# Patient Record
Sex: FEMALE | Race: WHITE | NOT HISPANIC OR LATINO | URBAN - METROPOLITAN AREA
[De-identification: names, ages, dates, MRNs, and addresses within clinical notes are randomized per-mention and may not be internally consistent; named-entity substitution may affect disease eponyms.]

---

## 2017-09-30 VITALS
OXYGEN SATURATION: 98 % | WEIGHT: 158.07 LBS | TEMPERATURE: 98 F | SYSTOLIC BLOOD PRESSURE: 103 MMHG | RESPIRATION RATE: 16 BRPM | HEART RATE: 71 BPM | DIASTOLIC BLOOD PRESSURE: 68 MMHG

## 2017-09-30 PROCEDURE — 99291 CRITICAL CARE FIRST HOUR: CPT | Mod: 25

## 2017-09-30 PROCEDURE — 93010 ELECTROCARDIOGRAM REPORT: CPT

## 2017-09-30 PROCEDURE — 99282 EMERGENCY DEPT VISIT SF MDM: CPT

## 2017-09-30 PROCEDURE — 70450 CT HEAD/BRAIN W/O DYE: CPT | Mod: 26

## 2017-09-30 RX ORDER — SODIUM CHLORIDE 9 MG/ML
1000 INJECTION INTRAMUSCULAR; INTRAVENOUS; SUBCUTANEOUS
Qty: 0 | Refills: 0 | Status: DISCONTINUED | OUTPATIENT
Start: 2017-09-30 | End: 2017-10-01

## 2017-09-30 RX ADMIN — SODIUM CHLORIDE 125 MILLILITER(S): 9 INJECTION INTRAMUSCULAR; INTRAVENOUS; SUBCUTANEOUS at 23:41

## 2017-09-30 NOTE — CONSULT NOTE ADULT - SUBJECTIVE AND OBJECTIVE BOX
Pt is 71yo female, on Xarelto for A-fib, s/p mechanical fall today, + head trauma, no LOC, HCT: possible L temporal horn very small IVH, pt at this time denies sob, cp, acute numbness/weakness, acute visual changes, n/v, neck pain.    ICU Vital Signs Last 24 Hrs  T(C): 36.6 (30 Sep 2017 20:32), Max: 36.6 (30 Sep 2017 20:32)  T(F): 97.9 (30 Sep 2017 20:32), Max: 97.9 (30 Sep 2017 20:32)  HR: 71 (30 Sep 2017 20:32) (71 - 71)  BP: 103/68 (30 Sep 2017 20:32) (103/68 - 103/68)  BP(mean): --  ABP: --  ABP(mean): --  RR: 16 (30 Sep 2017 20:32) (16 - 16)  SpO2: 98% (30 Sep 2017 20:32) (98% - 98%)      < from: CT Head No Cont (09.30.17 @ 21:19) >  Linear hyperdense focus within the left temporal horn, possibly   representing intraventricular hemorrhage versus choroid calcifications.     < end of copied text >      Exam:   AA&Ox3, NAD, clear coherent speech,  CNs II-XII grossly intact,  motor 5/5 x 4 extr, no drift, no dysmetria,  sensation to LT grossly intact in all dermatomes,  Head: occipital small abrasion, no drainage,    Recommendations:  No Neurosurgical intervention is indicated at this time  Hold Xarelto x 7 days  Keppra 500q12 x 7 days   Neurology consult and HCT repeat in AM tomorrow or earlier if any acute Neurological changes are noted  Call with any questions or concerns [phone]  D/w Dr. Elizabeth

## 2017-09-30 NOTE — ED ADULT TRIAGE NOTE - CHIEF COMPLAINT QUOTE
missed 1 step falling down stairs, hitting back of head on the floor; with cut at the back of head, + pain to head; denies LoC, dizziness or nausea; currently on Xarelto

## 2017-10-01 ENCOUNTER — INPATIENT (INPATIENT)
Facility: HOSPITAL | Age: 72
LOS: 0 days | Discharge: HOME | DRG: 605 | End: 2017-10-01
Attending: PSYCHIATRY & NEUROLOGY | Admitting: PSYCHIATRY & NEUROLOGY
Payer: COMMERCIAL

## 2017-10-01 ENCOUNTER — TRANSCRIPTION ENCOUNTER (OUTPATIENT)
Age: 72
End: 2017-10-01

## 2017-10-01 VITALS — TEMPERATURE: 98 F

## 2017-10-01 DIAGNOSIS — I48.91 UNSPECIFIED ATRIAL FIBRILLATION: ICD-10-CM

## 2017-10-01 DIAGNOSIS — I10 ESSENTIAL (PRIMARY) HYPERTENSION: ICD-10-CM

## 2017-10-01 DIAGNOSIS — Z29.9 ENCOUNTER FOR PROPHYLACTIC MEASURES, UNSPECIFIED: ICD-10-CM

## 2017-10-01 DIAGNOSIS — Z90.710 ACQUIRED ABSENCE OF BOTH CERVIX AND UTERUS: Chronic | ICD-10-CM

## 2017-10-01 DIAGNOSIS — R63.8 OTHER SYMPTOMS AND SIGNS CONCERNING FOOD AND FLUID INTAKE: ICD-10-CM

## 2017-10-01 DIAGNOSIS — Z98.890 OTHER SPECIFIED POSTPROCEDURAL STATES: Chronic | ICD-10-CM

## 2017-10-01 DIAGNOSIS — K22.70 BARRETT'S ESOPHAGUS WITHOUT DYSPLASIA: ICD-10-CM

## 2017-10-01 DIAGNOSIS — E78.5 HYPERLIPIDEMIA, UNSPECIFIED: ICD-10-CM

## 2017-10-01 DIAGNOSIS — N17.9 ACUTE KIDNEY FAILURE, UNSPECIFIED: ICD-10-CM

## 2017-10-01 DIAGNOSIS — I62.9 NONTRAUMATIC INTRACRANIAL HEMORRHAGE, UNSPECIFIED: ICD-10-CM

## 2017-10-01 DIAGNOSIS — Z90.49 ACQUIRED ABSENCE OF OTHER SPECIFIED PARTS OF DIGESTIVE TRACT: Chronic | ICD-10-CM

## 2017-10-01 DIAGNOSIS — K75.81 NONALCOHOLIC STEATOHEPATITIS (NASH): ICD-10-CM

## 2017-10-01 LAB
ALBUMIN SERPL ELPH-MCNC: 4.2 G/DL — SIGNIFICANT CHANGE UP (ref 3.3–5)
ALP SERPL-CCNC: 63 U/L — SIGNIFICANT CHANGE UP (ref 40–120)
ALT FLD-CCNC: 32 U/L — SIGNIFICANT CHANGE UP (ref 10–45)
ANION GAP SERPL CALC-SCNC: 12 MMOL/L — SIGNIFICANT CHANGE UP (ref 5–17)
ANION GAP SERPL CALC-SCNC: 18 MMOL/L — HIGH (ref 5–17)
APTT BLD: 26 SEC — LOW (ref 27.5–37.4)
AST SERPL-CCNC: 51 U/L — HIGH (ref 10–40)
BASOPHILS NFR BLD AUTO: 0.5 % — SIGNIFICANT CHANGE UP (ref 0–2)
BILIRUB SERPL-MCNC: 0.4 MG/DL — SIGNIFICANT CHANGE UP (ref 0.2–1.2)
BUN SERPL-MCNC: 40 MG/DL — HIGH (ref 7–23)
BUN SERPL-MCNC: 44 MG/DL — HIGH (ref 7–23)
CALCIUM SERPL-MCNC: 8.6 MG/DL — SIGNIFICANT CHANGE UP (ref 8.4–10.5)
CALCIUM SERPL-MCNC: 9.5 MG/DL — SIGNIFICANT CHANGE UP (ref 8.4–10.5)
CHLORIDE SERPL-SCNC: 97 MMOL/L — SIGNIFICANT CHANGE UP (ref 96–108)
CHLORIDE SERPL-SCNC: 99 MMOL/L — SIGNIFICANT CHANGE UP (ref 96–108)
CO2 SERPL-SCNC: 19 MMOL/L — LOW (ref 22–31)
CO2 SERPL-SCNC: 24 MMOL/L — SIGNIFICANT CHANGE UP (ref 22–31)
CREAT SERPL-MCNC: 1.46 MG/DL — HIGH (ref 0.5–1.3)
CREAT SERPL-MCNC: 1.55 MG/DL — HIGH (ref 0.5–1.3)
EOSINOPHIL NFR BLD AUTO: 0.7 % — SIGNIFICANT CHANGE UP (ref 0–6)
GLUCOSE SERPL-MCNC: 155 MG/DL — HIGH (ref 70–99)
GLUCOSE SERPL-MCNC: 178 MG/DL — HIGH (ref 70–99)
HCT VFR BLD CALC: 42.9 % — SIGNIFICANT CHANGE UP (ref 34.5–45)
HGB BLD-MCNC: 14.8 G/DL — SIGNIFICANT CHANGE UP (ref 11.5–15.5)
INR BLD: 1.26 — HIGH (ref 0.88–1.16)
LYMPHOCYTES # BLD AUTO: 23.2 % — SIGNIFICANT CHANGE UP (ref 13–44)
MCHC RBC-ENTMCNC: 32 PG — SIGNIFICANT CHANGE UP (ref 27–34)
MCHC RBC-ENTMCNC: 34.5 G/DL — SIGNIFICANT CHANGE UP (ref 32–36)
MCV RBC AUTO: 92.7 FL — SIGNIFICANT CHANGE UP (ref 80–100)
MONOCYTES NFR BLD AUTO: 6.5 % — SIGNIFICANT CHANGE UP (ref 2–14)
NEUTROPHILS NFR BLD AUTO: 69.1 % — SIGNIFICANT CHANGE UP (ref 43–77)
PLATELET # BLD AUTO: 193 K/UL — SIGNIFICANT CHANGE UP (ref 150–400)
POTASSIUM SERPL-MCNC: 4 MMOL/L — SIGNIFICANT CHANGE UP (ref 3.5–5.3)
POTASSIUM SERPL-MCNC: 4.4 MMOL/L — SIGNIFICANT CHANGE UP (ref 3.5–5.3)
POTASSIUM SERPL-MCNC: SIGNIFICANT CHANGE UP MMOL/L (ref 3.5–5.3)
POTASSIUM SERPL-SCNC: 4 MMOL/L — SIGNIFICANT CHANGE UP (ref 3.5–5.3)
POTASSIUM SERPL-SCNC: 4.4 MMOL/L — SIGNIFICANT CHANGE UP (ref 3.5–5.3)
POTASSIUM SERPL-SCNC: SIGNIFICANT CHANGE UP MMOL/L (ref 3.5–5.3)
PROT SERPL-MCNC: 8.1 G/DL — SIGNIFICANT CHANGE UP (ref 6–8.3)
PROTHROM AB SERPL-ACNC: 14.1 SEC — HIGH (ref 9.8–12.7)
RBC # BLD: 4.63 M/UL — SIGNIFICANT CHANGE UP (ref 3.8–5.2)
RBC # FLD: 15.8 % — SIGNIFICANT CHANGE UP (ref 10.3–16.9)
SODIUM SERPL-SCNC: 134 MMOL/L — LOW (ref 135–145)
SODIUM SERPL-SCNC: 135 MMOL/L — SIGNIFICANT CHANGE UP (ref 135–145)
WBC # BLD: 14.8 K/UL — HIGH (ref 3.8–10.5)
WBC # FLD AUTO: 14.8 K/UL — HIGH (ref 3.8–10.5)

## 2017-10-01 PROCEDURE — 80053 COMPREHEN METABOLIC PANEL: CPT

## 2017-10-01 PROCEDURE — 85025 COMPLETE CBC W/AUTO DIFF WBC: CPT

## 2017-10-01 PROCEDURE — 80048 BASIC METABOLIC PNL TOTAL CA: CPT

## 2017-10-01 PROCEDURE — 85730 THROMBOPLASTIN TIME PARTIAL: CPT

## 2017-10-01 PROCEDURE — 99285 EMERGENCY DEPT VISIT HI MDM: CPT | Mod: 25

## 2017-10-01 PROCEDURE — 70450 CT HEAD/BRAIN W/O DYE: CPT | Mod: 26

## 2017-10-01 PROCEDURE — 70450 CT HEAD/BRAIN W/O DYE: CPT

## 2017-10-01 PROCEDURE — 99222 1ST HOSP IP/OBS MODERATE 55: CPT

## 2017-10-01 PROCEDURE — 36415 COLL VENOUS BLD VENIPUNCTURE: CPT

## 2017-10-01 PROCEDURE — 85610 PROTHROMBIN TIME: CPT

## 2017-10-01 PROCEDURE — 84132 ASSAY OF SERUM POTASSIUM: CPT

## 2017-10-01 PROCEDURE — 93005 ELECTROCARDIOGRAM TRACING: CPT

## 2017-10-01 RX ORDER — ACETAMINOPHEN 500 MG
650 TABLET ORAL EVERY 6 HOURS
Qty: 0 | Refills: 0 | Status: DISCONTINUED | OUTPATIENT
Start: 2017-10-01 | End: 2017-10-01

## 2017-10-01 RX ORDER — RIVAROXABAN 15 MG-20MG
15 KIT ORAL AT BEDTIME
Qty: 0 | Refills: 0 | Status: DISCONTINUED | OUTPATIENT
Start: 2017-10-01 | End: 2017-10-01

## 2017-10-01 RX ORDER — CHOLECALCIFEROL (VITAMIN D3) 125 MCG
2000 CAPSULE ORAL DAILY
Qty: 0 | Refills: 0 | Status: DISCONTINUED | OUTPATIENT
Start: 2017-10-01 | End: 2017-10-01

## 2017-10-01 RX ORDER — ASPIRIN/CALCIUM CARB/MAGNESIUM 324 MG
81 TABLET ORAL DAILY
Qty: 0 | Refills: 0 | Status: DISCONTINUED | OUTPATIENT
Start: 2017-10-01 | End: 2017-10-01

## 2017-10-01 RX ORDER — ATORVASTATIN CALCIUM 80 MG/1
40 TABLET, FILM COATED ORAL AT BEDTIME
Qty: 0 | Refills: 0 | Status: DISCONTINUED | OUTPATIENT
Start: 2017-10-01 | End: 2017-10-01

## 2017-10-01 RX ORDER — PREGABALIN 225 MG/1
1000 CAPSULE ORAL DAILY
Qty: 0 | Refills: 0 | Status: DISCONTINUED | OUTPATIENT
Start: 2017-10-01 | End: 2017-10-01

## 2017-10-01 RX ORDER — INFLUENZA VIRUS VACCINE 15; 15; 15; 15 UG/.5ML; UG/.5ML; UG/.5ML; UG/.5ML
0.5 SUSPENSION INTRAMUSCULAR ONCE
Qty: 0 | Refills: 0 | Status: COMPLETED | OUTPATIENT
Start: 2017-10-01 | End: 2017-10-01

## 2017-10-01 RX ORDER — ACETAMINOPHEN 500 MG
650 TABLET ORAL ONCE
Qty: 0 | Refills: 0 | Status: COMPLETED | OUTPATIENT
Start: 2017-10-01 | End: 2017-10-01

## 2017-10-01 RX ORDER — PANTOPRAZOLE SODIUM 20 MG/1
40 TABLET, DELAYED RELEASE ORAL
Qty: 0 | Refills: 0 | Status: DISCONTINUED | OUTPATIENT
Start: 2017-10-01 | End: 2017-10-01

## 2017-10-01 RX ADMIN — Medication 650 MILLIGRAM(S): at 01:50

## 2017-10-01 RX ADMIN — PANTOPRAZOLE SODIUM 40 MILLIGRAM(S): 20 TABLET, DELAYED RELEASE ORAL at 06:53

## 2017-10-01 RX ADMIN — Medication 650 MILLIGRAM(S): at 13:08

## 2017-10-01 RX ADMIN — Medication 650 MILLIGRAM(S): at 02:38

## 2017-10-01 NOTE — H&P ADULT - ATTENDING COMMENTS
possible hemorrhage vs. choroid calcification in left temporal horn after a fall while at a wedding. She had had a drink but states she was not drunk and just didn't see the stairs. She hit the back of her head and fell onto her lower back. Repeat CT head more consistent with choroid calcification. Neurological exam shows some sensory loss in the legs worse on the left, with some hyperreflexia in the left arm; she could have cervical spine disease +/- lumbar spine disease vs. polyneuropathy. Creatinine is elevated but could be chronic. All of these can be worked up further as an outpatient. Discharge home with follow up with PMD and with me for further workup and treatment possible hemorrhage vs. choroid calcification in left temporal horn after a fall while at a wedding. She had had a drink but states she was not drunk and just didn't see the stairs. She hit the back of her head and fell onto her lower back. Repeat CT head more consistent with choroid calcification. Neurological exam shows some sensory loss in the legs worse on the left, with some hyperreflexia in the left arm; she could have cervical spine disease +/- lumbar spine disease vs. polyneuropathy. Creatinine is elevated but could be chronic. All of these can be worked up further as an outpatient. Discharge home with follow up with PMD and with me for further workup and treatment. Can restart xarelto and no need for antiepileptic as there is no hemorrhage.

## 2017-10-01 NOTE — DISCHARGE NOTE ADULT - CARE PLAN
Principal Discharge DX:	Intracranial bleed  Goal:	r/o intracranial bleed  Instructions for follow-up, activity and diet:	You had a fall down a few steps, without loss of consciousness. You had a head CT which showed possible bleed, however repeat imaging was negative for any bleed.   Please continue your Xarelto as prescribed.    Return to ED or call your PMD if you experience any blurry vision, intractable headaches, dizziness, or weakness of your extremities.  Secondary Diagnosis:	Fall down steps  Instructions for follow-up, activity and diet:	Plan as above.  Secondary Diagnosis:	Peripheral neuropathy  Instructions for follow-up, activity and diet:	Please followup with Dr. Arreguin in 1-2 weeks.  Secondary Diagnosis:	Atrial fibrillation  Instructions for follow-up, activity and diet:	Continue home Xarelto.  Secondary Diagnosis:	TED (acute kidney injury)  Instructions for follow-up, activity and diet:	Please followup with your PMD in 1 week for repeat labs to check your kidney function.

## 2017-10-01 NOTE — PROGRESS NOTE ADULT - SUBJECTIVE AND OBJECTIVE BOX
NEUROSURGERY CONSULT NOTE:    Repeat CTH performed this am indicates no acute intracranial hemorrhage. In light of the CTH finding, Xaralto can be restarted and Keppra can be discontinued at this time.      PROCEDURE: CT head without intravenous contrast    CLINICAL INDICATION: Mechanical fall. Possible small intracranial   hemorrhage on prior CT scan.    COMPARISON: CT brain 9/30/2017.    FINDINGS:     The ventricles, cisternal spaces, and cortical sulci are normal in size   and configuration.    There is no acute intracranial hemorrhage. There is no mass effect,   midline shift or extra axial collection.     The gray white differentiation appears preserved without evidence of an   acute transcortical infarction.     The bony windows demonstrates no fractures. The visualized paranasal   sinuses and mastoid air cells are predominantly clear.    IMPRESSION:     No evidence for acute intracranial hemorrhage.            HPI:  71 yo F PMH of Afib (recently diagnosed and on Xarelto), HARRIS, neuropathy HLD, HTN presenting after tripping on stairs, hitting the posterior left of her occiput on the floor.  She was in her usual state of health prior to the event.  She denies any loss of consciousness, chest pain, palpitations, SOB, tunnel vision or light-headedness prior to or after the event.    In Ed: Vitals: T: 97.9, HR: 71, BP: 103/68, RR: 16, 98% on RA.  Labs significant for TED with creatinine of 1.46, BUN of 40, and mild elevation of AST to 51.  Head CT showed Linear hyperdense focus within the left temporal horn, possibly representing intraventricular hemorrhage versus choroid calcifications. Short-term follow-up head CT is recommended."  Patient evaluated by neurosurgery and deemed stable for medical management at this time. Patient received 1L NS and was admitted to  for further monitoring. (01 Oct 2017 02:23)    OVERNIGHT EVENTS:  Vital Signs Last 24 Hrs  T(C): 36.7 (01 Oct 2017 12:37), Max: 36.7 (01 Oct 2017 02:56)  T(F): 98 (01 Oct 2017 12:37), Max: 98.1 (01 Oct 2017 02:56)  HR: 75 (01 Oct 2017 12:37) (71 - 81)  BP: 131/58 (01 Oct 2017 12:37) (103/68 - 143/51)  BP(mean): 74 (01 Oct 2017 06:50) (74 - 90)  RR: 17 (01 Oct 2017 12:37) (16 - 18)  SpO2: 100% (01 Oct 2017 12:37) (97% - 100%)    I&O's Summary      PHYSICAL EXAM:  Neurological:  AAOx3, NAD, coherent speech, FC  CNII-XII grossly intact, PERRL, EOMI, face symmetric  MAEx4, strength 5/5 UE and LE b/l, no drift  SILT throughout b/l        TUBES/LINES:  [] Friedman  [] Lumbar Drain  [] Wound Drains  [] Others      DIET:  [] NPO  [] Mechanical  [] Tube feeds    LABS:                        14.8   14.8  )-----------( 193      ( 30 Sep 2017 23:31 )             42.9     10-01    135  |  99  |  44<H>  ----------------------------<  178<H>  4.4   |  24  |  1.55<H>    Ca    8.6      01 Oct 2017 07:28    TPro  8.1  /  Alb  4.2  /  TBili  0.4  /  DBili  x   /  AST  51<H>  /  ALT  32  /  AlkPhos  63  09-30    PT/INR - ( 30 Sep 2017 23:31 )   PT: 14.1 sec;   INR: 1.26          PTT - ( 30 Sep 2017 23:31 )  PTT:26.0 sec        CAPILLARY BLOOD GLUCOSE          Drug Levels: [] N/A    CSF Analysis: [] N/A      Allergies    No Known Allergies    Intolerances      MEDICATIONS:  Antibiotics:    Neuro:  acetaminophen   Tablet. 650 milliGRAM(s) Oral every 6 hours PRN    Anticoagulation:  rivaroxaban 15 milliGRAM(s) Oral at bedtime    OTHER:  atorvastatin 40 milliGRAM(s) Oral at bedtime  pantoprazole    Tablet 40 milliGRAM(s) Oral before breakfast    IVF:  cyanocobalamin 1000 MICROGram(s) Oral daily  cholecalciferol 2000 Unit(s) Oral daily    CULTURES:    RADIOLOGY & ADDITIONAL TESTS:      ASSESSMENT:  72y Female s/p    INTRACRANIAL BLEED  No h/o HF  Unknown h/o HF  No pertinent family history in first degree relatives  Handoff  MEWS Score  Uterine cancer  Kerr's esophagus  Neuropathy  HARRIS (nonalcoholic steatohepatitis)  HLD (hyperlipidemia)  HTN (hypertension)  Peripheral neuropathy  Atrial fibrillation  Intracranial bleed  Intracranial bleed  Need for prophylactic measure  Nutrition, metabolism, and development symptoms  Kerr's esophagus  HARRIS (nonalcoholic steatohepatitis)  HLD (hyperlipidemia)  HTN (hypertension)  Atrial fibrillation  TED (acute kidney injury)  Intracranial bleed  H/O shoulder surgery  H/O total hysterectomy  History of cholecystectomy  FALL  TED (acute kidney injury)  Atrial fibrillation  Peripheral neuropathy  Fall down steps      PLAN:  -repeat CTH this am- stable   -OK to resume Xarelto  -OK to discontinue Keppra   -No neurosurgical intervention at this time  -Currently signing off on this patient, re-consult neurosurgery with any change in mental status or acute neuro changes  -d/w Dr. Elizabeth

## 2017-10-01 NOTE — DISCHARGE NOTE ADULT - HOSPITAL COURSE
73 yo F PMH of Afib (recently diagnosed and on Xarelto), HARRIS, neuropathy HLD, HTN presenting after tripping on stairs, hitting the posterior left of her occiput on the floor.  She was in her usual state of health prior to the event.  She denies any loss of consciousness, chest pain, palpitations, SOB, tunnel vision or light-headedness prior to or after the event.  Head CT showed Linear hyperdense focus within the left temporal horn, possibly representing intraventricular hemorrhage versus choroid calcifications. Patient evaluated by neurosurgery on admission, no intervention indicated. Repeat Head CT w/out acute pathology.   Patient to followup with Dr. Arreguin for peripheral neuropathy. 73 yo F PMH of Afib (recently diagnosed and on Xarelto), HARRIS, neuropathy HLD, HTN presenting after tripping on stairs, hitting the posterior left of her occiput on the floor.  She was in her usual state of health prior to the event.  She denies any loss of consciousness, chest pain, palpitations, SOB, tunnel vision or light-headedness prior to or after the event.  Head CT showed Linear hyperdense focus within the left temporal horn, possibly representing intraventricular hemorrhage versus choroid calcifications. Patient evaluated by neurosurgery on admission, no intervention indicated. Repeat Head CT w/out acute pathology.   Patient with unsteady gait possibly secondary L foot neuropathy, will followup with Dr. Arreguin outpatient.

## 2017-10-01 NOTE — H&P ADULT - NSHPLABSRESULTS_GEN_ALL_CORE
.  LABS:                         14.8   14.8  )-----------( 193      ( 30 Sep 2017 23:31 )             42.9     10-01    x   |  x   |  x   ----------------------------<  x   4.0   |  x   |  x     Ca    9.5      30 Sep 2017 23:31    TPro  8.1  /  Alb  4.2  /  TBili  0.4  /  DBili  x   /  AST  51<H>  /  ALT  32  /  AlkPhos  63  09-30    PT/INR - ( 30 Sep 2017 23:31 )   PT: 14.1 sec;   INR: 1.26          PTT - ( 30 Sep 2017 23:31 )  PTT:26.0 sec              RADIOLOGY, EKG & ADDITIONAL TESTS: Reviewed. .  LABS:                         14.8   14.8  )-----------( 193      ( 30 Sep 2017 23:31 )             42.9     10-01    x   |  x   |  x   ----------------------------<  x   4.0   |  x   |  x     Ca    9.5      30 Sep 2017 23:31    TPro  8.1  /  Alb  4.2  /  TBili  0.4  /  DBili  x   /  AST  51<H>  /  ALT  32  /  AlkPhos  63  09-30    PT/INR - ( 30 Sep 2017 23:31 )   PT: 14.1 sec;   INR: 1.26          PTT - ( 30 Sep 2017 23:31 )  PTT:26.0 sec              RADIOLOGY, EKG & ADDITIONAL TESTS:    < from: CT Head No Cont (09.30.17 @ 21:19) >  Linear hyperdense focus within the left temporal horn, possibly   representing intraventricular hemorrhage versus choroid calcifications.   Short-term follow-up head CT is recommended.

## 2017-10-01 NOTE — ED PROVIDER NOTE - NEURO NEGATIVE STATEMENT, MLM
no loss of consciousness, no gait abnormality, + mild headache, no sensory deficits, and no weakness.

## 2017-10-01 NOTE — ED PROVIDER NOTE - CRITICAL CARE PROVIDED
additional history taking/interpretation of diagnostic studies/consultation with other physicians/consult w/ pt's family directly relating to pts condition/direct patient care (not related to procedure)/documentation

## 2017-10-01 NOTE — DISCHARGE NOTE ADULT - PATIENT PORTAL LINK FT
“You can access the [facility] Patient Portal, offered by [facility], by registering with the following website: [email or web address]”

## 2017-10-01 NOTE — H&P ADULT - PROBLEM SELECTOR PLAN 3
Patient with recent diagnosis of atrial fibrillation on Xarelto at home.  ChadsVasc score of 3.  - Holding Xarelto in the setting of possible active ICH bleed

## 2017-10-01 NOTE — DISCHARGE NOTE ADULT - MEDICATION SUMMARY - MEDICATIONS TO TAKE
I will START or STAY ON the medications listed below when I get home from the hospital:    Ecotrin Adult Low Strength 81 mg oral delayed release tablet  -- 1 tab(s) by mouth once a day  -- Indication: For Prophylaxsis     Xarelto 20 mg oral tablet  -- 1 tab(s) by mouth once a day (in the evening)  -- Indication: For Afib    atorvastatin 40 mg oral tablet  -- 1 tab(s) by mouth once a day  -- Indication: For HLD (hyperlipidemia)    zolpidem 10 mg oral tablet  -- 1 tab(s) by mouth once a day (at bedtime)  -- Indication: For INsomnia    Bystolic 20 mg oral tablet  -- 1 tab(s) by mouth once a day  -- Indication: For Hypertension    raNITIdine 150 mg oral capsule  -- 1 cap(s) by mouth 2 times a day  -- Indication: For GERD    Milk Thistle oral tablet  -- 1000 milligram(s) by mouth once a day  -- Indication: For Prophylaxsis    NexIUM 40 mg oral delayed release capsule  -- 1 cap(s) by mouth once a day  -- Indication: For GERD    Vitamin B-12 1000 mcg oral tablet  -- 1 tab(s) by mouth once a day  -- Indication: For Prophylaxsis     Vitamin D3 2000 intl units oral tablet  -- 1 tab(s) by mouth once a day  -- Indication: For Prophylaxsis    vitamin E oral capsule  -- 1 cap(s) by mouth once a day  -- Indication: For Prophylaxsis

## 2017-10-01 NOTE — H&P ADULT - NSHPREVIEWOFSYSTEMS_GEN_ALL_CORE
CONSTITUTIONAL: no fever and no chills. no weakness  CARDIOVASCULAR: no palpitations, no chest pain and no edema.  RESPIRATORY: no chest pain, no SOB  NEURO: no loss of consciousness, no gait abnormality, no headache, no sensory deficits. CONSTITUTIONAL: no fever and no chills. no weakness  CARDIOVASCULAR: no palpitations, no chest pain and no edema.  GASTROINTESTINAL: mild abdominal gas pain, no nausea, no vomiting, no diarrhea  RESPIRATORY: no chest pain, no SOB  NEURO: no loss of consciousness, no gait abnormality, no headache, no sensory deficits.

## 2017-10-01 NOTE — H&P ADULT - HISTORY OF PRESENT ILLNESS
73 yo F PMH of Afib (recently diagnosed and on Xarelto), HARRIS, neuropathy HLD, HTN presenting after tripping on stairs, hitting the posterior left of her occiput on the floor.  She was in her usual state of health prior to the event.  She denies any loss of consciousness, chest pain, palpitations, SOB, tunnel vision or light-headedness prior to or after the event.    In Ed: Vitals: T: 97.9, HR: 71, BP: 103/68, RR: 16, 98% on RA.  Labs significant for TED with creatinine of 1.46, BUN of 40, and mild elevation of AST to 51.  Head CT showed Linear hyperdense focus within the left temporal horn, possibly representing intraventricular hemorrhage versus choroid calcifications. Short-term follow-up head CT is recommended."  Patient evaluated by neurosurgery and deemed stable for medical management at this time. Patient received 1L NS and was admitted to 7L for further monitoring.

## 2017-10-01 NOTE — H&P ADULT - PROBLEM SELECTOR PLAN 2
Patient with creatinine of 1.46 with no known hx of CKD with BUN:Creatinine ratio >20:1 suggesting pre-renal etiology and patient with dry mucus membranes on exam.  S/p 1L NS in ED.    - F/u AM BMP  - Encourage PO intake

## 2017-10-01 NOTE — ED PROVIDER NOTE - MEDICAL DECISION MAKING DETAILS
Patient with small intracranial bleed , neurosurgeon was consulted and decline case. As per neurosurgery pat able to go to neurology. Case discussed with Dr. Arreguin agree to accept admission. Labs with mild elevated wbc, no fever , no shift and no symptoms. Admitted for rpt ct scan and observation. Patient appears well ,NAD and VSS. Laceration repaired. TD was declined.

## 2017-10-01 NOTE — H&P ADULT - ASSESSMENT
Assessment and Plan:     Neurology:      Cardiology    :    FEN:  Replete lytes PRN K>4, Mg>2    PPx:  SCDs    Code: FULL CODE    Dispo: Patient requires continued monitoring in [place] 73 yo F PMH of Afib (recently diagnosed and on Xarelto), HARRIS, neuropathy HLD, HTN presenting after mechanical fall admitted for possible small ICH with follow up repeat CT head pending.

## 2017-10-01 NOTE — DISCHARGE NOTE ADULT - PLAN OF CARE
Please followup with Dr. Arreguin in 1-2 weeks. Continue home Xarelto. Please followup with your PMD in 1 week for repeat labs to check your kidney function. r/o intracranial bleed You had a fall down a few steps, without loss of consciousness. You had a head CT which showed possible bleed, however repeat imaging was negative for any bleed.   Please continue your Xarelto as prescribed.    Return to ED or call your PMD if you experience any blurry vision, intractable headaches, dizziness, or weakness of your extremities. Plan as above.

## 2017-10-01 NOTE — H&P ADULT - NSHPPHYSICALEXAM_GEN_ALL_CORE
.  VITAL SIGNS:  T(C): 36.5 (10-01-17 @ 02:21), Max: 36.6 (09-30-17 @ 20:32)  T(F): 97.7 (10-01-17 @ 02:21), Max: 97.9 (09-30-17 @ 20:32)  HR: 77 (10-01-17 @ 02:21) (71 - 81)  BP: 108/69 (10-01-17 @ 02:21) (103/68 - 117/63)  BP(mean): --  RR: 16 (10-01-17 @ 02:21) (16 - 16)  SpO2: 97% (10-01-17 @ 02:21) (97% - 98%)  Wt(kg): --    PHYSICAL EXAM:    Constitutional: WDWN resting comfortably in bed; NAD  Head: NC/AT  Eyes: PERRL, EOMI, anicteric sclera  ENT: no nasal discharge; uvula midline, no oropharyngeal erythema or exudates; MMM  Neck: supple; no JVD or thyromegaly  Respiratory: CTA B/L; no W/R/R, no retractions  Cardiac: +S1/S2; RRR; no M/R/G; PMI non-displaced  Gastrointestinal: soft, NT/ND; no rebound or guarding; +BSx4  Genitourinary: normal external genitalia  Back: spine midline, no bony tenderness or step-offs; no CVAT B/L  Extremities: WWP, no clubbing or cyanosis; no peripheral edema  Musculoskeletal: NROM x4; no joint swelling, tenderness or erythema  Vascular: 2+ radial, femoral, DP/PT pulses B/L  Dermatologic: skin warm, dry and intact; no rashes, wounds, or scars  Lymphatic: no submandibular or cervical LAD  Neurologic: AAOx3; CNII-XII grossly intact; no focal deficits  Psychiatric: affect and characteristics of appearance, verbalizations, behaviors are appropriate .  VITAL SIGNS:  T(C): 36.5 (10-01-17 @ 02:21), Max: 36.6 (09-30-17 @ 20:32)  T(F): 97.7 (10-01-17 @ 02:21), Max: 97.9 (09-30-17 @ 20:32)  HR: 77 (10-01-17 @ 02:21) (71 - 81)  BP: 108/69 (10-01-17 @ 02:21) (103/68 - 117/63)  BP(mean): --  RR: 16 (10-01-17 @ 02:21) (16 - 16)  SpO2: 97% (10-01-17 @ 02:21) (97% - 98%)  Wt(kg): --    PHYSICAL EXAM:    Constitutional: WDWN resting comfortably in bed; NAD  Head: Patient with laceration, now closed with staples on left occiput.  Clean, non-bleeding.  Eyes: PERRL, EOMI, anicteric sclera  ENT: no nasal discharge; uvula midline, no oropharyngeal erythema or exudates; Dry mucus membranes  Respiratory: CTA B/L; no W/R/R, no retractions  Cardiac: +S1/S2; RRR; no M/R/G  Gastrointestinal: soft, NT/ND; no rebound or guarding; +BSx4  Back: spine midline, no bony tenderness or step-offs; no CVAT B/L  Extremities: WWP, no clubbing or cyanosis; no peripheral edema  Musculoskeletal: NROM x4; no joint swelling, tenderness or erythema  Vascular: 2+ radial, 2+DP pulses B/L  Dermatologic: skin warm, dry and intact; no rashes, multiple seborrheic keratoses.  Echymotic area on left lower extremity approximately 3x4 inches  Neurologic: AAOx3; CNII-XII grossly intact; no focal deficits  Psychiatric: affect and characteristics of appearance, verbalizations, behaviors are appropriate

## 2017-10-01 NOTE — DISCHARGE NOTE ADULT - CARE PROVIDER_API CALL
Corby Arreguin), Neurology  [address], [place] [facility]  Phone: [phone]  Fax: [phone]

## 2017-10-01 NOTE — H&P ADULT - NSHPSOCIALHISTORY_GEN_ALL_CORE
Smoke:  Etoh:  Drugs: Smoke: Patient has a 29 pack year smoking history and quit in 2005 when she was diagnosed with uterine cancer  Etoh:  Drugs: Patient has a 29 pack year smoking history and quit in 2005 when she was diagnosed with uterine cancer. Patient has a 29 pack year smoking history and quit in 2005 when she was diagnosed with uterine cancer.  She endorses having "a few" mixed vodka drinks per night.  She reports using marijuana on occasion, but denies other drug use.

## 2017-10-01 NOTE — ED PROVIDER NOTE - CHPI ED SYMPTOMS NEG
no confusion/no loss of consciousness/no nausea/no numbness/no vomiting/no dizziness/no fever/no change in level of consciousness/no weakness/no blurred vision

## 2017-10-01 NOTE — H&P ADULT - PMH
Atrial fibrillation    Peripheral neuropathy Atrial fibrillation    Kerr's esophagus    HLD (hyperlipidemia)    HTN (hypertension)    HARRIS (nonalcoholic steatohepatitis)    Neuropathy    Peripheral neuropathy    Uterine cancer

## 2017-10-01 NOTE — ED PROVIDER NOTE - OBJECTIVE STATEMENT
71 y/o f with h/o A-Fib on Xarelto , HTN presents to ED c/o head injury  a hour ago s/p mechanical fall. Patient was at a wedding today has a few drinks , missed a step and fell. She fell backward hitting her head. She has a laceration to back of her head. Denies loc, neck pain  , n, v, sob, chest pain, spinal tenderness. Ambulating well.

## 2017-10-01 NOTE — H&P ADULT - PROBLEM SELECTOR PLAN 1
Patient presenting after mechanical fall resulting in head trauma without LOC CT head showing possible ICH vs/ calcification in left temporal horn with normal neurological exam.  - F/u neurosurgery rec's; recommending levetiracetam outpatient.  - Hold Xeralto for 7 days  - Holding aspirin  - No pharmacological DVT prophylaxis  - F/u head CT in AM    #Leukocytosis; likely reactive in the setting of recent trauma and no clinical signs of infection. Patient presenting after mechanical fall resulting in head trauma without LOC CT head showing possible ICH vs/ calcification in left temporal horn with normal neurological exam.  - F/u neurosurgery rec's; recommending levetiracetam outpatient.  - Hold Xeralto for 7 days  - Holding aspirin  - No pharmacological DVT prophylaxis  - q4h neuro checks  - F/u head CT in AM    #Leukocytosis; likely reactive in the setting of recent trauma and no clinical signs of infection.

## 2017-10-01 NOTE — ED PROVIDER NOTE - ATTENDING CONTRIBUTION TO CARE
pt seen and examined by me, agree with above.  71 yo female slip and fall on stairs, fell back and hit back of head.  on exam small lac to post head 2 cm, awake and alert, neck nt, normal rom arms and legs, heart and lungs normal.  pt takes xarelta for afib.  ct head showed possible small intracranial bleed left intravent area.  pt will be admitted for neuro checks and to repeat ct head tomorrow.  ranjana neurology

## 2017-10-02 PROBLEM — G62.9 POLYNEUROPATHY, UNSPECIFIED: Chronic | Status: ACTIVE | Noted: 2017-09-30

## 2017-10-02 PROBLEM — I48.91 UNSPECIFIED ATRIAL FIBRILLATION: Chronic | Status: ACTIVE | Noted: 2017-09-30

## 2017-10-05 ENCOUNTER — APPOINTMENT (OUTPATIENT)
Dept: NEUROLOGY | Facility: CLINIC | Age: 72
End: 2017-10-05
Payer: MEDICARE

## 2017-10-05 VITALS
HEART RATE: 92 BPM | BODY MASS INDEX: 36.07 KG/M2 | DIASTOLIC BLOOD PRESSURE: 78 MMHG | HEIGHT: 62 IN | WEIGHT: 196 LBS | SYSTOLIC BLOOD PRESSURE: 120 MMHG | OXYGEN SATURATION: 99 % | TEMPERATURE: 99.3 F

## 2017-10-05 DIAGNOSIS — I48.91 UNSPECIFIED ATRIAL FIBRILLATION: ICD-10-CM

## 2017-10-05 DIAGNOSIS — F12.10 CANNABIS ABUSE, UNCOMPLICATED: ICD-10-CM
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
## 2017-10-05 DIAGNOSIS — Z87.19 PERSONAL HISTORY OF OTHER DISEASES OF THE DIGESTIVE SYSTEM: ICD-10-CM

## 2017-10-05 DIAGNOSIS — G93.89 OTHER SPECIFIED DISORDERS OF BRAIN: ICD-10-CM

## 2017-10-05 DIAGNOSIS — S01.81XA LACERATION WITHOUT FOREIGN BODY OF OTHER PART OF HEAD, INITIAL ENCOUNTER: ICD-10-CM

## 2017-10-05 DIAGNOSIS — E78.5 HYPERLIPIDEMIA, UNSPECIFIED: ICD-10-CM

## 2017-10-05 DIAGNOSIS — K22.70 BARRETT'S ESOPHAGUS WITHOUT DYSPLASIA: ICD-10-CM

## 2017-10-05 DIAGNOSIS — G62.9 POLYNEUROPATHY, UNSPECIFIED: ICD-10-CM

## 2017-10-05 DIAGNOSIS — Z87.891 PERSONAL HISTORY OF NICOTINE DEPENDENCE: ICD-10-CM

## 2017-10-05 DIAGNOSIS — K21.9 GASTRO-ESOPHAGEAL REFLUX DISEASE WITHOUT ESOPHAGITIS: ICD-10-CM

## 2017-10-05 DIAGNOSIS — Z79.01 LONG TERM (CURRENT) USE OF ANTICOAGULANTS: ICD-10-CM

## 2017-10-05 DIAGNOSIS — I10 ESSENTIAL (PRIMARY) HYPERTENSION: ICD-10-CM

## 2017-10-05 DIAGNOSIS — R26.81 UNSTEADINESS ON FEET: ICD-10-CM

## 2017-10-05 DIAGNOSIS — K75.81 NONALCOHOLIC STEATOHEPATITIS (NASH): ICD-10-CM

## 2017-10-05 DIAGNOSIS — Z85.42 PERSONAL HISTORY OF MALIGNANT NEOPLASM OF OTHER PARTS OF UTERUS: ICD-10-CM

## 2017-10-05 DIAGNOSIS — Z86.69 PERSONAL HISTORY OF OTHER DISEASES OF THE NERVOUS SYSTEM AND SENSE ORGANS: ICD-10-CM

## 2017-10-05 DIAGNOSIS — Z86.39 PERSONAL HISTORY OF OTHER ENDOCRINE, NUTRITIONAL AND METABOLIC DISEASE: ICD-10-CM

## 2017-10-05 DIAGNOSIS — Z78.9 OTHER SPECIFIED HEALTH STATUS: ICD-10-CM

## 2017-10-05 DIAGNOSIS — Z90.710 ACQUIRED ABSENCE OF BOTH CERVIX AND UTERUS: ICD-10-CM

## 2017-10-05 DIAGNOSIS — Z86.79 PERSONAL HISTORY OF OTHER DISEASES OF THE CIRCULATORY SYSTEM: ICD-10-CM
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
## 2017-10-05 PROCEDURE — 99215 OFFICE O/P EST HI 40 MIN: CPT | Mod: 25

## 2017-10-05 PROCEDURE — 95885 MUSC TST DONE W/NERV TST LIM: CPT | Mod: 59

## 2017-10-05 PROCEDURE — 95911 NRV CNDJ TEST 9-10 STUDIES: CPT

## 2017-10-10 PROBLEM — Z90.710 HISTORY OF HYSTERECTOMY: Status: RESOLVED | Noted: 2017-10-05 | Resolved: 2017-10-10

## 2017-10-10 PROBLEM — Z86.79 HISTORY OF HYPERTENSION: Status: RESOLVED | Noted: 2017-10-05 | Resolved: 2017-10-10

## 2017-10-10 PROBLEM — Z87.19 HISTORY OF HEMORRHOIDS: Status: RESOLVED | Noted: 2017-10-05 | Resolved: 2017-10-10

## 2017-10-10 PROBLEM — Z86.39 HISTORY OF HIGH CHOLESTEROL: Status: RESOLVED | Noted: 2017-10-05 | Resolved: 2017-10-10

## 2017-10-10 PROBLEM — Z78.9 DOES NOT USE ILLICIT DRUGS: Status: ACTIVE | Noted: 2017-10-05

## 2017-10-10 PROBLEM — Z86.69 HISTORY OF CATARACT: Status: RESOLVED | Noted: 2017-10-05 | Resolved: 2017-10-10

## 2017-10-10 PROBLEM — Z87.19 HISTORY OF BARRETT'S ESOPHAGUS: Status: RESOLVED | Noted: 2017-10-05 | Resolved: 2017-10-10

## 2017-10-23 ENCOUNTER — OUTPATIENT (OUTPATIENT)
Dept: OUTPATIENT SERVICES | Facility: HOSPITAL | Age: 72
LOS: 1 days | End: 2017-10-23
Payer: MEDICARE

## 2017-10-23 DIAGNOSIS — Z98.890 OTHER SPECIFIED POSTPROCEDURAL STATES: Chronic | ICD-10-CM

## 2017-10-23 DIAGNOSIS — Z90.49 ACQUIRED ABSENCE OF OTHER SPECIFIED PARTS OF DIGESTIVE TRACT: Chronic | ICD-10-CM

## 2017-10-23 DIAGNOSIS — Z90.710 ACQUIRED ABSENCE OF BOTH CERVIX AND UTERUS: Chronic | ICD-10-CM

## 2017-10-23 PROCEDURE — 72141 MRI NECK SPINE W/O DYE: CPT | Mod: 26

## 2017-10-23 PROCEDURE — 72141 MRI NECK SPINE W/O DYE: CPT

## 2017-10-23 PROCEDURE — A9585: CPT

## 2017-10-30 ENCOUNTER — APPOINTMENT (OUTPATIENT)
Dept: NEUROLOGY | Facility: CLINIC | Age: 72
End: 2017-10-30
Payer: MEDICARE

## 2017-10-30 VITALS
OXYGEN SATURATION: 99 % | HEART RATE: 79 BPM | BODY MASS INDEX: 35.41 KG/M2 | SYSTOLIC BLOOD PRESSURE: 157 MMHG | DIASTOLIC BLOOD PRESSURE: 81 MMHG | HEIGHT: 61.5 IN | WEIGHT: 190 LBS | TEMPERATURE: 98 F

## 2017-10-30 VITALS — SYSTOLIC BLOOD PRESSURE: 157 MMHG | DIASTOLIC BLOOD PRESSURE: 91 MMHG

## 2017-10-30 DIAGNOSIS — G54.9 NERVE ROOT AND PLEXUS DISORDER, UNSPECIFIED: ICD-10-CM

## 2017-10-30 DIAGNOSIS — R26.9 UNSPECIFIED ABNORMALITIES OF GAIT AND MOBILITY: ICD-10-CM

## 2017-10-30 PROCEDURE — 99214 OFFICE O/P EST MOD 30 MIN: CPT

## 2017-10-31 LAB
24R-OH-CALCIDIOL SERPL-MCNC: 24.9 PG/ML
25(OH)D3 SERPL-MCNC: 32.3 NG/ML
ALBUMIN MFR SERPL ELPH: 58.9 %
ALBUMIN SERPL-MCNC: 4.2 G/DL
ALBUMIN/GLOB SERPL: 1.4 RATIO
ALPHA1 GLOB MFR SERPL ELPH: 4.5 %
ALPHA1 GLOB SERPL ELPH-MCNC: 0.3 G/DL
ALPHA2 GLOB MFR SERPL ELPH: 9.2 %
ALPHA2 GLOB SERPL ELPH-MCNC: 0.7 G/DL
B-GLOBULIN MFR SERPL ELPH: 11.2 %
B-GLOBULIN SERPL ELPH-MCNC: 0.8 G/DL
CK SERPL-CCNC: 86 U/L
ERYTHROCYTE [SEDIMENTATION RATE] IN BLOOD BY WESTERGREN METHOD: 9 MM/HR
ESTIMATED AVERAGE GLUCOSE: 105 MG/DL
FOLATE SERPL-MCNC: 3.8 NG/ML
GAMMA GLOB FLD ELPH-MCNC: 1.2 G/DL
GAMMA GLOB MFR SERPL ELPH: 16.2 %
HBA1C MFR BLD HPLC: 5.3 %
INTERPRETATION SERPL IEP-IMP: NORMAL
M PROTEIN SPEC IFE-MCNC: NORMAL
PROT SERPL-MCNC: 7.2 G/DL
PROT SERPL-MCNC: 7.2 G/DL
TSH SERPL-ACNC: 1.21 UIU/ML
VIT B12 SERPL-MCNC: 1842 PG/ML

## 2017-11-01 LAB — COPPER SERPL-MCNC: 112 UG/DL

## 2017-11-02 LAB — ZINC SERPL-MCNC: 97 UG/DL

## 2017-11-03 ENCOUNTER — MOBILE ON CALL (OUTPATIENT)
Age: 72
End: 2017-11-03

## 2017-11-05 LAB
A-TOCOPHEROL VIT E SERPL-MCNC: 28.5 MG/L
BETA+GAMMA TOCOPHEROL SERPL-MCNC: <1 MG/L
METHYLMALONATE SERPL-SCNC: 199 NMOL/L
VIT B6 SERPL-MCNC: 11.9 UG/L

## 2017-11-08 LAB — VIT B1 SERPL-MCNC: 125.3 NMOL/L

## 2018-07-23 PROBLEM — Z78.9 ALCOHOL USE: Status: ACTIVE | Noted: 2017-10-05

## 2019-05-21 PROBLEM — K22.70 BARRETT'S ESOPHAGUS WITHOUT DYSPLASIA: Chronic | Status: ACTIVE | Noted: 2017-10-01

## 2019-05-21 PROBLEM — C55 MALIGNANT NEOPLASM OF UTERUS, PART UNSPECIFIED: Chronic | Status: ACTIVE | Noted: 2017-10-01

## 2019-05-21 PROBLEM — G62.9 POLYNEUROPATHY, UNSPECIFIED: Chronic | Status: ACTIVE | Noted: 2017-10-01

## 2019-05-21 PROBLEM — K75.81 NONALCOHOLIC STEATOHEPATITIS (NASH): Chronic | Status: ACTIVE | Noted: 2017-10-01

## 2019-05-21 PROBLEM — E78.5 HYPERLIPIDEMIA, UNSPECIFIED: Chronic | Status: ACTIVE | Noted: 2017-10-01

## 2019-05-21 PROBLEM — I10 ESSENTIAL (PRIMARY) HYPERTENSION: Chronic | Status: ACTIVE | Noted: 2017-10-01

## 2019-06-10 ENCOUNTER — APPOINTMENT (OUTPATIENT)
Dept: VASCULAR SURGERY | Facility: CLINIC | Age: 74
End: 2019-06-10
Payer: MEDICARE

## 2019-06-10 PROCEDURE — 99203 OFFICE O/P NEW LOW 30 MIN: CPT

## 2019-06-10 PROCEDURE — 93970 EXTREMITY STUDY: CPT

## 2019-06-13 NOTE — HISTORY OF PRESENT ILLNESS
[FreeTextEntry1] : 73 y/o F former smoker with HTN, HLD, DM and h/o cancer presents today for initial evaluation of neuropathy and bilateral leg swelling, referred by Dr. Hylton. She reports that she's had a long history of leg swelling and recently her thighs have been aching when she walks. She also reports SOB when walking and that she has to stop about 3 times in one block to rest. \par Patient presents with her niece.

## 2019-06-13 NOTE — ADDENDUM
[FreeTextEntry1] : This note was written by Nunu Skelton on 06/10/2019  acting as scribe for Dr. Serrano.

## 2019-06-13 NOTE — ASSESSMENT
[FreeTextEntry1] : 75 y/o F former smoker with HTN, HLD, DM, neuropathy and h/o cancer presents with BLE edema. BLE US demonstrates no evidence of DVT or SVT bilaterally. I suspect the BLE edema she has is due to her weight and her diet. Her neuropathy is from her DM and her SOB is from her sedentary lifestyle. I advised her to become more active and to lose weight. She should try to work with a  twice a week and to walk daily. I advised her to moisturize her BLE twice a day to avoid infection. No vascular intervention needed at this time. She will follow up here as needed.

## 2019-06-13 NOTE — END OF VISIT
[FreeTextEntry3] : All medical record entries made by the Scribe were at my, Dr. Zepeda's, discretion and personally dictated by me on 06/10/2019 . I have reviewed the chart and agree that the record accurately reflects my personal performance of the history, physical exam, assessment and plan. I have also personally directed, reviewed and agreed to the chart.

## 2019-06-13 NOTE — PHYSICAL EXAM
[Ankle Swelling Bilaterally] : bilaterally  [Ankle Swelling (On Exam)] : present [Calm] : calm [Ankle Swelling On The Right] : mild [Alert] : alert [2+] : left 2+ [JVD] : no jugular venous distention  [de-identified] : Overweight appearing. NAD [de-identified] : NCAT [de-identified] : dry skin on b/l shins

## 2019-11-18 ENCOUNTER — APPOINTMENT (OUTPATIENT)
Dept: VASCULAR SURGERY | Facility: CLINIC | Age: 74
End: 2019-11-18
Payer: MEDICARE

## 2019-11-18 VITALS
WEIGHT: 195 LBS | OXYGEN SATURATION: 86 % | HEART RATE: 62 BPM | BODY MASS INDEX: 36.82 KG/M2 | HEIGHT: 61 IN | DIASTOLIC BLOOD PRESSURE: 61 MMHG | TEMPERATURE: 97.6 F | SYSTOLIC BLOOD PRESSURE: 110 MMHG

## 2019-11-18 PROCEDURE — 99213 OFFICE O/P EST LOW 20 MIN: CPT

## 2019-11-19 NOTE — ASSESSMENT
[FreeTextEntry1] : 73 yo F with peripheral neuropathy and bilateral LEs edema returns for a f/u.\par Patient with healed L LE ulcerations and moderate edema.\par She was recommended to wear compression stockings to prevent skin breakdown.\par Patient was provided with prescription to order stockings with a zipper.\par No vascular issues at this time.\par F/u as necessary.

## 2019-11-19 NOTE — HISTORY OF PRESENT ILLNESS
[FreeTextEntry1] : 73 y/o F who was seen in the office in June of this year due to peripheral neuropathy and bilateral leg swelling, returns for a f/u. She reports that her swelling progressively became worse and she developed ulcerations that finally healed. She wants to know if anything else she can do besides wearing compression stockings since she is unable to put them on. She lives alone and doesn't have any assistance.\par

## 2019-11-19 NOTE — PHYSICAL EXAM
[2+] : left 2+ [Ankle Swelling (On Exam)] : present [Ankle Swelling Bilaterally] : bilaterally  [Ankle Swelling On The Right] : mild [Alert] : alert [Calm] : calm [JVD] : no jugular venous distention  [de-identified] : Overweight appearing. NAD [de-identified] : NCAT [de-identified] : dry skin on b/l shins, LLE + scabs from previous ulcerations [de-identified] : grossly intact

## 2020-01-29 ENCOUNTER — EMERGENCY (EMERGENCY)
Facility: HOSPITAL | Age: 75
LOS: 1 days | Discharge: HOME | End: 2020-01-29
Attending: EMERGENCY MEDICINE | Admitting: EMERGENCY MEDICINE
Payer: MEDICARE

## 2020-01-29 VITALS
SYSTOLIC BLOOD PRESSURE: 136 MMHG | WEIGHT: 199.96 LBS | OXYGEN SATURATION: 97 % | TEMPERATURE: 98 F | HEIGHT: 61 IN | HEART RATE: 77 BPM | DIASTOLIC BLOOD PRESSURE: 81 MMHG | RESPIRATION RATE: 18 BRPM

## 2020-01-29 DIAGNOSIS — Z90.49 ACQUIRED ABSENCE OF OTHER SPECIFIED PARTS OF DIGESTIVE TRACT: Chronic | ICD-10-CM

## 2020-01-29 DIAGNOSIS — Z90.710 ACQUIRED ABSENCE OF BOTH CERVIX AND UTERUS: Chronic | ICD-10-CM

## 2020-01-29 DIAGNOSIS — Z98.890 OTHER SPECIFIED POSTPROCEDURAL STATES: Chronic | ICD-10-CM

## 2020-01-29 LAB
ALBUMIN SERPL ELPH-MCNC: 3.9 G/DL — SIGNIFICANT CHANGE UP (ref 3.3–5)
ALP SERPL-CCNC: 76 U/L — SIGNIFICANT CHANGE UP (ref 40–120)
ALT FLD-CCNC: 14 U/L — SIGNIFICANT CHANGE UP (ref 10–45)
ANION GAP SERPL CALC-SCNC: 14 MMOL/L — SIGNIFICANT CHANGE UP (ref 5–17)
APTT BLD: 30.2 SEC — SIGNIFICANT CHANGE UP (ref 27.5–36.3)
AST SERPL-CCNC: 27 U/L — SIGNIFICANT CHANGE UP (ref 10–40)
BASOPHILS # BLD AUTO: 0.04 K/UL — SIGNIFICANT CHANGE UP (ref 0–0.2)
BASOPHILS NFR BLD AUTO: 0.5 % — SIGNIFICANT CHANGE UP (ref 0–2)
BILIRUB SERPL-MCNC: 0.4 MG/DL — SIGNIFICANT CHANGE UP (ref 0.2–1.2)
BUN SERPL-MCNC: 21 MG/DL — SIGNIFICANT CHANGE UP (ref 7–23)
CALCIUM SERPL-MCNC: 9.7 MG/DL — SIGNIFICANT CHANGE UP (ref 8.4–10.5)
CHLORIDE SERPL-SCNC: 100 MMOL/L — SIGNIFICANT CHANGE UP (ref 96–108)
CO2 SERPL-SCNC: 23 MMOL/L — SIGNIFICANT CHANGE UP (ref 22–31)
CREAT SERPL-MCNC: 1.29 MG/DL — SIGNIFICANT CHANGE UP (ref 0.5–1.3)
EOSINOPHIL # BLD AUTO: 0.11 K/UL — SIGNIFICANT CHANGE UP (ref 0–0.5)
EOSINOPHIL NFR BLD AUTO: 1.5 % — SIGNIFICANT CHANGE UP (ref 0–6)
GLUCOSE SERPL-MCNC: 162 MG/DL — HIGH (ref 70–99)
HCT VFR BLD CALC: 38.7 % — SIGNIFICANT CHANGE UP (ref 34.5–45)
HGB BLD-MCNC: 12 G/DL — SIGNIFICANT CHANGE UP (ref 11.5–15.5)
IMM GRANULOCYTES NFR BLD AUTO: 0.8 % — SIGNIFICANT CHANGE UP (ref 0–1.5)
INR BLD: 1.14 — SIGNIFICANT CHANGE UP (ref 0.88–1.16)
LYMPHOCYTES # BLD AUTO: 1.17 K/UL — SIGNIFICANT CHANGE UP (ref 1–3.3)
LYMPHOCYTES # BLD AUTO: 15.7 % — SIGNIFICANT CHANGE UP (ref 13–44)
MCHC RBC-ENTMCNC: 28.7 PG — SIGNIFICANT CHANGE UP (ref 27–34)
MCHC RBC-ENTMCNC: 31 GM/DL — LOW (ref 32–36)
MCV RBC AUTO: 92.6 FL — SIGNIFICANT CHANGE UP (ref 80–100)
MONOCYTES # BLD AUTO: 0.65 K/UL — SIGNIFICANT CHANGE UP (ref 0–0.9)
MONOCYTES NFR BLD AUTO: 8.7 % — SIGNIFICANT CHANGE UP (ref 2–14)
NEUTROPHILS # BLD AUTO: 5.44 K/UL — SIGNIFICANT CHANGE UP (ref 1.8–7.4)
NEUTROPHILS NFR BLD AUTO: 72.8 % — SIGNIFICANT CHANGE UP (ref 43–77)
NRBC # BLD: 0 /100 WBCS — SIGNIFICANT CHANGE UP (ref 0–0)
PLATELET # BLD AUTO: 195 K/UL — SIGNIFICANT CHANGE UP (ref 150–400)
POTASSIUM SERPL-MCNC: 4.6 MMOL/L — SIGNIFICANT CHANGE UP (ref 3.5–5.3)
POTASSIUM SERPL-SCNC: 4.6 MMOL/L — SIGNIFICANT CHANGE UP (ref 3.5–5.3)
PROT SERPL-MCNC: 7 G/DL — SIGNIFICANT CHANGE UP (ref 6–8.3)
PROTHROM AB SERPL-ACNC: 13 SEC — HIGH (ref 10–12.9)
RBC # BLD: 4.18 M/UL — SIGNIFICANT CHANGE UP (ref 3.8–5.2)
RBC # FLD: 18.7 % — HIGH (ref 10.3–14.5)
SODIUM SERPL-SCNC: 137 MMOL/L — SIGNIFICANT CHANGE UP (ref 135–145)
WBC # BLD: 7.47 K/UL — SIGNIFICANT CHANGE UP (ref 3.8–10.5)
WBC # FLD AUTO: 7.47 K/UL — SIGNIFICANT CHANGE UP (ref 3.8–10.5)

## 2020-01-29 PROCEDURE — 99284 EMERGENCY DEPT VISIT MOD MDM: CPT | Mod: 25

## 2020-01-29 PROCEDURE — 85025 COMPLETE CBC W/AUTO DIFF WBC: CPT

## 2020-01-29 PROCEDURE — 99284 EMERGENCY DEPT VISIT MOD MDM: CPT

## 2020-01-29 PROCEDURE — 36415 COLL VENOUS BLD VENIPUNCTURE: CPT

## 2020-01-29 PROCEDURE — 80053 COMPREHEN METABOLIC PANEL: CPT

## 2020-01-29 PROCEDURE — 85610 PROTHROMBIN TIME: CPT

## 2020-01-29 PROCEDURE — 85730 THROMBOPLASTIN TIME PARTIAL: CPT

## 2020-01-29 PROCEDURE — 96366 THER/PROPH/DIAG IV INF ADDON: CPT

## 2020-01-29 PROCEDURE — 96365 THER/PROPH/DIAG IV INF INIT: CPT

## 2020-01-29 RX ORDER — VANCOMYCIN HCL 1 G
1500 VIAL (EA) INTRAVENOUS ONCE
Refills: 0 | Status: COMPLETED | OUTPATIENT
Start: 2020-01-29 | End: 2020-01-29

## 2020-01-29 RX ADMIN — Medication 300 MILLIGRAM(S): at 22:29

## 2020-01-29 NOTE — ED PROVIDER NOTE - PATIENT PORTAL LINK FT
You can access the FollowMyHealth Patient Portal offered by [facility] by registering at the following website: [email or web address]. By joining [facility]’s FollowMyHealth portal, you will also be able to view your health information using other applications (apps) compatible with our system.

## 2020-01-29 NOTE — ED ADULT NURSE NOTE - OBJECTIVE STATEMENT
Patient aox3 and ambulatory with steady gait upon arrival. Patient c/o weeping wounds and blisters to bilateral lower legs/ankles x 2 days. Patient states this has happened to her in the past, approximately 1 year ago. Patient went to urgent care earlier and they told her to come to ED for IV abx. Patient c/o tingling and burning in bilateral feet due to peripheral neuropathy, chronic in nature. Patient has weeping wound to L lateral leg. Patient denies any CP, dizziness, N/V/D, fall/LOC/injury. Patient states she sometimes gets SOB upon exertion; this has been going on for multiple months. PMH: htn, dm, neuropathy  Patient has clear and equal bilateral lung sounds. Patient has positive pedal pulses. Patient has equal strength in bilateral arms and legs.

## 2020-01-29 NOTE — ED ADULT NURSE NOTE - PMH
Atrial fibrillation    Kerr's esophagus    HLD (hyperlipidemia)    HTN (hypertension)    HARRIS (nonalcoholic steatohepatitis)    Neuropathy    Peripheral neuropathy    Uterine cancer

## 2020-01-29 NOTE — ED PROVIDER NOTE - SKIN, MLM
Skin normal color for race, warm, dry and intact. Circular blister on L lateral aspect of lower leg with serous fluid drainage.

## 2020-01-29 NOTE — ED PROVIDER NOTE - PMH
Atrial fibrillation    Kerr's esophagus    HLD (hyperlipidemia)    HTN (hypertension)    HARRIS (nonalcoholic steatohepatitis)    Neuropathy    Peripheral neuropathy    Uterine cancer Atrial fibrillation    Kerr's esophagus    DM (diabetes mellitus)    HLD (hyperlipidemia)    HTN (hypertension)    HARRIS (nonalcoholic steatohepatitis)    Neuropathy    Peripheral neuropathy    Uterine cancer

## 2020-01-29 NOTE — ED PROVIDER NOTE - MUSCULOSKELETAL, MLM
Spine appears normal, range of motion is not limited, no muscle or joint tenderness, 2+ peripheral edema b/l lower legs. Pulses intact.

## 2020-01-29 NOTE — ED ADULT NURSE NOTE - CHPI ED NUR SYMPTOMS NEG
no fever/no bleeding at site/redness/edema/weeping/no purulent drainage/no rectal pain/no chills/no blood in mucus

## 2020-01-29 NOTE — ED PROVIDER NOTE - NSFOLLOWUPINSTRUCTIONS_ED_ALL_ED_FT
Keep wound clean and dry. Do not remove blistered skin. Take antiobiotics as prescribed. Call Dr. Zaidi in the morning for follow up appointment with wound care. Return to ED for increased pain, fever, worsening condition.     Cellulitis    Cellulitis is a skin infection caused by bacteria. This condition occurs most often in the arms and lower legs but can occur anywhere over the body. Symptoms include redness, swelling, warm skin, tenderness, and chills/fever. If you were prescribed an antibiotic medicine, take it as told by your health care provider. Do not stop taking the antibiotic even if you start to feel better.    SEEK IMMEDIATE MEDICAL CARE IF YOU HAVE ANY OF THE FOLLOWING SYMPTOMS: worsening fever, red streaks coming from affected area, vomiting or diarrhea, or dizziness/lightheadedness.

## 2020-01-29 NOTE — ED ADULT NURSE NOTE - CHIEF COMPLAINT QUOTE
left lower leg swelling with abscess went to [facility] and sent here for further evaluation

## 2020-01-29 NOTE — ED PROVIDER NOTE - NS ED MD DISPO DISCHARGE
Home You can access the [facility], offered by [facility], by registering with the following website: [email or web address]

## 2020-01-29 NOTE — ED PROVIDER NOTE - OBJECTIVE STATEMENT
76 y/o F with history of atrial fibrillation, HLD, HTN, uterine CA, neuropathy, HARRIS, DM and chronic venous changes of her legs, presents with complaints of redness to both legs that has been worsening the past 3 days. Pt has a blister to L lower leg that is now draining. Pt states she has been having symptoms for a few days but told family today prompting ED visit. Pt reports a year ago, she had open wounds that were taken care of by a visiting wound care nurse. Pt has no fever and no other complaints; she is otherwise is well appearing.

## 2020-01-29 NOTE — ED ADULT TRIAGE NOTE - CHIEF COMPLAINT QUOTE
left lower leg swelling with abscess went to [facility] and sent here for further evaluation

## 2020-01-29 NOTE — ED PROVIDER NOTE - CLINICAL SUMMARY MEDICAL DECISION MAKING FREE TEXT BOX
76 y/o F with cellulitis to b/l lower extremities. Case discussed with patient's PCP Dr. Zaidi, who agrees with plan for labs, IV abx, and DC. Dr. Zaidi reports pt has chronic leg wounds will f/u with patient in morning regarding wound care. Will give IV vanco, labs wnl, pt well appearing in ED. Rx for doxycyline written and outpatient instructions given.

## 2020-01-30 VITALS
OXYGEN SATURATION: 98 % | RESPIRATION RATE: 18 BRPM | DIASTOLIC BLOOD PRESSURE: 86 MMHG | TEMPERATURE: 98 F | SYSTOLIC BLOOD PRESSURE: 150 MMHG | HEART RATE: 70 BPM

## 2020-01-30 RX ADMIN — Medication 1500 MILLIGRAM(S): at 00:42

## 2020-02-05 DIAGNOSIS — L03.116 CELLULITIS OF LEFT LOWER LIMB: ICD-10-CM

## 2020-02-05 DIAGNOSIS — L03.115 CELLULITIS OF RIGHT LOWER LIMB: ICD-10-CM

## 2020-02-05 DIAGNOSIS — M79.89 OTHER SPECIFIED SOFT TISSUE DISORDERS: ICD-10-CM

## 2020-02-05 DIAGNOSIS — E78.5 HYPERLIPIDEMIA, UNSPECIFIED: ICD-10-CM

## 2020-02-05 DIAGNOSIS — Z79.899 OTHER LONG TERM (CURRENT) DRUG THERAPY: ICD-10-CM

## 2020-03-17 NOTE — ED PROVIDER NOTE - CADM POA URETHRAL CATHETER
Mom and dad at bedside states that pt had preop labs drawn yesterday under anesthesia for dental work. Family was called today and was told that pts potassium was high and to come to the ER. Pt has Autism. Family states pt will fight and become violent and is requesting that we sedate or restraint pt to draw lab and obtain vitals.      Pippa Gaviria RN  03/17/20 [id number]     No

## 2020-12-07 NOTE — ED ADULT NURSE NOTE - DOES PATIENT HAVE ADVANCE DIRECTIVE
No Keystone Flap Text: The defect edges were debeveled with a #15 scalpel blade.  Given the location of the defect, shape of the defect a keystone flap was deemed most appropriate.  Using a sterile surgical marker, an appropriate keystone flap was drawn incorporating the defect, outlining the appropriate donor tissue and placing the expected incisions within the relaxed skin tension lines where possible. The area thus outlined was incised deep to adipose tissue with a #15 scalpel blade.  The skin margins were undermined to an appropriate distance in all directions around the primary defect and laterally outward around the flap utilizing iris scissors.

## 2022-08-04 PROBLEM — E11.9 TYPE 2 DIABETES MELLITUS WITHOUT COMPLICATIONS: Chronic | Status: ACTIVE | Noted: 2020-01-30

## 2022-08-22 ENCOUNTER — APPOINTMENT (OUTPATIENT)
Dept: PULMONOLOGY | Facility: CLINIC | Age: 77
End: 2022-08-22

## 2022-08-22 VITALS
SYSTOLIC BLOOD PRESSURE: 126 MMHG | HEIGHT: 60 IN | OXYGEN SATURATION: 95 % | DIASTOLIC BLOOD PRESSURE: 70 MMHG | HEART RATE: 81 BPM | BODY MASS INDEX: 34.36 KG/M2 | WEIGHT: 175 LBS

## 2022-08-22 DIAGNOSIS — Z00.00 ENCOUNTER FOR GENERAL ADULT MEDICAL EXAMINATION W/OUT ABNORMAL FINDINGS: ICD-10-CM

## 2022-08-22 PROCEDURE — 99204 OFFICE O/P NEW MOD 45 MIN: CPT | Mod: 25

## 2022-08-22 PROCEDURE — 94060 EVALUATION OF WHEEZING: CPT

## 2022-08-22 PROCEDURE — 71046 X-RAY EXAM CHEST 2 VIEWS: CPT

## 2022-08-23 NOTE — PROCEDURE
[FreeTextEntry1] : great difficulty with doing the study\par \par but it looks like s restricted not obstructed loop

## 2022-08-23 NOTE — HISTORY OF PRESENT ILLNESS
[TextBox_4] : diabetes , hypertension\par \par smoker ten sig a day, stopped,\par \par six month ago began being short of breath\par \par six months to two years.\par \par could not do spiroemtry\par \par did ont think it was the heart.\par \par heart mumur-  she can't remember the name,  doesn't recall who the cardiologist is.mt si\par \par alexsandra olmos is the cardiologist who saw her\par \par she is kyphotic,  difficulty walking due to leg swelling vascular stass\par \par overweight

## 2022-08-23 NOTE — DISCUSSION/SUMMARY
[FreeTextEntry1] : this is likely all deconditioning\par \par but will get full pfts since her ability to do the testing was poor.

## 2022-08-23 NOTE — REVIEW OF SYSTEMS
[Cough] : no cough [Sputum] : no sputum [Dyspnea] : dyspnea [Negative] : Psychiatric [TextBox_94] : foot neuropahy?

## 2022-08-23 NOTE — PHYSICAL EXAM
[Normal Oropharynx] : normal oropharynx [Normal Appearance] : normal appearance [No Resp Distress] : no resp distress [Clear to Auscultation Bilaterally] : clear to auscultation bilaterally [Kyphosis] : kyphosis [TextBox_2] : oeverweight and deconditioned [TextBox_54] : systolic m [TextBox_99] : walks with some difficulty for unexplaind reason, uses walker [TextBox_105] : vesnou stasis

## 2022-08-26 ENCOUNTER — APPOINTMENT (OUTPATIENT)
Dept: PULMONOLOGY | Facility: CLINIC | Age: 77
End: 2022-08-26

## 2022-08-26 DIAGNOSIS — J44.9 CHRONIC OBSTRUCTIVE PULMONARY DISEASE, UNSPECIFIED: ICD-10-CM

## 2022-08-26 PROCEDURE — 94729 DIFFUSING CAPACITY: CPT

## 2022-08-26 PROCEDURE — 94727 GAS DIL/WSHOT DETER LNG VOL: CPT

## 2022-08-26 PROCEDURE — 94060 EVALUATION OF WHEEZING: CPT

## 2022-08-29 PROBLEM — J44.9 COPD, SEVERE: Status: ACTIVE | Noted: 2022-08-29

## 2022-09-19 NOTE — ED PROVIDER NOTE - CROS ED ROS STATEMENT
LVM for pt to return call.    ----- Message from DENIZ Sloan sent at 9/16/2022  1:41 PM CDT -----  Please call patient:  1. Cholesterol level is elevated. Given patient's ASCVD risk (see below), would recommend starting a statin medication to lower CV risk and improve cholesterol levels. If patient is agreeable please let me know and I will send medication to the pharmacy. If patient does not wish to start medication at this time, strongly encourage healthy dietary changes (limiting fats, red meats, pork, sugary/sweet foods, deep fried foods, simple carbs, etc) and implementing regular exercise. Repeat panel in 3mos with either option.   2. All other annual labs stable/normal.   3. No immunity to Hepatitis B. Would recommend starting vaccination series if he wishes.     The 10-year ASCVD risk score (Radha CAREY, et al., 2019) is: 6.4%    Values used to calculate the score:      Age: 53 years      Sex: Male      Is Non- : Yes      Diabetic: No      Tobacco smoker: No      Systolic Blood Pressure: 116 mmHg      Is BP treated: No      HDL Cholesterol: 46 mg/dL      Total Cholesterol: 291 mg/dL    
Patient returned call.  Please call him at [phone].  
Pt aware of results.he is agreeable for starting a statin. Med sent and pt aware to redo lab in 3 months  
all other ROS negative except as per HPI

## 2023-04-24 ENCOUNTER — APPOINTMENT (OUTPATIENT)
Dept: PULMONOLOGY | Facility: CLINIC | Age: 78
End: 2023-04-24
Payer: MEDICARE

## 2023-04-24 VITALS
SYSTOLIC BLOOD PRESSURE: 121 MMHG | TEMPERATURE: 97.3 F | HEART RATE: 74 BPM | WEIGHT: 175 LBS | OXYGEN SATURATION: 92 % | BODY MASS INDEX: 34.36 KG/M2 | DIASTOLIC BLOOD PRESSURE: 72 MMHG | HEIGHT: 60 IN

## 2023-04-24 PROCEDURE — 71046 X-RAY EXAM CHEST 2 VIEWS: CPT

## 2023-04-24 PROCEDURE — 94010 BREATHING CAPACITY TEST: CPT

## 2023-04-24 PROCEDURE — 99214 OFFICE O/P EST MOD 30 MIN: CPT | Mod: 25

## 2023-04-26 NOTE — DISCUSSION/SUMMARY
[FreeTextEntry1] : she did have some obstruction with a bronchodilator resopnse  and will again try anoro.\par \par however her major issue is deconditioning and neuropathy.\par \par weill communicate with cardiologist

## 2023-04-26 NOTE — HISTORY OF PRESENT ILLNESS
[TextBox_4] : saw cardiologist \par \par she is confused and can't tell me what her cardiologist.\par \par short of breath on activity\par \par has neuropathy\par \par she has a terrible time telling me what is going on, here with her friend\par \par i think the cardioloigst wants to know if she has lung disease\par \par she actually has minimal lung disease  but is kyphotic,  with neuropathy, and horrible deconditioned.\par \par has modest obstruction to flow only and saturation is ok.\par \par i had given her anoroa samples that she never told me whether or not they helped\par \par \par \par

## 2023-04-26 NOTE — REVIEW OF SYSTEMS
[Dyspnea] : dyspnea [Negative] : Psychiatric [Cough] : no cough [Sputum] : no sputum [TextBox_94] : foot neuropahy?

## 2023-04-26 NOTE — REASON FOR VISIT
[Follow-Up] : a follow-up visit [TextBox_44] : patient with dyspnea but quite unclear on her reason for being here

## 2023-06-12 NOTE — PATIENT PROFILE ADULT. - NSTOBACCONEVERSMOKERY/N_GEN_A
Quality 431: Preventive Care And Screening: Unhealthy Alcohol Use - Screening: Patient not identified as an unhealthy alcohol user when screened for unhealthy alcohol use using a systematic screening method Detail Level: Detailed Quality 226: Preventive Care And Screening: Tobacco Use: Screening And Cessation Intervention: Patient screened for tobacco use and is an ex/non-smoker Quality 111:Pneumonia Vaccination Status For Older Adults: Patient received any pneumococcal conjugate or polysaccharide vaccine on or after their 60th birthday and before the end of the measurement period Quality 130: Documentation Of Current Medications In The Medical Record: Current Medications Documented Yes, Non-Core measure site...

## 2023-10-10 ENCOUNTER — RX RENEWAL (OUTPATIENT)
Age: 78
End: 2023-10-10

## 2023-11-21 ENCOUNTER — APPOINTMENT (OUTPATIENT)
Dept: PULMONOLOGY | Facility: CLINIC | Age: 78
End: 2023-11-21
Payer: MEDICARE

## 2023-11-21 ENCOUNTER — INPATIENT (INPATIENT)
Facility: HOSPITAL | Age: 78
LOS: 8 days | Discharge: HOME HEALTH | DRG: 286 | End: 2023-11-30
Attending: INTERNAL MEDICINE | Admitting: INTERNAL MEDICINE
Payer: MEDICARE

## 2023-11-21 ENCOUNTER — APPOINTMENT (OUTPATIENT)
Dept: PULMONOLOGY | Facility: CLINIC | Age: 78
End: 2023-11-21

## 2023-11-21 VITALS
WEIGHT: 175 LBS | SYSTOLIC BLOOD PRESSURE: 140 MMHG | DIASTOLIC BLOOD PRESSURE: 60 MMHG | HEART RATE: 96 BPM | HEIGHT: 60 IN | BODY MASS INDEX: 34.36 KG/M2 | OXYGEN SATURATION: 96 %

## 2023-11-21 VITALS
TEMPERATURE: 97 F | HEIGHT: 60 IN | DIASTOLIC BLOOD PRESSURE: 54 MMHG | SYSTOLIC BLOOD PRESSURE: 148 MMHG | OXYGEN SATURATION: 100 % | HEART RATE: 66 BPM | WEIGHT: 169.98 LBS | RESPIRATION RATE: 22 BRPM

## 2023-11-21 DIAGNOSIS — Z90.710 ACQUIRED ABSENCE OF BOTH CERVIX AND UTERUS: Chronic | ICD-10-CM

## 2023-11-21 DIAGNOSIS — Z90.49 ACQUIRED ABSENCE OF OTHER SPECIFIED PARTS OF DIGESTIVE TRACT: Chronic | ICD-10-CM

## 2023-11-21 DIAGNOSIS — Z98.890 OTHER SPECIFIED POSTPROCEDURAL STATES: Chronic | ICD-10-CM

## 2023-11-21 LAB
ALBUMIN SERPL ELPH-MCNC: 4.2 G/DL — SIGNIFICANT CHANGE UP (ref 3.3–5)
ALBUMIN SERPL ELPH-MCNC: 4.2 G/DL — SIGNIFICANT CHANGE UP (ref 3.3–5)
ALP SERPL-CCNC: 89 U/L — SIGNIFICANT CHANGE UP (ref 40–120)
ALP SERPL-CCNC: 89 U/L — SIGNIFICANT CHANGE UP (ref 40–120)
ALT FLD-CCNC: 29 U/L — SIGNIFICANT CHANGE UP (ref 10–45)
ALT FLD-CCNC: 29 U/L — SIGNIFICANT CHANGE UP (ref 10–45)
ANION GAP SERPL CALC-SCNC: 17 MMOL/L — SIGNIFICANT CHANGE UP (ref 5–17)
ANION GAP SERPL CALC-SCNC: 17 MMOL/L — SIGNIFICANT CHANGE UP (ref 5–17)
ANISOCYTOSIS BLD QL: SLIGHT — SIGNIFICANT CHANGE UP
ANISOCYTOSIS BLD QL: SLIGHT — SIGNIFICANT CHANGE UP
APTT BLD: 37.1 SEC — HIGH (ref 24.5–35.6)
APTT BLD: 37.1 SEC — HIGH (ref 24.5–35.6)
AST SERPL-CCNC: 36 U/L — SIGNIFICANT CHANGE UP (ref 10–40)
AST SERPL-CCNC: 36 U/L — SIGNIFICANT CHANGE UP (ref 10–40)
BASE EXCESS BLDV CALC-SCNC: -6.8 MMOL/L — LOW (ref -2–3)
BASE EXCESS BLDV CALC-SCNC: -6.8 MMOL/L — LOW (ref -2–3)
BASOPHILS # BLD AUTO: 0.14 K/UL — SIGNIFICANT CHANGE UP (ref 0–0.2)
BASOPHILS # BLD AUTO: 0.14 K/UL — SIGNIFICANT CHANGE UP (ref 0–0.2)
BASOPHILS NFR BLD AUTO: 1.8 % — SIGNIFICANT CHANGE UP (ref 0–2)
BASOPHILS NFR BLD AUTO: 1.8 % — SIGNIFICANT CHANGE UP (ref 0–2)
BILIRUB SERPL-MCNC: 3 MG/DL — HIGH (ref 0.2–1.2)
BILIRUB SERPL-MCNC: 3 MG/DL — HIGH (ref 0.2–1.2)
BUN SERPL-MCNC: 20 MG/DL — SIGNIFICANT CHANGE UP (ref 7–23)
BUN SERPL-MCNC: 20 MG/DL — SIGNIFICANT CHANGE UP (ref 7–23)
BURR CELLS BLD QL SMEAR: PRESENT — SIGNIFICANT CHANGE UP
BURR CELLS BLD QL SMEAR: PRESENT — SIGNIFICANT CHANGE UP
CA-I SERPL-SCNC: 1.18 MMOL/L — SIGNIFICANT CHANGE UP (ref 1.15–1.33)
CA-I SERPL-SCNC: 1.18 MMOL/L — SIGNIFICANT CHANGE UP (ref 1.15–1.33)
CALCIUM SERPL-MCNC: 9.6 MG/DL — SIGNIFICANT CHANGE UP (ref 8.4–10.5)
CALCIUM SERPL-MCNC: 9.6 MG/DL — SIGNIFICANT CHANGE UP (ref 8.4–10.5)
CHLORIDE SERPL-SCNC: 96 MMOL/L — SIGNIFICANT CHANGE UP (ref 96–108)
CHLORIDE SERPL-SCNC: 96 MMOL/L — SIGNIFICANT CHANGE UP (ref 96–108)
CO2 BLDV-SCNC: 21 MMOL/L — LOW (ref 22–26)
CO2 BLDV-SCNC: 21 MMOL/L — LOW (ref 22–26)
CO2 SERPL-SCNC: 20 MMOL/L — LOW (ref 22–31)
CO2 SERPL-SCNC: 20 MMOL/L — LOW (ref 22–31)
CREAT SERPL-MCNC: 1.05 MG/DL — SIGNIFICANT CHANGE UP (ref 0.5–1.3)
CREAT SERPL-MCNC: 1.05 MG/DL — SIGNIFICANT CHANGE UP (ref 0.5–1.3)
EGFR: 54 ML/MIN/1.73M2 — LOW
EGFR: 54 ML/MIN/1.73M2 — LOW
EOSINOPHIL # BLD AUTO: 0 K/UL — SIGNIFICANT CHANGE UP (ref 0–0.5)
EOSINOPHIL # BLD AUTO: 0 K/UL — SIGNIFICANT CHANGE UP (ref 0–0.5)
EOSINOPHIL NFR BLD AUTO: 0 % — SIGNIFICANT CHANGE UP (ref 0–6)
EOSINOPHIL NFR BLD AUTO: 0 % — SIGNIFICANT CHANGE UP (ref 0–6)
GAS PNL BLDV: 131 MMOL/L — LOW (ref 136–145)
GAS PNL BLDV: 131 MMOL/L — LOW (ref 136–145)
GAS PNL BLDV: SIGNIFICANT CHANGE UP
GAS PNL BLDV: SIGNIFICANT CHANGE UP
GLUCOSE SERPL-MCNC: 78 MG/DL — SIGNIFICANT CHANGE UP (ref 70–99)
GLUCOSE SERPL-MCNC: 78 MG/DL — SIGNIFICANT CHANGE UP (ref 70–99)
HCO3 BLDV-SCNC: 20 MMOL/L — LOW (ref 22–29)
HCO3 BLDV-SCNC: 20 MMOL/L — LOW (ref 22–29)
HCT VFR BLD CALC: 39.9 % — SIGNIFICANT CHANGE UP (ref 34.5–45)
HCT VFR BLD CALC: 39.9 % — SIGNIFICANT CHANGE UP (ref 34.5–45)
HGB BLD-MCNC: 12.9 G/DL — SIGNIFICANT CHANGE UP (ref 11.5–15.5)
HGB BLD-MCNC: 12.9 G/DL — SIGNIFICANT CHANGE UP (ref 11.5–15.5)
INR BLD: 2.97 — HIGH (ref 0.85–1.18)
INR BLD: 2.97 — HIGH (ref 0.85–1.18)
LYMPHOCYTES # BLD AUTO: 0.29 K/UL — LOW (ref 1–3.3)
LYMPHOCYTES # BLD AUTO: 0.29 K/UL — LOW (ref 1–3.3)
LYMPHOCYTES # BLD AUTO: 3.6 % — LOW (ref 13–44)
LYMPHOCYTES # BLD AUTO: 3.6 % — LOW (ref 13–44)
MACROCYTES BLD QL: SLIGHT — SIGNIFICANT CHANGE UP
MACROCYTES BLD QL: SLIGHT — SIGNIFICANT CHANGE UP
MANUAL SMEAR VERIFICATION: SIGNIFICANT CHANGE UP
MANUAL SMEAR VERIFICATION: SIGNIFICANT CHANGE UP
MCHC RBC-ENTMCNC: 30.6 PG — SIGNIFICANT CHANGE UP (ref 27–34)
MCHC RBC-ENTMCNC: 30.6 PG — SIGNIFICANT CHANGE UP (ref 27–34)
MCHC RBC-ENTMCNC: 32.3 GM/DL — SIGNIFICANT CHANGE UP (ref 32–36)
MCHC RBC-ENTMCNC: 32.3 GM/DL — SIGNIFICANT CHANGE UP (ref 32–36)
MCV RBC AUTO: 94.5 FL — SIGNIFICANT CHANGE UP (ref 80–100)
MCV RBC AUTO: 94.5 FL — SIGNIFICANT CHANGE UP (ref 80–100)
MICROCYTES BLD QL: SLIGHT — SIGNIFICANT CHANGE UP
MICROCYTES BLD QL: SLIGHT — SIGNIFICANT CHANGE UP
MONOCYTES # BLD AUTO: 0 K/UL — SIGNIFICANT CHANGE UP (ref 0–0.9)
MONOCYTES # BLD AUTO: 0 K/UL — SIGNIFICANT CHANGE UP (ref 0–0.9)
MONOCYTES NFR BLD AUTO: 0 % — LOW (ref 2–14)
MONOCYTES NFR BLD AUTO: 0 % — LOW (ref 2–14)
NEUTROPHILS # BLD AUTO: 7.51 K/UL — HIGH (ref 1.8–7.4)
NEUTROPHILS # BLD AUTO: 7.51 K/UL — HIGH (ref 1.8–7.4)
NEUTROPHILS NFR BLD AUTO: 93.7 % — HIGH (ref 43–77)
NEUTROPHILS NFR BLD AUTO: 93.7 % — HIGH (ref 43–77)
NEUTS BAND # BLD: 0.9 % — SIGNIFICANT CHANGE UP (ref 0–8)
NEUTS BAND # BLD: 0.9 % — SIGNIFICANT CHANGE UP (ref 0–8)
OVALOCYTES BLD QL SMEAR: SLIGHT — SIGNIFICANT CHANGE UP
OVALOCYTES BLD QL SMEAR: SLIGHT — SIGNIFICANT CHANGE UP
PCO2 BLDV: 42 MMHG — SIGNIFICANT CHANGE UP (ref 39–42)
PCO2 BLDV: 42 MMHG — SIGNIFICANT CHANGE UP (ref 39–42)
PH BLDV: 7.28 — LOW (ref 7.32–7.43)
PH BLDV: 7.28 — LOW (ref 7.32–7.43)
PLAT MORPH BLD: ABNORMAL
PLAT MORPH BLD: ABNORMAL
PLATELET # BLD AUTO: 175 K/UL — SIGNIFICANT CHANGE UP (ref 150–400)
PLATELET # BLD AUTO: 175 K/UL — SIGNIFICANT CHANGE UP (ref 150–400)
PO2 BLDV: 43 MMHG — SIGNIFICANT CHANGE UP (ref 25–45)
PO2 BLDV: 43 MMHG — SIGNIFICANT CHANGE UP (ref 25–45)
POIKILOCYTOSIS BLD QL AUTO: SIGNIFICANT CHANGE UP
POIKILOCYTOSIS BLD QL AUTO: SIGNIFICANT CHANGE UP
POLYCHROMASIA BLD QL SMEAR: SLIGHT — SIGNIFICANT CHANGE UP
POLYCHROMASIA BLD QL SMEAR: SLIGHT — SIGNIFICANT CHANGE UP
POTASSIUM BLDV-SCNC: 5 MMOL/L — SIGNIFICANT CHANGE UP (ref 3.5–5.1)
POTASSIUM BLDV-SCNC: 5 MMOL/L — SIGNIFICANT CHANGE UP (ref 3.5–5.1)
POTASSIUM SERPL-MCNC: 4.7 MMOL/L — SIGNIFICANT CHANGE UP (ref 3.5–5.3)
POTASSIUM SERPL-MCNC: 4.7 MMOL/L — SIGNIFICANT CHANGE UP (ref 3.5–5.3)
POTASSIUM SERPL-SCNC: 4.7 MMOL/L — SIGNIFICANT CHANGE UP (ref 3.5–5.3)
POTASSIUM SERPL-SCNC: 4.7 MMOL/L — SIGNIFICANT CHANGE UP (ref 3.5–5.3)
PROT SERPL-MCNC: 7.1 G/DL — SIGNIFICANT CHANGE UP (ref 6–8.3)
PROT SERPL-MCNC: 7.1 G/DL — SIGNIFICANT CHANGE UP (ref 6–8.3)
PROTHROM AB SERPL-ACNC: 32.8 SEC — HIGH (ref 9.5–13)
PROTHROM AB SERPL-ACNC: 32.8 SEC — HIGH (ref 9.5–13)
RAPID RVP RESULT: DETECTED
RAPID RVP RESULT: DETECTED
RBC # BLD: 4.22 M/UL — SIGNIFICANT CHANGE UP (ref 3.8–5.2)
RBC # BLD: 4.22 M/UL — SIGNIFICANT CHANGE UP (ref 3.8–5.2)
RBC # FLD: 20.3 % — HIGH (ref 10.3–14.5)
RBC # FLD: 20.3 % — HIGH (ref 10.3–14.5)
RBC BLD AUTO: ABNORMAL
RBC BLD AUTO: ABNORMAL
RV+EV RNA SPEC QL NAA+PROBE: DETECTED
RV+EV RNA SPEC QL NAA+PROBE: DETECTED
SAO2 % BLDV: 58.9 % — LOW (ref 67–88)
SAO2 % BLDV: 58.9 % — LOW (ref 67–88)
SARS-COV-2 RNA SPEC QL NAA+PROBE: SIGNIFICANT CHANGE UP
SARS-COV-2 RNA SPEC QL NAA+PROBE: SIGNIFICANT CHANGE UP
SODIUM SERPL-SCNC: 133 MMOL/L — LOW (ref 135–145)
SODIUM SERPL-SCNC: 133 MMOL/L — LOW (ref 135–145)
TROPONIN T, HIGH SENSITIVITY RESULT: 27 NG/L — SIGNIFICANT CHANGE UP (ref 0–51)
TROPONIN T, HIGH SENSITIVITY RESULT: 27 NG/L — SIGNIFICANT CHANGE UP (ref 0–51)
TROPONIN T, HIGH SENSITIVITY RESULT: 29 NG/L — SIGNIFICANT CHANGE UP (ref 0–51)
TROPONIN T, HIGH SENSITIVITY RESULT: 29 NG/L — SIGNIFICANT CHANGE UP (ref 0–51)
WBC # BLD: 7.94 K/UL — SIGNIFICANT CHANGE UP (ref 3.8–10.5)
WBC # BLD: 7.94 K/UL — SIGNIFICANT CHANGE UP (ref 3.8–10.5)
WBC # FLD AUTO: 7.94 K/UL — SIGNIFICANT CHANGE UP (ref 3.8–10.5)
WBC # FLD AUTO: 7.94 K/UL — SIGNIFICANT CHANGE UP (ref 3.8–10.5)

## 2023-11-21 PROCEDURE — 71045 X-RAY EXAM CHEST 1 VIEW: CPT | Mod: 26

## 2023-11-21 PROCEDURE — 99285 EMERGENCY DEPT VISIT HI MDM: CPT | Mod: FS

## 2023-11-21 PROCEDURE — 99215 OFFICE O/P EST HI 40 MIN: CPT

## 2023-11-21 RX ORDER — FUROSEMIDE 40 MG
20 TABLET ORAL ONCE
Refills: 0 | Status: COMPLETED | OUTPATIENT
Start: 2023-11-21 | End: 2023-11-21

## 2023-11-21 RX ORDER — IPRATROPIUM/ALBUTEROL SULFATE 18-103MCG
3 AEROSOL WITH ADAPTER (GRAM) INHALATION ONCE
Refills: 0 | Status: COMPLETED | OUTPATIENT
Start: 2023-11-21 | End: 2023-11-21

## 2023-11-21 RX ADMIN — Medication 3 MILLILITER(S): at 21:09

## 2023-11-21 RX ADMIN — Medication 20 MILLIGRAM(S): at 22:06

## 2023-11-21 NOTE — ED ADULT NURSE NOTE - OBJECTIVE STATEMENT
Patient presents to the ED from doctor's office complaining of shortness of breath and dyspnea on exertion for the past two days. Patient denies any chest pain. Denies any cough or fever. Patient with a history of a-fib. Patient noted to have bilateral lower leg swelling. Patient placed on 2L nc by EMS.

## 2023-11-21 NOTE — ED PROVIDER NOTE - CLINICAL SUMMARY MEDICAL DECISION MAKING FREE TEXT BOX
77 yo f  poor historian with pmh  of atrial fibrillation on xarelto, HLD, HTN, uterine CA, neuropathy, DM and chronic venous changes of her leg c/o sob x 2 days. Pt reports chronic sob when walking outside but now even going a few steps in her apartment she feels sob. Worse when lying flat. Pt states she sleeps on the couch x 6 months but is unsure if it is because she is sob or just because she has trouble sleeping. Denies fever, chills, cough, uri sx, LE swelling, cp, abd pain. O2 sat 93% on RA. no tachycardic. +tacypneic with speaking. Lungs without wheezing, rales or rhonchi. Labs, cxr r/o acs r/o chf r/o pna

## 2023-11-21 NOTE — ED PROVIDER NOTE - OBJECTIVE STATEMENT
77 yo f  poor historian with pmh  of atrial fibrillation on xarelto, HLD, HTN, uterine CA, neuropathy, DM and chronic venous changes of her leg c/o sob x 2 days. Pt reports chronic sob when walking outside but now even going a few steps in her apartment she feels sob. Worse when lying flat. Pt states she sleeps on the couch x 6 months but is unsure if it is because she is sob or just because she has trouble sleeping. Denies fever, chills, cough, uri sx, LE swelling, cp, abd pain. As per Dr. schulz note from april pt has mild restrictive disease due to kyphosis and was given ellipta inhaler samples. Pt states Dr. pryor sent her in today.

## 2023-11-21 NOTE — ED PROVIDER NOTE - PHYSICAL EXAMINATION
CONSTITUTIONAL: sob with speaking   HEAD: Normocephalic; atraumatic.   EYES: PERRL; EOM intact; conjunctiva and sclera clear  ENT: normal nose; no rhinorrhea; normal pharynx with no erythema or lesions.   NECK: Supple; non-tender; no LAD  CARDIOVASCULAR: Normal S1, S2; No audible murmurs. Regular rate and rhythm.   RESPIRATORY: tachypneic, lungs cta b/l, no wheezing or rhonchi    GI: Soft; non-distended; non-tender; no palpable organomegaly.   MSK: FROM at all extremities, normal tone   EXT: chronic venous stasis changes- +hyperpigmentation, +scaling of skin with mild edema   SKIN: Normal for age and race; warm; dry; good turgor; no apparent lesions or rash.   NEURO: A & O x 3; face symmetric; grossly unremarkable.   PSYCHOLOGICAL: The patient’s mood and manner are appropriate.

## 2023-11-21 NOTE — ED PROVIDER NOTE - NS ED ATTENDING STATEMENT MOD
This was a shared visit with the [place]. I reviewed and verified the documentation and independently performed the documented:

## 2023-11-21 NOTE — ED ADULT NURSE NOTE - NSFALLHARMRISKINTERV_ED_ALL_ED

## 2023-11-21 NOTE — ED PROVIDER NOTE - PROGRESS NOTE DETAILS
bnp elevated, pt given 20 IV lasix. RVP +entero/rhinovirus, pt with no URI symptoms, will admit to cardio for  r/o chf

## 2023-11-22 DIAGNOSIS — I10 ESSENTIAL (PRIMARY) HYPERTENSION: ICD-10-CM

## 2023-11-22 DIAGNOSIS — E11.9 TYPE 2 DIABETES MELLITUS WITHOUT COMPLICATIONS: ICD-10-CM

## 2023-11-22 DIAGNOSIS — R06.02 SHORTNESS OF BREATH: ICD-10-CM

## 2023-11-22 DIAGNOSIS — E78.5 HYPERLIPIDEMIA, UNSPECIFIED: ICD-10-CM

## 2023-11-22 DIAGNOSIS — I48.91 UNSPECIFIED ATRIAL FIBRILLATION: ICD-10-CM

## 2023-11-22 LAB
A1C WITH ESTIMATED AVERAGE GLUCOSE RESULT: 5.7 % — HIGH (ref 4–5.6)
A1C WITH ESTIMATED AVERAGE GLUCOSE RESULT: 5.7 % — HIGH (ref 4–5.6)
ALBUMIN SERPL ELPH-MCNC: 3.7 G/DL — SIGNIFICANT CHANGE UP (ref 3.3–5)
ALBUMIN SERPL ELPH-MCNC: 3.7 G/DL — SIGNIFICANT CHANGE UP (ref 3.3–5)
ALBUMIN SERPL ELPH-MCNC: 3.9 G/DL — SIGNIFICANT CHANGE UP (ref 3.3–5)
ALBUMIN SERPL ELPH-MCNC: 3.9 G/DL — SIGNIFICANT CHANGE UP (ref 3.3–5)
ALP SERPL-CCNC: 82 U/L — SIGNIFICANT CHANGE UP (ref 40–120)
ALT FLD-CCNC: 22 U/L — SIGNIFICANT CHANGE UP (ref 10–45)
ALT FLD-CCNC: 22 U/L — SIGNIFICANT CHANGE UP (ref 10–45)
ALT FLD-CCNC: 23 U/L — SIGNIFICANT CHANGE UP (ref 10–45)
ALT FLD-CCNC: 23 U/L — SIGNIFICANT CHANGE UP (ref 10–45)
ANION GAP SERPL CALC-SCNC: 18 MMOL/L — HIGH (ref 5–17)
ANION GAP SERPL CALC-SCNC: 18 MMOL/L — HIGH (ref 5–17)
AST SERPL-CCNC: 22 U/L — SIGNIFICANT CHANGE UP (ref 10–40)
AST SERPL-CCNC: 22 U/L — SIGNIFICANT CHANGE UP (ref 10–40)
AST SERPL-CCNC: 25 U/L — SIGNIFICANT CHANGE UP (ref 10–40)
AST SERPL-CCNC: 25 U/L — SIGNIFICANT CHANGE UP (ref 10–40)
BILIRUB DIRECT SERPL-MCNC: 0.8 MG/DL — HIGH (ref 0–0.3)
BILIRUB DIRECT SERPL-MCNC: 0.8 MG/DL — HIGH (ref 0–0.3)
BILIRUB DIRECT SERPL-MCNC: 1.1 MG/DL — HIGH (ref 0–0.3)
BILIRUB DIRECT SERPL-MCNC: 1.1 MG/DL — HIGH (ref 0–0.3)
BILIRUB INDIRECT FLD-MCNC: 1.2 MG/DL — HIGH (ref 0.2–1)
BILIRUB INDIRECT FLD-MCNC: 1.2 MG/DL — HIGH (ref 0.2–1)
BILIRUB INDIRECT FLD-MCNC: 1.6 MG/DL — HIGH (ref 0.2–1)
BILIRUB INDIRECT FLD-MCNC: 1.6 MG/DL — HIGH (ref 0.2–1)
BILIRUB SERPL-MCNC: 2 MG/DL — HIGH (ref 0.2–1.2)
BILIRUB SERPL-MCNC: 2 MG/DL — HIGH (ref 0.2–1.2)
BILIRUB SERPL-MCNC: 2.7 MG/DL — HIGH (ref 0.2–1.2)
BILIRUB SERPL-MCNC: 2.7 MG/DL — HIGH (ref 0.2–1.2)
BUN SERPL-MCNC: 20 MG/DL — SIGNIFICANT CHANGE UP (ref 7–23)
BUN SERPL-MCNC: 20 MG/DL — SIGNIFICANT CHANGE UP (ref 7–23)
CALCIUM SERPL-MCNC: 9.7 MG/DL — SIGNIFICANT CHANGE UP (ref 8.4–10.5)
CALCIUM SERPL-MCNC: 9.7 MG/DL — SIGNIFICANT CHANGE UP (ref 8.4–10.5)
CHLORIDE SERPL-SCNC: 98 MMOL/L — SIGNIFICANT CHANGE UP (ref 96–108)
CHLORIDE SERPL-SCNC: 98 MMOL/L — SIGNIFICANT CHANGE UP (ref 96–108)
CHOLEST SERPL-MCNC: 130 MG/DL — SIGNIFICANT CHANGE UP
CHOLEST SERPL-MCNC: 130 MG/DL — SIGNIFICANT CHANGE UP
CO2 SERPL-SCNC: 21 MMOL/L — LOW (ref 22–31)
CO2 SERPL-SCNC: 21 MMOL/L — LOW (ref 22–31)
CREAT SERPL-MCNC: 1.12 MG/DL — SIGNIFICANT CHANGE UP (ref 0.5–1.3)
CREAT SERPL-MCNC: 1.12 MG/DL — SIGNIFICANT CHANGE UP (ref 0.5–1.3)
D DIMER BLD IA.RAPID-MCNC: <150 NG/ML DDU — SIGNIFICANT CHANGE UP
D DIMER BLD IA.RAPID-MCNC: <150 NG/ML DDU — SIGNIFICANT CHANGE UP
EGFR: 50 ML/MIN/1.73M2 — LOW
EGFR: 50 ML/MIN/1.73M2 — LOW
ESTIMATED AVERAGE GLUCOSE: 117 MG/DL — HIGH (ref 68–114)
ESTIMATED AVERAGE GLUCOSE: 117 MG/DL — HIGH (ref 68–114)
GLUCOSE BLDC GLUCOMTR-MCNC: 102 MG/DL — HIGH (ref 70–99)
GLUCOSE BLDC GLUCOMTR-MCNC: 102 MG/DL — HIGH (ref 70–99)
GLUCOSE BLDC GLUCOMTR-MCNC: 139 MG/DL — HIGH (ref 70–99)
GLUCOSE BLDC GLUCOMTR-MCNC: 139 MG/DL — HIGH (ref 70–99)
GLUCOSE BLDC GLUCOMTR-MCNC: 148 MG/DL — HIGH (ref 70–99)
GLUCOSE BLDC GLUCOMTR-MCNC: 148 MG/DL — HIGH (ref 70–99)
GLUCOSE BLDC GLUCOMTR-MCNC: 79 MG/DL — SIGNIFICANT CHANGE UP (ref 70–99)
GLUCOSE BLDC GLUCOMTR-MCNC: 79 MG/DL — SIGNIFICANT CHANGE UP (ref 70–99)
GLUCOSE SERPL-MCNC: 97 MG/DL — SIGNIFICANT CHANGE UP (ref 70–99)
GLUCOSE SERPL-MCNC: 97 MG/DL — SIGNIFICANT CHANGE UP (ref 70–99)
HCT VFR BLD CALC: 40.5 % — SIGNIFICANT CHANGE UP (ref 34.5–45)
HCT VFR BLD CALC: 40.5 % — SIGNIFICANT CHANGE UP (ref 34.5–45)
HDLC SERPL-MCNC: 54 MG/DL — SIGNIFICANT CHANGE UP
HDLC SERPL-MCNC: 54 MG/DL — SIGNIFICANT CHANGE UP
HGB BLD-MCNC: 12.7 G/DL — SIGNIFICANT CHANGE UP (ref 11.5–15.5)
HGB BLD-MCNC: 12.7 G/DL — SIGNIFICANT CHANGE UP (ref 11.5–15.5)
LIPID PNL WITH DIRECT LDL SERPL: 58 MG/DL — SIGNIFICANT CHANGE UP
LIPID PNL WITH DIRECT LDL SERPL: 58 MG/DL — SIGNIFICANT CHANGE UP
MAGNESIUM SERPL-MCNC: 1.8 MG/DL — SIGNIFICANT CHANGE UP (ref 1.6–2.6)
MAGNESIUM SERPL-MCNC: 1.8 MG/DL — SIGNIFICANT CHANGE UP (ref 1.6–2.6)
MCHC RBC-ENTMCNC: 30.5 PG — SIGNIFICANT CHANGE UP (ref 27–34)
MCHC RBC-ENTMCNC: 30.5 PG — SIGNIFICANT CHANGE UP (ref 27–34)
MCHC RBC-ENTMCNC: 31.4 GM/DL — LOW (ref 32–36)
MCHC RBC-ENTMCNC: 31.4 GM/DL — LOW (ref 32–36)
MCV RBC AUTO: 97.1 FL — SIGNIFICANT CHANGE UP (ref 80–100)
MCV RBC AUTO: 97.1 FL — SIGNIFICANT CHANGE UP (ref 80–100)
NON HDL CHOLESTEROL: 76 MG/DL — SIGNIFICANT CHANGE UP
NON HDL CHOLESTEROL: 76 MG/DL — SIGNIFICANT CHANGE UP
NRBC # BLD: 0 /100 WBCS — SIGNIFICANT CHANGE UP (ref 0–0)
NRBC # BLD: 0 /100 WBCS — SIGNIFICANT CHANGE UP (ref 0–0)
PLATELET # BLD AUTO: 169 K/UL — SIGNIFICANT CHANGE UP (ref 150–400)
PLATELET # BLD AUTO: 169 K/UL — SIGNIFICANT CHANGE UP (ref 150–400)
POTASSIUM SERPL-MCNC: 4.1 MMOL/L — SIGNIFICANT CHANGE UP (ref 3.5–5.3)
POTASSIUM SERPL-MCNC: 4.1 MMOL/L — SIGNIFICANT CHANGE UP (ref 3.5–5.3)
POTASSIUM SERPL-SCNC: 4.1 MMOL/L — SIGNIFICANT CHANGE UP (ref 3.5–5.3)
POTASSIUM SERPL-SCNC: 4.1 MMOL/L — SIGNIFICANT CHANGE UP (ref 3.5–5.3)
PROT SERPL-MCNC: 6.6 G/DL — SIGNIFICANT CHANGE UP (ref 6–8.3)
PROT SERPL-MCNC: 6.6 G/DL — SIGNIFICANT CHANGE UP (ref 6–8.3)
PROT SERPL-MCNC: 6.7 G/DL — SIGNIFICANT CHANGE UP (ref 6–8.3)
PROT SERPL-MCNC: 6.7 G/DL — SIGNIFICANT CHANGE UP (ref 6–8.3)
RBC # BLD: 4.17 M/UL — SIGNIFICANT CHANGE UP (ref 3.8–5.2)
RBC # BLD: 4.17 M/UL — SIGNIFICANT CHANGE UP (ref 3.8–5.2)
RBC # FLD: 20.6 % — HIGH (ref 10.3–14.5)
RBC # FLD: 20.6 % — HIGH (ref 10.3–14.5)
SODIUM SERPL-SCNC: 137 MMOL/L — SIGNIFICANT CHANGE UP (ref 135–145)
SODIUM SERPL-SCNC: 137 MMOL/L — SIGNIFICANT CHANGE UP (ref 135–145)
T4 AB SER-ACNC: 6.98 UG/DL — SIGNIFICANT CHANGE UP (ref 4.5–11.7)
T4 AB SER-ACNC: 6.98 UG/DL — SIGNIFICANT CHANGE UP (ref 4.5–11.7)
TRIGL SERPL-MCNC: 91 MG/DL — SIGNIFICANT CHANGE UP
TRIGL SERPL-MCNC: 91 MG/DL — SIGNIFICANT CHANGE UP
TSH SERPL-MCNC: 0.93 UIU/ML — SIGNIFICANT CHANGE UP (ref 0.27–4.2)
TSH SERPL-MCNC: 0.93 UIU/ML — SIGNIFICANT CHANGE UP (ref 0.27–4.2)
WBC # BLD: 7.28 K/UL — SIGNIFICANT CHANGE UP (ref 3.8–10.5)
WBC # BLD: 7.28 K/UL — SIGNIFICANT CHANGE UP (ref 3.8–10.5)
WBC # FLD AUTO: 7.28 K/UL — SIGNIFICANT CHANGE UP (ref 3.8–10.5)
WBC # FLD AUTO: 7.28 K/UL — SIGNIFICANT CHANGE UP (ref 3.8–10.5)

## 2023-11-22 PROCEDURE — 99232 SBSQ HOSP IP/OBS MODERATE 35: CPT | Mod: GC

## 2023-11-22 PROCEDURE — 76604 US EXAM CHEST: CPT | Mod: 26,GC

## 2023-11-22 PROCEDURE — 93306 TTE W/DOPPLER COMPLETE: CPT | Mod: 26

## 2023-11-22 PROCEDURE — 99223 1ST HOSP IP/OBS HIGH 75: CPT

## 2023-11-22 RX ORDER — DEXTROSE 50 % IN WATER 50 %
12.5 SYRINGE (ML) INTRAVENOUS ONCE
Refills: 0 | Status: DISCONTINUED | OUTPATIENT
Start: 2023-11-22 | End: 2023-11-23

## 2023-11-22 RX ORDER — PANTOPRAZOLE SODIUM 20 MG/1
40 TABLET, DELAYED RELEASE ORAL
Refills: 0 | Status: DISCONTINUED | OUTPATIENT
Start: 2023-11-22 | End: 2023-11-30

## 2023-11-22 RX ORDER — SODIUM CHLORIDE 9 MG/ML
1000 INJECTION, SOLUTION INTRAVENOUS
Refills: 0 | Status: DISCONTINUED | OUTPATIENT
Start: 2023-11-22 | End: 2023-11-23

## 2023-11-22 RX ORDER — SODIUM CHLORIDE 0.65 %
1 AEROSOL, SPRAY (ML) NASAL
Refills: 0 | Status: DISCONTINUED | OUTPATIENT
Start: 2023-11-22 | End: 2023-11-30

## 2023-11-22 RX ORDER — ASPIRIN/CALCIUM CARB/MAGNESIUM 324 MG
1 TABLET ORAL
Qty: 0 | Refills: 0 | DISCHARGE

## 2023-11-22 RX ORDER — NYSTATIN CREAM 100000 [USP'U]/G
1 CREAM TOPICAL
Refills: 0 | Status: DISCONTINUED | OUTPATIENT
Start: 2023-11-22 | End: 2023-11-30

## 2023-11-22 RX ORDER — ATORVASTATIN CALCIUM 80 MG/1
40 TABLET, FILM COATED ORAL AT BEDTIME
Refills: 0 | Status: DISCONTINUED | OUTPATIENT
Start: 2023-11-22 | End: 2023-11-30

## 2023-11-22 RX ORDER — DEXTROSE 50 % IN WATER 50 %
15 SYRINGE (ML) INTRAVENOUS ONCE
Refills: 0 | Status: DISCONTINUED | OUTPATIENT
Start: 2023-11-22 | End: 2023-11-23

## 2023-11-22 RX ORDER — METOPROLOL TARTRATE 50 MG
25 TABLET ORAL
Refills: 0 | Status: DISCONTINUED | OUTPATIENT
Start: 2023-11-22 | End: 2023-11-23

## 2023-11-22 RX ORDER — NEBIVOLOL HYDROCHLORIDE 5 MG/1
1 TABLET ORAL
Qty: 0 | Refills: 0 | DISCHARGE

## 2023-11-22 RX ORDER — DEXTROSE 50 % IN WATER 50 %
25 SYRINGE (ML) INTRAVENOUS ONCE
Refills: 0 | Status: DISCONTINUED | OUTPATIENT
Start: 2023-11-22 | End: 2023-11-23

## 2023-11-22 RX ORDER — ZOLPIDEM TARTRATE 10 MG/1
5 TABLET ORAL AT BEDTIME
Refills: 0 | Status: DISCONTINUED | OUTPATIENT
Start: 2023-11-22 | End: 2023-11-22

## 2023-11-22 RX ORDER — IPRATROPIUM/ALBUTEROL SULFATE 18-103MCG
3 AEROSOL WITH ADAPTER (GRAM) INHALATION EVERY 6 HOURS
Refills: 0 | Status: DISCONTINUED | OUTPATIENT
Start: 2023-11-22 | End: 2023-11-30

## 2023-11-22 RX ORDER — RANITIDINE HYDROCHLORIDE 150 MG/1
1 TABLET, FILM COATED ORAL
Qty: 0 | Refills: 0 | DISCHARGE

## 2023-11-22 RX ORDER — RIVAROXABAN 15 MG-20MG
20 KIT ORAL
Refills: 0 | Status: DISCONTINUED | OUTPATIENT
Start: 2023-11-22 | End: 2023-11-25

## 2023-11-22 RX ORDER — ZALEPLON 10 MG
5 CAPSULE ORAL AT BEDTIME
Refills: 0 | Status: DISCONTINUED | OUTPATIENT
Start: 2023-11-22 | End: 2023-11-29

## 2023-11-22 RX ORDER — VITAMIN E 100 UNIT
1 CAPSULE ORAL
Qty: 0 | Refills: 0 | DISCHARGE

## 2023-11-22 RX ORDER — ATORVASTATIN CALCIUM 80 MG/1
1 TABLET, FILM COATED ORAL
Qty: 0 | Refills: 0 | DISCHARGE

## 2023-11-22 RX ORDER — ZOLPIDEM TARTRATE 10 MG/1
1 TABLET ORAL
Qty: 0 | Refills: 0 | DISCHARGE

## 2023-11-22 RX ORDER — MAGNESIUM SULFATE 500 MG/ML
2 VIAL (ML) INJECTION ONCE
Refills: 0 | Status: COMPLETED | OUTPATIENT
Start: 2023-11-22 | End: 2023-11-22

## 2023-11-22 RX ORDER — GLUCAGON INJECTION, SOLUTION 0.5 MG/.1ML
1 INJECTION, SOLUTION SUBCUTANEOUS ONCE
Refills: 0 | Status: DISCONTINUED | OUTPATIENT
Start: 2023-11-22 | End: 2023-11-23

## 2023-11-22 RX ORDER — INSULIN LISPRO 100/ML
VIAL (ML) SUBCUTANEOUS
Refills: 0 | Status: DISCONTINUED | OUTPATIENT
Start: 2023-11-22 | End: 2023-11-23

## 2023-11-22 RX ORDER — PREGABALIN 225 MG/1
1000 CAPSULE ORAL DAILY
Refills: 0 | Status: DISCONTINUED | OUTPATIENT
Start: 2023-11-22 | End: 2023-11-30

## 2023-11-22 RX ORDER — ESOMEPRAZOLE MAGNESIUM 40 MG/1
1 CAPSULE, DELAYED RELEASE ORAL
Qty: 0 | Refills: 0 | DISCHARGE

## 2023-11-22 RX ORDER — FUROSEMIDE 40 MG
20 TABLET ORAL EVERY 12 HOURS
Refills: 0 | Status: DISCONTINUED | OUTPATIENT
Start: 2023-11-22 | End: 2023-11-22

## 2023-11-22 RX ORDER — CHOLECALCIFEROL (VITAMIN D3) 125 MCG
2000 CAPSULE ORAL DAILY
Refills: 0 | Status: DISCONTINUED | OUTPATIENT
Start: 2023-11-22 | End: 2023-11-30

## 2023-11-22 RX ORDER — MILK THISTLE 180 MG
1000 CAPSULE ORAL
Qty: 0 | Refills: 0 | DISCHARGE

## 2023-11-22 RX ADMIN — Medication 1 MILLIGRAM(S): at 20:50

## 2023-11-22 RX ADMIN — Medication 25 MILLIGRAM(S): at 19:32

## 2023-11-22 RX ADMIN — Medication 3 MILLILITER(S): at 19:15

## 2023-11-22 RX ADMIN — Medication 1 SPRAY(S): at 06:17

## 2023-11-22 RX ADMIN — Medication 2000 UNIT(S): at 11:45

## 2023-11-22 RX ADMIN — RIVAROXABAN 20 MILLIGRAM(S): KIT at 19:32

## 2023-11-22 RX ADMIN — PANTOPRAZOLE SODIUM 40 MILLIGRAM(S): 20 TABLET, DELAYED RELEASE ORAL at 05:41

## 2023-11-22 RX ADMIN — NYSTATIN CREAM 1 APPLICATION(S): 100000 CREAM TOPICAL at 19:17

## 2023-11-22 RX ADMIN — PREGABALIN 1000 MICROGRAM(S): 225 CAPSULE ORAL at 11:46

## 2023-11-22 RX ADMIN — Medication 3 MILLILITER(S): at 22:09

## 2023-11-22 RX ADMIN — Medication 1 MILLIGRAM(S): at 22:23

## 2023-11-22 RX ADMIN — Medication 25 GRAM(S): at 11:05

## 2023-11-22 RX ADMIN — ATORVASTATIN CALCIUM 40 MILLIGRAM(S): 80 TABLET, FILM COATED ORAL at 22:11

## 2023-11-22 RX ADMIN — Medication 20 MILLIGRAM(S): at 07:43

## 2023-11-22 NOTE — H&P ADULT - HISTORY OF PRESENT ILLNESS
Pt is 77yo F, poor historian, w/ PMHx of HTN, HLD, AFib (on Xarelto), chronic venous stasis, DM T2, neuropathy, Hx of uterine CA who presented to [facility] ED endorsing 2 days of worsening Pt is 77yo F, poor historian, w/ PMHx of HTN, HLD, AFib (on Xarelto), chronic venous stasis, DM T2, neuropathy, Hx of uterine CA who presented to [facility] ED endorsing 2 days of worsening SOB with exertion, now to the point that she can only walk a few steps. She reports a chronic SOB issue, but the severity has acutely worsened. Pt also reporting she has been having to sleep on the couch propped up for about 6 months (+ orthopnea). Pt was seen by pulmonologist Dr. 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 Pt denies palpitations, chest pain, dizziness, syncope, abdominal pain, N/V, fever/chills, cough. Patient found to have RVP (+) for entero/rhinovirus.     VITALS: HR 66, /54, SpO2 93% on RA -> 98% on 2L NC, T 97F.   LABS: WBC 7.94, Hgb/Hct 12.9/39.9, Plt Cnt 175, Na 133, K 4.7, BUN/Cr 20/1.05, HS Trop T 29 -> 27, BNP 7521.   RVP (+) for Enter/Rhinovirus.     Treatment in ED: Duoneb x1, Lasix 20mg IV x1.     Patient admitted to cardiology for further work up.

## 2023-11-22 NOTE — H&P ADULT - PROBLEM SELECTOR PLAN 5
- F/U A1c.   - Hold home Jardiance and CONT: mod ISS while inpatient.       DVT ppx: Xarelto  Dispo: pending workup

## 2023-11-22 NOTE — CHART NOTE - NSCHARTNOTEFT_GEN_A_CORE
PA called to bedside due to change in respiratory status. Patient with labored breathing while sleeping on arrival. Patient woken up and respiration improved and patient at baseline mental status and states she "feels fine" and doesn't feel short of breath. SpO2 76-85% on 6L via NC with good waveform. Lungs clears to auscultation. CXR ordered.   Pt w/ hx of restrictive lung disease 2/2 thoracic spine kyphosis.   Switched to Non-Rebreather Mask at 8LPM with oxygen 98%. Respiratory called and BiPAP at bedside for use if indicated. Will continue NRB at 8LPM and monitor oxygen closely.   CXR pending

## 2023-11-22 NOTE — PATIENT PROFILE ADULT - FALL HARM RISK - FACTORS
Frequent toileting needed/Impaired gait/Impaired vision/IV and/or equipment tethered to patient/Poor balance/Syncope/Weakness

## 2023-11-22 NOTE — PATIENT PROFILE ADULT - LEGAL HELP
Pt C/O 'chest tightness' x 3 days worsening this evening while lying in bed with sensation of heart palpitations.   Denies N/V/D, dizziness, HA.  VSS, pt alert x 4 and in no distress.     Triage Assessment     Row Name 05/05/23 0101       Triage Assessment (Adult)    Airway WDL WDL       Respiratory WDL    Respiratory WDL WDL       Skin Circulation/Temperature WDL    Skin Circulation/Temperature WDL WDL       Cardiac WDL    Cardiac WDL WDL       Peripheral/Neurovascular WDL    Peripheral Neurovascular WDL WDL    Capillary Refill, General less than/equal to 3 secs       Cognitive/Neuro/Behavioral WDL    Cognitive/Neuro/Behavioral WDL WDL       Claudia Coma Scale    Best Eye Response 4-->(E4) spontaneous    Best Motor Response 6-->(M6) obeys commands    Best Verbal Response 5-->(V5) oriented    Tiller Coma Scale Score 15              
no

## 2023-11-22 NOTE — CONSULT NOTE ADULT - ASSESSMENT
79yo F w/ PMHx of HTN, HLD, AFib (on Xarelto), chronic venous stasis, DM T2, neuropathy, Hx of uterine CA who presented to [facility] ED endorsing 2 days of worsening SOB with exertion Pulmonary consulted given history of restrictive lung disease.    Data review:  Outpatient PFTs-moderate restrictive defect, with mildly reduced DLCO  Outpatient pulm: Dr. Silvestre  Outpatient inhaler: anoro  Home oxygen: none      Here with progressive GREGORY, exam with JVD, cardiac murmur and LE edema, elevated proBNP with CXR with mild congestion. Incidentally COVID positive.  - Agree with diuresis per primary team  - can start on duonebs q6hr  - OOBTC and incentive spirometery  - Check echo 77yo F w/ PMHx of HTN, HLD, AFib (on Xarelto), chronic venous stasis, DM T2, neuropathy, Hx of uterine CA who presented to [facility] ED endorsing 2 days of worsening SOB with exertion Pulmonary consulted given history of restrictive lung disease.    Data review:  Outpatient PFTs-moderate restrictive defect, with mildly reduced DLCO  Outpatient pulm: Dr. Silvestre  Outpatient inhaler: anoro  Home oxygen: none      Here with progressive GREGORY, exam with JVD, cardiac murmur and LE edema, elevated proBNP with CXR with mild congestion. Incidentally entero/rhino virus positive.  - Agree with diuresis per primary team  - can start on duonebs q6hr  - OOBTC and incentive spirometery  - Check echo

## 2023-11-22 NOTE — CONSULT NOTE ADULT - ATTENDING COMMENTS
Dyspnea most likely related to cardiac/valvular disease.  Supportive care for viral URI with nebs.  Patient will benefit from cardiology input.

## 2023-11-22 NOTE — H&P ADULT - ASSESSMENT
Pt is 79yo F, poor historian, w/ PMHx of HTN, HLD, AFib (on Xarelto), chronic venous stasis, DM T2, neuropathy, Hx of uterine CA who presented to [facility] ED endorsing 2 days of worsening SOB with exertion, now to the point that she can only walk a few steps. She reports a chronic SOB issue, but the severity has acutely worsened. Pt also reporting she has been having to sleep on the couch propped up for about 6 months (+ orthopnea). Pt was seen by pulmonologist Dr. 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 Pt denies palpitations, chest pain, dizziness, syncope, abdominal pain, N/V, fever/chills, cough. Patient found to have RVP (+) for entero/rhinovirus.     VITALS: HR 66, /54, SpO2 93% on RA -> 98% on 2L NC, T 97F.   LABS: WBC 7.94, Hgb/Hct 12.9/39.9, Plt Cnt 175, Na 133, K 4.7, BUN/Cr 20/1.05, HS Trop T 29 -> 27, BNP 7521.   RVP (+) for Enter/Rhinovirus.     Treatment in ED: Duoneb x1, Lasix 20mg IV x1.     Patient admitted to cardiology for further work up.    77yo F, poor historian, w/ PMHx of HTN, HLD, AFib (on Xarelto), chronic venous stasis, DM T2, neuropathy, Hx of uterine CA who presented to [facility] ED endorsing 2 days of worsening SOB with minimal exertion. Also endorsing some orthopnea recently. BNP 7500 and patient given Lasix IV x1 in ED. RVP (+) for entero/rhinovirus. Admit for CHF work up and gentle IV diuresis. F/U TTE and NPO in case need for further diagnostic work up. Low suspicion for PE at this time (no tachycardic, no tachypnea at rest), but would f/u AM D-Dimer.

## 2023-11-22 NOTE — H&P ADULT - NSHPLABSRESULTS_GEN_ALL_CORE
12.9   7.94  )-----------( 175      ( 21 Nov 2023 18:34 )             39.9     133<L>  |  96  |  20  ----------------------------<  78  4.7   |  20<L>  |  1.05    Ca    9.6      21 Nov 2023 18:34    TPro  7.1  /  Alb  4.2  /  TBili  3.0<H>  /  DBili  x   /  AST  36  /  ALT  29  /  AlkPhos  89  11-21    PT/INR - ( 21 Nov 2023 18:34 )   PT: 32.8 sec;   INR: 2.97        PTT - ( 21 Nov 2023 18:34 )  PTT:37.1 sec    Urinalysis Basic - ( 21 Nov 2023 18:34 )    Color: x / Appearance: x / SG: x / pH: x  Gluc: 78 mg/dL / Ketone: x  / Bili: x / Urobili: x   Blood: x / Protein: x / Nitrite: x   Leuk Esterase: x / RBC: x / WBC x   Sq Epi: x / Non Sq Epi: x / Bacteria: x    EKG: NSR, TWI II/aVF/V4-V6.

## 2023-11-22 NOTE — CONSULT NOTE ADULT - SUBJECTIVE AND OBJECTIVE BOX
PULMONARY SERVICE INITIAL CONSULT NOTE    HPI:  Pt is 79yo F, poor historian, w/ PMHx of HTN, HLD, AFib (on Xarelto), chronic venous stasis, DM T2, neuropathy, Hx of uterine CA who presented to [facility] ED endorsing 2 days of worsening SOB with exertion, now to the point that she can only walk a few steps. She reports a chronic SOB issue, but the severity has acutely worsened. Pt also reporting she has been having to sleep on the couch propped up for about 6 months (+ orthopnea). Pt was seen by pulmonologist Dr. 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 Pt denies palpitations, chest pain, dizziness, syncope, abdominal pain, N/V, fever/chills, cough. Patient found to have RVP (+) for entero/rhinovirus.     VITALS: HR 66, /54, SpO2 93% on RA -> 98% on 2L NC, T 97F.   LABS: WBC 7.94, Hgb/Hct 12.9/39.9, Plt Cnt 175, Na 133, K 4.7, BUN/Cr 20/1.05, HS Trop T 29 -> 27, BNP 7521.   RVP (+) for Enter/Rhinovirus.     Treatment in ED: Duoneb x1, Lasix 20mg IV x1.     Patient admitted to cardiology for further work up.  (22 Nov 2023 00:12)      REVIEW OF SYSTEMS:  Constitutional: No fever, weight loss or fatigue  Eyes: No eye pain, visual disturbances, or discharge  ENMT:  No difficulty hearing, tinnitus, vertigo; No sinus or throat pain  Neck: No pain, stiffness or neck swelling  Respiratory: see HPI  Cardiovascular: No chest pain, palpitations, dizziness or leg swelling  Gastrointestinal: No abdominal or epigastric pain. No nausea, vomiting or hematemesis; No diarrhea or constipation. No melena or hematochezia.  Genitourinary: No dysuria, frequency, hematuria or incontinence  Neurological: No headaches, memory loss, loss of strength, numbness or tremors  Skin: No itching, burning, rashes or lesions   Lymph Nodes: No enlarged glands  Endocrine: No heat or cold intolerance; No hair loss  Musculoskeletal: No joint pain or swelling; No muscle, back or extremity pain  Psychiatric: No depression, anxiety, mood swings or difficulty sleeping  Heme/Lymph: No easy bruising or bleeding gums  Allergy and Immunologic: No hives or eczema    PAST MEDICAL & SURGICAL HISTORY:  Atrial fibrillation      Peripheral neuropathy      HTN (hypertension)      HLD (hyperlipidemia)      HARRIS (nonalcoholic steatohepatitis)      Neuropathy      Kerr's esophagus      Uterine cancer      DM (diabetes mellitus)      History of cholecystectomy      H/O total hysterectomy      H/O shoulder surgery          FAMILY HISTORY:  No pertinent family history in first degree relatives        SOCIAL HISTORY:  Smoking Status: [ ] Current, [ ] Former, [x ] Never  Pack Years:    MEDICATIONS:  Pulmonary:    Antimicrobials:    Anticoagulants:  rivaroxaban 20 milliGRAM(s) Oral with dinner    Onc:    GI/:  pantoprazole    Tablet 40 milliGRAM(s) Oral before breakfast    Endocrine:  atorvastatin 40 milliGRAM(s) Oral at bedtime  dextrose 50% Injectable 12.5 Gram(s) IV Push once  dextrose 50% Injectable 25 Gram(s) IV Push once  dextrose 50% Injectable 25 Gram(s) IV Push once  dextrose Oral Gel 15 Gram(s) Oral once PRN  glucagon  Injectable 1 milliGRAM(s) IntraMuscular once  insulin lispro (ADMELOG) corrective regimen sliding scale   SubCutaneous Before meals and at bedtime    Cardiac:  furosemide   Injectable 20 milliGRAM(s) IV Push every 12 hours    Other Medications:  cholecalciferol 2000 Unit(s) Oral daily  cyanocobalamin 1000 MICROGram(s) Oral daily  dextrose 5%. 1000 milliLiter(s) IV Continuous <Continuous>  dextrose 5%. 1000 milliLiter(s) IV Continuous <Continuous>  LORazepam   Injectable 1 milliGRAM(s) IV Push every 2 hours PRN  sodium chloride 0.65% Nasal 1 Spray(s) Both Nostrils every 3 hours PRN  zaleplon 5 milliGRAM(s) Oral at bedtime PRN      Allergies    No Known Allergies    Intolerances        Vital Signs Last 24 Hrs  T(C): 36.3 (22 Nov 2023 13:09), Max: 37.1 (22 Nov 2023 00:09)  T(F): 97.4 (22 Nov 2023 13:09), Max: 98.8 (22 Nov 2023 05:26)  HR: 65 (22 Nov 2023 13:09) (62 - 72)  BP: 124/64 (22 Nov 2023 13:09) (116/71 - 153/78)  BP(mean): 86 (22 Nov 2023 10:22) (86 - 86)  RR: 17 (22 Nov 2023 13:09) (17 - 22)  SpO2: 96% (22 Nov 2023 13:09) (93% - 100%)    Parameters below as of 22 Nov 2023 13:09  Patient On (Oxygen Delivery Method): nasal cannula  O2 Flow (L/min): 2      11-21 @ 07:01  -  11-22 @ 07:00  --------------------------------------------------------  IN: 100 mL / OUT: 1000 mL / NET: -900 mL    11-22 @ 07:01  -  11-22 @ 14:37  --------------------------------------------------------  IN: 0 mL / OUT: 1200 mL / NET: -1200 mL          PHYSICAL EXAM:  Constitutional: NAD  Head: NC/AT  EENT: MMM  Neck: JVD  Respiratory: faint crackles  Cardiovascular: +S1/S2, +Murmur  Gastrointestinal: NTND  Extremities: WWP; +Edema  Vascular: 2+ radial pulses B/L  Neurological: awake and alert; ROGER    LABS:      CBC Full  -  ( 22 Nov 2023 05:30 )  WBC Count : 7.28 K/uL  RBC Count : 4.17 M/uL  Hemoglobin : 12.7 g/dL  Hematocrit : 40.5 %  Platelet Count - Automated : 169 K/uL  Mean Cell Volume : 97.1 fl  Mean Cell Hemoglobin : 30.5 pg  Mean Cell Hemoglobin Concentration : 31.4 gm/dL  Auto Neutrophil # : x  Auto Lymphocyte # : x  Auto Monocyte # : x  Auto Eosinophil # : x  Auto Basophil # : x  Auto Neutrophil % : x  Auto Lymphocyte % : x  Auto Monocyte % : x  Auto Eosinophil % : x  Auto Basophil % : x    11-22    137  |  98  |  20  ----------------------------<  97  4.1   |  21<L>  |  1.12    Ca    9.7      22 Nov 2023 05:30  Mg     1.8     11-22    TPro  6.7  /  Alb  3.9  /  TBili  2.7<H>  /  DBili  1.1<H>  /  AST  22  /  ALT  22  /  AlkPhos  82  11-22    PT/INR - ( 21 Nov 2023 18:34 )   PT: 32.8 sec;   INR: 2.97          PTT - ( 21 Nov 2023 18:34 )  PTT:37.1 sec      Urinalysis Basic - ( 22 Nov 2023 05:30 )    Color: x / Appearance: x / SG: x / pH: x  Gluc: 97 mg/dL / Ketone: x  / Bili: x / Urobili: x   Blood: x / Protein: x / Nitrite: x   Leuk Esterase: x / RBC: x / WBC x   Sq Epi: x / Non Sq Epi: x / Bacteria: x                RADIOLOGY & ADDITIONAL STUDIES:

## 2023-11-22 NOTE — H&P ADULT - NSICDXPASTMEDICALHX_GEN_ALL_CORE_FT
PAST MEDICAL HISTORY:  Atrial fibrillation     Kerr's esophagus     DM (diabetes mellitus)     HLD (hyperlipidemia)     HTN (hypertension)     HARRIS (nonalcoholic steatohepatitis)     Neuropathy     Peripheral neuropathy     Uterine cancer

## 2023-11-22 NOTE — H&P ADULT - NSICDXPASTSURGICALHX_GEN_ALL_CORE_FT
PAST SURGICAL HISTORY:  H/O shoulder surgery     H/O total hysterectomy     History of cholecystectomy

## 2023-11-22 NOTE — PATIENT PROFILE ADULT - HOME ACCESSIBILITY CONCERNS
Please have her hold warfarin 4 days before procedure  Have pt restart day after procedure  Do stat inr day of the procedure  Do inr 4 days after procedure   none

## 2023-11-22 NOTE — H&P ADULT - NSHPADDITIONALINFOADULT_GEN_ALL_CORE
Attending Attestation:  I was physically present for the key portions of the evaluation and management (E/M) service provided.  I agree with the above history, physical, and plan which I have reviewed with the following edits/addendum:    Reviewed TTE images and noted #LV intracavitary gradient due to #hyperdynamic systolic function with #moderate AS. Discussed case with structural heart/IC Dr Woods. Will optimize hemodynamics with reducing contractility with beta blockade. Stop IV diuresis. Will reassess valvular and LV function on Fri. Leg venous stasis skin changes and ulcers appear non-infected. Pt lying flat and clinically euvolemic.    Michael Barbour MD  Cardiology Attending Attestation:  I was physically present for the key portions of the evaluation and management (E/M) service provided.  I agree with the above history, physical, and plan which I have reviewed with the following edits/addendum:    Reviewed TTE images and noted #LV intracavitary gradient due to #hyperdynamic systolic function with #moderate AS. Discussed case with structural heart/IC Dr Woods. Will optimize hemodynamics by reducing contractility with beta blockade. Stop IV diuresis. Will reassess valvular and LV function on Fri. Leg venous stasis skin changes and ulcers appear non-infected. Pt lying flat and clinically euvolemic.    Michael Barbour MD  Cardiology

## 2023-11-22 NOTE — H&P ADULT - PATIENT'S GENDER IDENTITY
Royce Nick            MRN: [id number]            FIN: [id number]              Age:   27 years     Sex:  FEMALE     :  83   Associated Diagnoses:   None   Author:   CHUCKIE PARKINSON     Basic Information   History source: Patient. History of Present Illness   S: Pt doing well. Pain controlled. Tolerating general diet. + flatus, no BM. Lochia minimal. Voiding freely. Denies F/C, CP, SOB, N/V, dysuria, calf tenderness. Ambulating without difficulty. Pressure dressing removed, several steristrips applied to right side of incision . B+/RI. Physical Examination              Vital Signs   Vital Sign   19 05:54 CDT Temperature - VS 36.0 deg_C  Normal    Temperature Source - VS Temporal    Heart/Pulse Rate 74  Normal    Pulse Source Monitor    Respiration Rate 16 breaths/min  Normal    NIBP Systolic 96  Normal    NIBP Diastolic 59  LOW    NIBP Source Right Arm    NIBP MAP 71  Normal   .   General:  Alert, no acute distress. Skin:  Warm, dry. Cardiovascular:  Normal peripheral perfusion. Respiratory:  Respirations are non-labored. Gastrointestinal:  Soft, Non distended, Fundus: firm, below umbilicus, Inc: C/D/I with small area of skin separation of skin on right side of incsion (staples in place), Appropriately tender to palpation. Musculoskeletal:  No tenderness. Psychiatric:  Appropriate mood & affect. Medical Decision Making   Rationale:    A/P: POD #2  s/p primary C/S for breech presentation, doing well . Patient would like to be discharged to home today.  - GI: Tolerating gen diet   - : Voiding freely, voiding trial passed after chacon was reinserted on POD#1  - Pain: controlled   - Heme: antepartum Hgb 13.0 > pp Hgb 9.9   - ID: VSS, afebrile   - DVT ppx: encourage ambulation   - Continue routine postpartum/post op care  JEREMIAS Finn. Home today. F-U 6 wks. Will try Fe OTC. Female

## 2023-11-22 NOTE — PATIENT PROFILE ADULT - FALL HARM RISK - HARM RISK INTERVENTIONS
Assistance with ambulation/Assistance OOB with selected safe patient handling equipment/Communicate Risk of Fall with Harm to all staff/Discuss with provider need for PT consult/Monitor for mental status changes/Monitor gait and stability/Provide patient with walking aids - walker, cane, crutches/Reinforce activity limits and safety measures with patient and family/Review medications for side effects contributing to fall risk/Sit up slowly, dangle for a short time, stand at bedside before walking/Tailored Fall Risk Interventions/Toileting schedule using arm’s reach rule for commode and bathroom/Visual Cue: Yellow wristband and red socks/Bed in lowest position, wheels locked, appropriate side rails in place/Call bell, personal items and telephone in reach/Instruct patient to call for assistance before getting out of bed or chair/Non-slip footwear when patient is out of bed/[place] to call system/Physically safe environment - no spills, clutter or unnecessary equipment/Purposeful Proactive Rounding/Room/bathroom lighting operational, light cord in reach

## 2023-11-22 NOTE — H&P ADULT - PROBLEM SELECTOR PLAN 1
- SOB with minimal exertion acutely worsening over past 2 days.   - SpO2 93% on RA; pt visibly short of breath while walking.   - Evaluated by Pulmonologist 4/2023 showing mild restrictive disease due to spinal kyphosis.   - HS Trop T 29 -> 27.   - BNP 7521.   - RVP (+) as below.   - PLAN: TTE for structural eval to eval for CHF; c/w Lasix 20mg IV BID - likely only needs 1-2 days of diuresis as not overtly overloaded. F/U D-Dimer in AM (low suspicion for PE at this time). NPO in case need for imaging studies in AM.       ## ENTERO / RHINOVIRUS  - Duonebs q6hrs PRN.   - Consider Medicine consult for comanagement.

## 2023-11-23 DIAGNOSIS — F10.10 ALCOHOL ABUSE, UNCOMPLICATED: ICD-10-CM

## 2023-11-23 DIAGNOSIS — I38 ENDOCARDITIS, VALVE UNSPECIFIED: ICD-10-CM

## 2023-11-23 DIAGNOSIS — R73.03 PREDIABETES: ICD-10-CM

## 2023-11-23 DIAGNOSIS — I27.20 PULMONARY HYPERTENSION, UNSPECIFIED: ICD-10-CM

## 2023-11-23 LAB
ANION GAP SERPL CALC-SCNC: 12 MMOL/L — SIGNIFICANT CHANGE UP (ref 5–17)
ANION GAP SERPL CALC-SCNC: 12 MMOL/L — SIGNIFICANT CHANGE UP (ref 5–17)
BUN SERPL-MCNC: 21 MG/DL — SIGNIFICANT CHANGE UP (ref 7–23)
BUN SERPL-MCNC: 21 MG/DL — SIGNIFICANT CHANGE UP (ref 7–23)
CALCIUM SERPL-MCNC: 9.3 MG/DL — SIGNIFICANT CHANGE UP (ref 8.4–10.5)
CALCIUM SERPL-MCNC: 9.3 MG/DL — SIGNIFICANT CHANGE UP (ref 8.4–10.5)
CHLORIDE SERPL-SCNC: 101 MMOL/L — SIGNIFICANT CHANGE UP (ref 96–108)
CHLORIDE SERPL-SCNC: 101 MMOL/L — SIGNIFICANT CHANGE UP (ref 96–108)
CO2 SERPL-SCNC: 26 MMOL/L — SIGNIFICANT CHANGE UP (ref 22–31)
CO2 SERPL-SCNC: 26 MMOL/L — SIGNIFICANT CHANGE UP (ref 22–31)
CREAT SERPL-MCNC: 1.14 MG/DL — SIGNIFICANT CHANGE UP (ref 0.5–1.3)
CREAT SERPL-MCNC: 1.14 MG/DL — SIGNIFICANT CHANGE UP (ref 0.5–1.3)
EGFR: 49 ML/MIN/1.73M2 — LOW
EGFR: 49 ML/MIN/1.73M2 — LOW
GLUCOSE BLDC GLUCOMTR-MCNC: 108 MG/DL — HIGH (ref 70–99)
GLUCOSE BLDC GLUCOMTR-MCNC: 108 MG/DL — HIGH (ref 70–99)
GLUCOSE BLDC GLUCOMTR-MCNC: 109 MG/DL — HIGH (ref 70–99)
GLUCOSE BLDC GLUCOMTR-MCNC: 109 MG/DL — HIGH (ref 70–99)
GLUCOSE BLDC GLUCOMTR-MCNC: 113 MG/DL — HIGH (ref 70–99)
GLUCOSE BLDC GLUCOMTR-MCNC: 113 MG/DL — HIGH (ref 70–99)
GLUCOSE BLDC GLUCOMTR-MCNC: 130 MG/DL — HIGH (ref 70–99)
GLUCOSE BLDC GLUCOMTR-MCNC: 130 MG/DL — HIGH (ref 70–99)
GLUCOSE SERPL-MCNC: 119 MG/DL — HIGH (ref 70–99)
GLUCOSE SERPL-MCNC: 119 MG/DL — HIGH (ref 70–99)
HCT VFR BLD CALC: 41.3 % — SIGNIFICANT CHANGE UP (ref 34.5–45)
HCT VFR BLD CALC: 41.3 % — SIGNIFICANT CHANGE UP (ref 34.5–45)
HGB BLD-MCNC: 12.8 G/DL — SIGNIFICANT CHANGE UP (ref 11.5–15.5)
HGB BLD-MCNC: 12.8 G/DL — SIGNIFICANT CHANGE UP (ref 11.5–15.5)
MAGNESIUM SERPL-MCNC: 2 MG/DL — SIGNIFICANT CHANGE UP (ref 1.6–2.6)
MAGNESIUM SERPL-MCNC: 2 MG/DL — SIGNIFICANT CHANGE UP (ref 1.6–2.6)
MCHC RBC-ENTMCNC: 30.8 PG — SIGNIFICANT CHANGE UP (ref 27–34)
MCHC RBC-ENTMCNC: 30.8 PG — SIGNIFICANT CHANGE UP (ref 27–34)
MCHC RBC-ENTMCNC: 31 GM/DL — LOW (ref 32–36)
MCHC RBC-ENTMCNC: 31 GM/DL — LOW (ref 32–36)
MCV RBC AUTO: 99.3 FL — SIGNIFICANT CHANGE UP (ref 80–100)
MCV RBC AUTO: 99.3 FL — SIGNIFICANT CHANGE UP (ref 80–100)
NRBC # BLD: 0 /100 WBCS — SIGNIFICANT CHANGE UP (ref 0–0)
NRBC # BLD: 0 /100 WBCS — SIGNIFICANT CHANGE UP (ref 0–0)
PLATELET # BLD AUTO: 157 K/UL — SIGNIFICANT CHANGE UP (ref 150–400)
PLATELET # BLD AUTO: 157 K/UL — SIGNIFICANT CHANGE UP (ref 150–400)
POTASSIUM SERPL-MCNC: 4 MMOL/L — SIGNIFICANT CHANGE UP (ref 3.5–5.3)
POTASSIUM SERPL-MCNC: 4 MMOL/L — SIGNIFICANT CHANGE UP (ref 3.5–5.3)
POTASSIUM SERPL-SCNC: 4 MMOL/L — SIGNIFICANT CHANGE UP (ref 3.5–5.3)
POTASSIUM SERPL-SCNC: 4 MMOL/L — SIGNIFICANT CHANGE UP (ref 3.5–5.3)
RBC # BLD: 4.16 M/UL — SIGNIFICANT CHANGE UP (ref 3.8–5.2)
RBC # BLD: 4.16 M/UL — SIGNIFICANT CHANGE UP (ref 3.8–5.2)
RBC # FLD: 20.7 % — HIGH (ref 10.3–14.5)
RBC # FLD: 20.7 % — HIGH (ref 10.3–14.5)
SODIUM SERPL-SCNC: 139 MMOL/L — SIGNIFICANT CHANGE UP (ref 135–145)
SODIUM SERPL-SCNC: 139 MMOL/L — SIGNIFICANT CHANGE UP (ref 135–145)
WBC # BLD: 6.73 K/UL — SIGNIFICANT CHANGE UP (ref 3.8–10.5)
WBC # BLD: 6.73 K/UL — SIGNIFICANT CHANGE UP (ref 3.8–10.5)
WBC # FLD AUTO: 6.73 K/UL — SIGNIFICANT CHANGE UP (ref 3.8–10.5)
WBC # FLD AUTO: 6.73 K/UL — SIGNIFICANT CHANGE UP (ref 3.8–10.5)

## 2023-11-23 PROCEDURE — 99232 SBSQ HOSP IP/OBS MODERATE 35: CPT | Mod: GC

## 2023-11-23 PROCEDURE — 99233 SBSQ HOSP IP/OBS HIGH 50: CPT

## 2023-11-23 PROCEDURE — 71045 X-RAY EXAM CHEST 1 VIEW: CPT | Mod: 26

## 2023-11-23 PROCEDURE — 99223 1ST HOSP IP/OBS HIGH 75: CPT

## 2023-11-23 RX ORDER — METOPROLOL TARTRATE 50 MG
50 TABLET ORAL
Refills: 0 | Status: DISCONTINUED | OUTPATIENT
Start: 2023-11-23 | End: 2023-11-24

## 2023-11-23 RX ORDER — THIAMINE MONONITRATE (VIT B1) 100 MG
100 TABLET ORAL DAILY
Refills: 0 | Status: COMPLETED | OUTPATIENT
Start: 2023-11-23 | End: 2023-11-25

## 2023-11-23 RX ORDER — ZALEPLON 10 MG
5 CAPSULE ORAL ONCE
Refills: 0 | Status: DISCONTINUED | OUTPATIENT
Start: 2023-11-23 | End: 2023-11-24

## 2023-11-23 RX ORDER — FOLIC ACID 0.8 MG
1 TABLET ORAL DAILY
Refills: 0 | Status: DISCONTINUED | OUTPATIENT
Start: 2023-11-23 | End: 2023-11-30

## 2023-11-23 RX ADMIN — Medication 25 MILLIGRAM(S): at 06:42

## 2023-11-23 RX ADMIN — Medication 50 MILLIGRAM(S): at 17:26

## 2023-11-23 RX ADMIN — Medication 3 MILLILITER(S): at 21:42

## 2023-11-23 RX ADMIN — Medication 3 MILLILITER(S): at 17:26

## 2023-11-23 RX ADMIN — NYSTATIN CREAM 1 APPLICATION(S): 100000 CREAM TOPICAL at 06:43

## 2023-11-23 RX ADMIN — PREGABALIN 1000 MICROGRAM(S): 225 CAPSULE ORAL at 12:35

## 2023-11-23 RX ADMIN — ATORVASTATIN CALCIUM 40 MILLIGRAM(S): 80 TABLET, FILM COATED ORAL at 21:42

## 2023-11-23 RX ADMIN — Medication 3 MILLILITER(S): at 12:35

## 2023-11-23 RX ADMIN — Medication 100 MILLIGRAM(S): at 12:36

## 2023-11-23 RX ADMIN — RIVAROXABAN 20 MILLIGRAM(S): KIT at 17:26

## 2023-11-23 RX ADMIN — Medication 5 MILLIGRAM(S): at 21:42

## 2023-11-23 RX ADMIN — Medication 1 MILLIGRAM(S): at 12:36

## 2023-11-23 RX ADMIN — Medication 3 MILLILITER(S): at 06:41

## 2023-11-23 RX ADMIN — Medication 2000 UNIT(S): at 12:37

## 2023-11-23 RX ADMIN — PANTOPRAZOLE SODIUM 40 MILLIGRAM(S): 20 TABLET, DELAYED RELEASE ORAL at 06:42

## 2023-11-23 RX ADMIN — NYSTATIN CREAM 1 APPLICATION(S): 100000 CREAM TOPICAL at 17:29

## 2023-11-23 RX ADMIN — Medication 1 TABLET(S): at 12:36

## 2023-11-23 NOTE — PROGRESS NOTE ADULT - PROBLEM SELECTOR PLAN 5
- A1c 5.7%. Prediabetic;  Hold home Jardiance inpt.      ###bilateral lower extremity venous stasis wounds  (B/L lower extremities, R inner thigh, Back)  - previously admitted for cellulitis   [ ] Wound care consulted, await/appreciate recs    N: DASH/TLC diet   E: Replete lytes PRN   P: DVT PPX: Xarelto  C: FULL CODE   Dispo: pending workup;  PT consult  Case discussed with Dr. Barbour, Medicine and Pulmonology. Currently in NSR. C/w Xarelto 20mg PO QHS.    ##HTN (Hypertension)  - -140s.   - Home Nebivolol 10mg PO QD not on formulary. Uptitrating Lopressor as above.    ##HLD (Hyperlipidemia)  - Lipid panel well controlled, c/w Atorvastatin 40mg PO QHS.

## 2023-11-23 NOTE — CONSULT NOTE ADULT - ASSESSMENT
78F with PMH of atrial fibrillation on xarelto, HTN, HLD, DM2 with neuropathy and history of uterine cancer presenting with exertional dyspnea and admitted to the cardiology service for further workup.     #Hypoxia  #Dyspnea  #Acute hypoxic respiratory failure  TTE 11/22/23 w/ Hyperdynamic LVSF, EF >75%,  w/ cavity obliteration resulting in an intra-cavitary gradient of 66.00 mmHg w/ Valsalva maneuver; mild LVH; Dilated RV. MARISOL. Mod AS/TR. Mild MS/MR.  PulmHTN PASP 116 mmHg.  - received IV lasix, no longer on diuretics  - enterovirus positive, potential cause for persistent hypoxia  - appreciate input from pulmonology:   Outpatient PFTs-moderate restrictive defect, with mildly reduced DLCO  - concern for precapillary pulmonary hypertension, and will need outpatient pulm follow up  - wean oxygen as able    #HTN  #HLD  - plan per primary team    #Alcohol withdrawal  - continue CIWA monitor  - if concern for further respiratory depression, can keep any withdrawal treatment as PRN  - if want standing regimen, consider librium 25 TID today, 25 BID tomorrow and 25 daily the following day to complete taper  - continue thiamine, multivitamin and folic acid    #Atrial fibrillation  - continue xarelto, metoprolol

## 2023-11-23 NOTE — PROGRESS NOTE ADULT - PROBLEM SELECTOR PLAN 1
- SOB with minimal exertion acutely worsening over past 2 days.   - P/w SpO2 93% on RA; pt visibly short of breath while walking--> New increased O2 requirements ovn to 15L NRB now down to 6L NC   - Evaluated by Pulmonologist 4/2023 showing mild restrictive disease due to spinal kyphosis.  Pulm consulted inpt- Pulm recs w/u cardiac dz.    - HS Trop T 29 -> 27.  BNP 7521.  DDimer WNL. RVP (+) as below.   - S/p Lasix 20mg IVP qd x2, now held as appears euvolemic/dry to exam.   - TTE 11/22/23 w/ Hyperdynamic LVSF, EF >75%,  w/ cavity obliteration resulting in an intra-cavitary gradient of 66.00 mmHg w/ Valsalva maneuver; mild LVH; Dilated RV. MARISOL. Mod AS/TR. Mild MS/MR.  PulmHTN PASP 116 mmHg.  - PLAN: Started Lopressor 25mg BID 11/22, uptitrated to 50mg Lopresser BID 11/23;  Plan for rpt TTE w/ BB on board 11/24/23.    ## ENTERO / RHINOVIRUS  - Duonebs q6hrs PRN. OOBTC and incentive spirometery.   - Medicine consulted for comanagement. - SOB with minimal exertion acutely worsening over past 2 days.   - P/w SpO2 93% on RA; pt visibly short of breath while walking--> New increased O2 requirements ovn to 15L NRB now down to 6L NC   - Evaluated by Pulmonologist 4/2023 showing mild restrictive disease due to spinal kyphosis.  Pulm consulted inpt   - HS Trop T 29 -> 27.  BNP 7521.  DDimer WNL. RVP (+) as below.   - S/p Lasix 20mg IVP qd x2, now held as appears euvolemic/dry to exam.   - TTE 11/22/23 w/ Hyperdynamic LVSF, EF >75%,  w/ cavity obliteration resulting in an intra-cavitary gradient of 66.00 mmHg w/ Valsalva maneuver; mild LVH; Dilated RV. MARISOL. Mod AS/TR. Mild MS/MR.  PulmHTN PASP 116 mmHg.  - PLAN: Started Lopressor 25mg BID 11/22, uptitrated to 50mg Lopresser BID 11/23;  Plan for rpt TTE w/ BB on board 11/24/23.    ## ENTERO / RHINOVIRUS  - Duonebs q6hrs PRN. OOBTC and incentive spirometery.   - Medicine consulted for comanagement.

## 2023-11-23 NOTE — PROGRESS NOTE ADULT - PROBLEM SELECTOR PLAN 3
CIWA monitoring ordered for 2 glasses of vodka per day  - reports last drink 11/20/23  - s/p 2x 1mg IVP Ativan overnight 11/22 for CIWA >8  - D/w Hospitalist consult  who recommends Librium taper 25mg q8h x3 doses, then 25mg BID x2 doses, then 25mg Qdx 1, then off.  As d/w Attending will initiate this regimen.  - SBIRT LANI consulted -TTE as above. CTSx consulted 11/22, [] await/appreciate recs.

## 2023-11-23 NOTE — CONSULT NOTE ADULT - SUBJECTIVE AND OBJECTIVE BOX
See below for cardiology HPI:  "Pt is 77yo F, poor historian, w/ PMHx of HTN, HLD, AFib (on Xarelto), chronic venous stasis, DM T2, neuropathy, Hx of uterine CA who presented to [facility] ED endorsing 2 days of worsening SOB with exertion, now to the point that she can only walk a few steps. She reports a chronic SOB issue, but the severity has acutely worsened. Pt also reporting she has been having to sleep on the couch propped up for about 6 months (+ orthopnea). Pt was seen by pulmonologist Dr. 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 Pt denies palpitations, chest pain, dizziness, syncope, abdominal pain, N/V, fever/chills, cough. Patient found to have RVP (+) for entero/rhinovirus.     VITALS: HR 66, /54, SpO2 93% on RA -> 98% on 2L NC, T 97F.   LABS: WBC 7.94, Hgb/Hct 12.9/39.9, Plt Cnt 175, Na 133, K 4.7, BUN/Cr 20/1.05, HS Trop T 29 -> 27, BNP 7521.   RVP (+) for Enter/Rhinovirus.     Treatment in ED: Duoneb x1, Lasix 20mg IV x1.     Patient admitted to cardiology for further work up.       Review of Systems:  · Additional ROS	12 point ROS negative other than stated in HPI      Allergies and Intolerances:        Allergies:  	No Known Allergies:     Home Medications:   * Patient Currently Takes Medications as of 22-Nov-2023 02:20 documented in Structured Notes  · 	omeprazole 20 mg oral delayed release capsule: 1 cap(s) orally once a day  · 	Vitamin B-12 1000 mcg oral tablet: Last Dose Taken:  , 1 tab(s) orally once a day  · 	Vitamin D3 2000 intl units oral tablet: Last Dose Taken:  , 1 tab(s) orally once a day  · 	atorvastatin 40 mg oral tablet: Last Dose Taken:  , 1 tab(s) orally once a day (at bedtime)  · 	nebivolol 10 mg oral tablet: 1 tab(s) orally once a day  · 	Lunesta 3 mg oral tablet: 1 tab(s) orally once a day (at bedtime)  · 	Xarelto 20 mg oral tablet: Last Dose Taken:  , 1 tab(s) orally once a day (in the evening)  · 	Jardiance 10 mg oral tablet: 1 tab(s) orally    Patient History:   Past Medical, Past Surgical, and Family History:  PAST MEDICAL HISTORY:  Atrial fibrillation     Kerr's esophagus     DM (diabetes mellitus)     HLD (hyperlipidemia)     HTN (hypertension)     HARRIS (nonalcoholic steatohepatitis)     Neuropathy     Peripheral neuropathy     Uterine cancer.     PAST SURGICAL HISTORY:  H/O shoulder surgery     H/O total hysterectomy     History of cholecystectomy.     FAMILY HISTORY:  No pertinent family history in first degree relatives. No pertinent family history of: None reported.    Social History:  · Substance use	No  · Social History (marital status, living situation, occupation, and sexual history)	Quit 15yrs ago  "    Vital Signs Last 24 Hrs  T(C): 36.4 (23 Nov 2023 20:16), Max: 36.8 (23 Nov 2023 05:00)  T(F): 97.6 (23 Nov 2023 20:16), Max: 98.2 (23 Nov 2023 05:00)  HR: 67 (23 Nov 2023 20:16) (62 - 67)  BP: 143/68 (23 Nov 2023 20:16) (109/69 - 148/84)  BP(mean): 93 (23 Nov 2023 20:16) (92 - 110)  RR: 18 (23 Nov 2023 20:16) (16 - 18)  SpO2: 97% (23 Nov 2023 20:16) (75% - 100%)    Parameters below as of 23 Nov 2023 20:16  Patient On (Oxygen Delivery Method): nasal cannula  O2 Flow (L/min): 4    Physical exam  General: no acute distress, wearing nasal cannula, sitting up in bed eating lunch  HEENT: normocephalic, atraumatic, MMM  Cards: irr irr, systolic murmur  Pulm: slight bibasilar crackles, no wheeze  Ab: soft, nontender, nondistended, normoactive bowel sounds  Ext: hyperpigmentation of bilateral lower extremities, no lower extremity edema, warm and well perfused  Neuro: speech is fluent, no facial asymmetry, follows commands  Skin: hyperpigmentation of bilateral lower extremities, otherwise no obvious rashes or lesions

## 2023-11-23 NOTE — DIETITIAN INITIAL EVALUATION ADULT - PERTINENT LABORATORY DATA
11-23    139  |  101  |  21  ----------------------------<  119<H>  4.0   |  26  |  1.14    Ca    9.3      23 Nov 2023 06:20  Mg     2.0     11-23    TPro  6.6  /  Alb  3.7  /  TBili  2.0<H>  /  DBili  0.8<H>  /  AST  25  /  ALT  23  /  AlkPhos  82  11-22  POCT Blood Glucose.: 130 mg/dL (11-23-23 @ 11:52)  A1C with Estimated Average Glucose Result: 5.7 % (11-22-23 @ 05:30)

## 2023-11-23 NOTE — DIETITIAN INITIAL EVALUATION ADULT - NSFNSPHYEXAMSKINFT_GEN_A_CORE
Pressure Injury 1: none, Stage II  Pressure Injury 2: none, none  Pressure Injury 3: none, none  Pressure Injury 4: none, none  Pressure Injury 5: none, none  Pressure Injury 6: none, none  Pressure Injury 7: none, none  Pressure Injury 8: none, none  Pressure Injury 9: none, none  Pressure Injury 10: none, none  Pressure Injury 11: none, none

## 2023-11-23 NOTE — DIETITIAN INITIAL EVALUATION ADULT - PERSON TAUGHT/METHOD
Reviewed general healthful diet, adequate protein/fluid intake for wound healing/verbal instruction/patient instructed

## 2023-11-23 NOTE — CONSULT NOTE ADULT - NS ATTEST RISK PROBLEM GEN_ALL_CORE FT
acute hypoxic respiratory failure requiring oxygen, and significant symptoms, review of labs, trending of labs, discussion of plan with the cardiology team, monitoring for alcohol withdrawal, and review of outpatient records.

## 2023-11-23 NOTE — PROGRESS NOTE ADULT - ASSESSMENT
77yo F w/ PMHx of HTN, HLD, AFib (on Xarelto), chronic venous stasis, DM T2, neuropathy, Hx of uterine CA who presented to [facility] ED endorsing 2 days of worsening SOB with exertion Pulmonary consulted given history of restrictive lung disease.    Data review:  Outpatient PFTs-moderate restrictive defect, with mildly reduced DLCO  Outpatient pulm: Dr. Slivestre  Outpatient inhaler: anoro  Home oxygen: none    Here with progressive GREGORY, exam with JVD, cardiac murmur and LE edema, elevated proBNP with CXR with mild congestion. Incidentally entero/rhino virus positive.  Notable echocardigoram showed indicators of pulmonary hypertension, with PASP 116. 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 Assessment for a pre-capillary component can be pursued in the outpatient setting with Dr. Silvestre, no indication for inpatient cath, vasodilator response testing at this time.     - Consider cardiology input for left heart disease optimization  - outpatient follow up for assessment for pre-capillary PH  - continue with diuresis as tolerated  - Duonebs q6hr prn  - OOBTC and incentive spirometery   77yo F w/ PMHx of HTN, HLD, AFib (on Xarelto), chronic venous stasis, DM T2, neuropathy, Hx of uterine CA who presented to [facility] ED endorsing 2 days of worsening SOB with exertion Pulmonary consulted given history of restrictive lung disease.    Data review:  Outpatient PFTs-moderate restrictive defect, with mildly reduced DLCO  Outpatient pulm: Dr. Silvestre  Outpatient inhaler: anoro  Home oxygen: none    Here with progressive GREGORY, exam with JVD, cardiac murmur and LE edema, elevated proBNP with CXR with mild congestion. Incidentally entero/rhino virus positive.  Notable echocardigoram showed indicators of pulmonary hypertension, with PASP 116. 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 Assessment for a pre-capillary component can be pursued in the outpatient setting with Dr. Silvestre, no indication for inpatient cath, vasodilator response testing at this time.     - Consider cardiology input for left heart disease optimization  - outpatient follow up for assessment for possible pre-capillary PH  - continue with diuresis as tolerated  - Duonebs q6hr prn  - OOBTC and incentive spirometery

## 2023-11-23 NOTE — DIETITIAN INITIAL EVALUATION ADULT - OTHER INFO
77yo F, poor historian, w/ PMHx of HTN, HLD, AFib (on Xarelto), chronic venous stasis, DM T2, neuropathy, Hx of uterine CA who presented to [facility] ED endorsing 2 days of worsening SOB with exertion, now to the point that she can only walk a few steps. She reports a chronic SOB issue, but the severity has acutely worsened. Pt also reporting she has been having to sleep on the couch propped up for about 6 months (+ orthopnea). Pt was seen by pulmonologist Dr. 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 Pt denies palpitations, chest pain, dizziness, syncope, abdominal pain, N/V, fever/chills, cough. Patient found to have RVP (+) for entero/rhinovirus.     Patient seen at bedside for nutrition assessment. Current diet order: Consistent Carbohydrate, DASH/TLC. Confirms NKFA. No difficulty chewing/swallowing reported. Reports decreased appetite, patient consumed an english muffin and samaniego for breakfast this AM. PTA, endorses normal appetite and PO intake. Dosing weight: 170#, BMI 33.1, reports UBW of 169-170# and denies weight loss. Noted with DTI to buttocks, stage 2 pressure injury to L buttock, and +3 edema to BLE. Endorses diarrhea, however no report of BM per EMR. Labs: GFR 49, glucose trending  x 24 hours, HgbA1c 5.7%. Meds: folate, MVI, thiamin, vitamin D, vitamin B12. Observed with no overt signs and symptoms of muscle or fat wasting. Based on ASPEN guidelines, pt does not meet criteria for malnutrition at this time. Encouraged adequate PO intake and reviewed food preferences. Reviewed general healthful diet with emphasis on adequate protein for wound healing, adequate micronutrient intake, adequate fluids- patient expressed understanding. See nutrition recommendations below.  77yo F, poor historian, w/ PMHx of HTN, HLD, AFib (on Xarelto), chronic venous stasis, DM T2, neuropathy, Hx of uterine CA who presented to [facility] ED endorsing 2 days of worsening SOB with exertion, now to the point that she can only walk a few steps. She reports a chronic SOB issue, but the severity has acutely worsened. Pt also reporting she has been having to sleep on the couch propped up for about 6 months (+ orthopnea). Pt was seen by pulmonologist Dr. 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 Pt denies palpitations, chest pain, dizziness, syncope, abdominal pain, N/V, fever/chills, cough. Patient found to have RVP (+) for entero/rhinovirus.     Patient seen at bedside for nutrition assessment. Current diet order: Consistent Carbohydrate, DASH/TLC. Confirms NKFA. No difficulty chewing/swallowing reported. Reports decreased appetite since admit, patient consumed an english muffin and one slice of samaniego for breakfast this AM. PTA, endorses normal appetite and PO intake. Dosing weight: 170#, BMI 33.1, reports UBW of 169-170# and denies weight loss. Noted with DTI to buttocks, stage 2 pressure injury to L buttock, and +3 edema to BLE. Endorses diarrhea, however no report of BM per EMR. Labs: GFR 49, glucose trending  x 24 hours, HgbA1c 5.7%. Meds: folate, MVI, thiamin, vitamin D, vitamin B12. Observed with no overt signs and symptoms of muscle or fat wasting. Based on ASPEN guidelines, pt does not meet criteria for malnutrition at this time. Encouraged adequate PO intake and reviewed food preferences. Reviewed general healthful diet with emphasis on adequate protein for wound healing, adequate micronutrient intake, adequate fluids- patient expressed understanding. See nutrition recommendations below.

## 2023-11-23 NOTE — PROGRESS NOTE ADULT - PROBLEM SELECTOR PLAN 2
-TTE as above. CTSx consulted 11/22, [] await/appreciate recs. -TTE as above w/ PASP 116mmHg  -Saw OP Pulm Dr. Silvestre w/ Outpatient PFTs-moderate restrictive defect, with mildly reduced DLCO; O/p inhaler Anoro; not on Home O2  -New Hypoxia and oxygen demand as above  -Pulmonary following - no new recs 11/23, continue to f/u - regarding inpt PulmHTN w/u?

## 2023-11-23 NOTE — DIETITIAN INITIAL EVALUATION ADULT - OTHER CALCULATIONS
Based on Standards of Care pt above % IBW (170%) thus ideal body weight used for all calculations (100#). Needs adjusted for advanced age, pressure injury. Fluid recs per team in view of edema.

## 2023-11-23 NOTE — PROGRESS NOTE ADULT - SUBJECTIVE AND OBJECTIVE BOX
PULMONARY CONSULT SERVICE FOLLOW-UP NOTE    INTERVAL HPI:  Reviewed chart and overnight events; patient seen and examined at bedside.    MEDICATIONS:  Pulmonary:  albuterol/ipratropium for Nebulization 3 milliLiter(s) Nebulizer every 6 hours    Antimicrobials:    Anticoagulants:  rivaroxaban 20 milliGRAM(s) Oral with dinner    Cardiac:  metoprolol tartrate 50 milliGRAM(s) Oral two times a day      Allergies    No Known Allergies    Intolerances        Vital Signs Last 24 Hrs  T(C): 36.7 (23 Nov 2023 17:20), Max: 36.8 (23 Nov 2023 05:00)  T(F): 98 (23 Nov 2023 17:20), Max: 98.2 (23 Nov 2023 05:00)  HR: 62 (23 Nov 2023 17:20) (62 - 67)  BP: 138/93 (23 Nov 2023 17:20) (109/69 - 148/84)  BP(mean): 92 (23 Nov 2023 05:00) (92 - 110)  RR: 18 (23 Nov 2023 17:20) (16 - 18)  SpO2: 100% (23 Nov 2023 17:20) (75% - 100%)    Parameters below as of 23 Nov 2023 17:20  Patient On (Oxygen Delivery Method): nasal cannula  O2 Flow (L/min): 6      11-22 @ 07:01  -  11-23 @ 07:00  --------------------------------------------------------  IN: 0 mL / OUT: 1200 mL / NET: -1200 mL    11-23 @ 07:01  -  11-23 @ 19:48  --------------------------------------------------------  IN: 300 mL / OUT: 550 mL / NET: -250 mL          PHYSICAL EXAM:  Constitutional: NAD  HEENT: NC/AT; PERRL, anicteric sclera; MMM  Neck: supple  Cardiovascular: +S1/S2, RRR  Respiratory: CTA B/L; no W/R/R  Gastrointestinal: soft, NT/ND  Extremities: WWP; no edema, clubbing or cyanosis  Vascular: 2+ radial pulses B/L  Neurological: awake and alert; ROGER    LABS:      CBC Full  -  ( 23 Nov 2023 06:20 )  WBC Count : 6.73 K/uL  RBC Count : 4.16 M/uL  Hemoglobin : 12.8 g/dL  Hematocrit : 41.3 %  Platelet Count - Automated : 157 K/uL  Mean Cell Volume : 99.3 fl  Mean Cell Hemoglobin : 30.8 pg  Mean Cell Hemoglobin Concentration : 31.0 gm/dL  Auto Neutrophil # : x  Auto Lymphocyte # : x  Auto Monocyte # : x  Auto Eosinophil # : x  Auto Basophil # : x  Auto Neutrophil % : x  Auto Lymphocyte % : x  Auto Monocyte % : x  Auto Eosinophil % : x  Auto Basophil % : x    11-23    139  |  101  |  21  ----------------------------<  119<H>  4.0   |  26  |  1.14    Ca    9.3      23 Nov 2023 06:20  Mg     2.0     11-23    TPro  6.6  /  Alb  3.7  /  TBili  2.0<H>  /  DBili  0.8<H>  /  AST  25  /  ALT  23  /  AlkPhos  82  11-22          Urinalysis Basic - ( 23 Nov 2023 06:20 )    Color: x / Appearance: x / SG: x / pH: x  Gluc: 119 mg/dL / Ketone: x  / Bili: x / Urobili: x   Blood: x / Protein: x / Nitrite: x   Leuk Esterase: x / RBC: x / WBC x   Sq Epi: x / Non Sq Epi: x / Bacteria: x                RADIOLOGY & ADDITIONAL STUDIES:

## 2023-11-23 NOTE — PROGRESS NOTE ADULT - NSPROGADDITIONALINFOA_GEN_ALL_CORE
Attending Attestation:  I was physically present for the key portions of the evaluation and management (E/M) service provided.  I agree with the above history, physical, and plan which I have reviewed.  Michael Barbour MD (Cardiology)

## 2023-11-23 NOTE — DIETITIAN INITIAL EVALUATION ADULT - PERTINENT MEDS FT
MEDICATIONS  (STANDING):  albuterol/ipratropium for Nebulization 3 milliLiter(s) Nebulizer every 6 hours  atorvastatin 40 milliGRAM(s) Oral at bedtime  cholecalciferol 2000 Unit(s) Oral daily  cyanocobalamin 1000 MICROGram(s) Oral daily  dextrose 5%. 1000 milliLiter(s) (50 mL/Hr) IV Continuous <Continuous>  dextrose 5%. 1000 milliLiter(s) (100 mL/Hr) IV Continuous <Continuous>  dextrose 50% Injectable 12.5 Gram(s) IV Push once  dextrose 50% Injectable 25 Gram(s) IV Push once  dextrose 50% Injectable 25 Gram(s) IV Push once  folic acid 1 milliGRAM(s) Oral daily  glucagon  Injectable 1 milliGRAM(s) IntraMuscular once  insulin lispro (ADMELOG) corrective regimen sliding scale   SubCutaneous Before meals and at bedtime  metoprolol tartrate 50 milliGRAM(s) Oral two times a day  multivitamin 1 Tablet(s) Oral daily  nystatin Powder 1 Application(s) Topical two times a day  pantoprazole    Tablet 40 milliGRAM(s) Oral before breakfast  rivaroxaban 20 milliGRAM(s) Oral with dinner  thiamine 100 milliGRAM(s) Oral daily    MEDICATIONS  (PRN):  dextrose Oral Gel 15 Gram(s) Oral once PRN Blood Glucose LESS THAN 70 milliGRAM(s)/deciliter  LORazepam   Injectable 1 milliGRAM(s) IV Push every 1 hour PRN CIWA-Ar score 8 or greater  sodium chloride 0.65% Nasal 1 Spray(s) Both Nostrils every 3 hours PRN Nasal Congestion  zaleplon 5 milliGRAM(s) Oral at bedtime PRN Insomnia

## 2023-11-23 NOTE — DIETITIAN INITIAL EVALUATION ADULT - FUNCTIONAL SCREEN CURRENT LEVEL: SWALLOWING (IF SCORE 2 OR MORE FOR ANY ITEM, CONSULT REHAB SERVICES), MLM)
TRANSFER - OUT REPORT:    Verbal report given to [id number] Johnson RN(name) on Bin Chandler  being transferred to Care Unit(unit) for routine progression of care       Report consisted of patients Situation, Background, Assessment and   Recommendations(SBAR). Information from the following report(s) SBAR, Kardex and MAR was reviewed with the receiving nurse. Lines:   Peripheral IV 10/09/17 Left Forearm (Active)   Site Assessment Clean, dry, & intact 10/9/2017  7:22 AM   Phlebitis Assessment 0 10/9/2017  7:22 AM   Infiltration Assessment 0 10/9/2017  7:22 AM   Dressing Status Clean, dry, & intact 10/9/2017  7:22 AM   Dressing Type Transparent 10/9/2017  7:22 AM   Hub Color/Line Status Blue; Infusing 10/9/2017  7:22 AM   Alcohol Cap Used Yes 10/9/2017  7:22 AM        Opportunity for questions and clarification was provided.       Patient transported with:   Registered Nurse 0 = swallows foods/liquids without difficulty

## 2023-11-23 NOTE — DIETITIAN INITIAL EVALUATION ADULT - OBTAIN CURRENT WEIGHT
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 Father inquired if he could speak with pcp regarding HPV questions regarding patient, patient's mother, and himself.     Informed physician of this info    I informed father that pcp will give him a call the next business day    Best call back [phone] yes

## 2023-11-23 NOTE — PROGRESS NOTE ADULT - ASSESSMENT
79yo F, poor historian, w/ PMHx of HTN, HLD, AFib (on Xarelto), chronic venous stasis, DM T2, neuropathy, Hx of uterine CA who presented to [facility] ED endorsing 2 days of worsening SOB with minimal exertion; admitted to cardiology for further workup; course c/b new hypoxia and ETOH withdrawal.

## 2023-11-23 NOTE — PROGRESS NOTE ADULT - PROBLEM SELECTOR PLAN 4
Currently in NSR. C/w Xarelto 20mg PO QHS.    ##HTN (Hypertension)  - -140s.   - Home Nebivolol 10mg PO QD not on formulary. Uptitrating Lopressor as above.    ##HLD (Hyperlipidemia)  - Lipid panel well controlled, c/w Atorvastatin 40mg PO QHS. CIWA monitoring ordered for 2 glasses of vodka per day  - reports last drink 11/20/23  - s/p 2x 1mg IVP Ativan overnight 11/22 for CIWA >8  - D/w Hospitalist consult  who recommends Librium taper 25mg q8h x3 doses, then 25mg BID x2 doses, then 25mg Qdx 1, then off.  Given concern for hypoxia or decreasing respiratory drive, will keep this as PRN not standing for now. CIWA improved 11/23 day w/ scoring 5's  - SBIRT SW consulted CIWA monitoring ordered for 2 glasses of vodka per day  - reports last drink 11/20/23  - s/p 2x 1mg IVP Ativan overnight 11/22 for CIWA >8  - D/w Hospitalist consult  who recommends Librium taper 25mg q8h x3 doses, then 25mg BID x2 doses, then 25mg Qdx 1, then off.  Given concern for hypoxia or decreasing respiratory drive, will keep this as PRN not standing for now. CIWA improved 11/23 day w/ scoring 5's  - SBIRT SW consulted  - C/w MVI, Folate, Thiamine    ##Diarrhea  Pt reports subacute chronicity of diarrhea since recent ?2-3 weeks ago ?hemorrhoidectomy at [facility] with intermittent small BRBPR. None seen by RN/Staff yet- Monitor

## 2023-11-23 NOTE — DIETITIAN INITIAL EVALUATION ADULT - ADD RECOMMEND
1. Recommend DASH/TLC diet   2. Encourage and monitor PO intake, honor preferences as able   >> Consistently meet >75% of estimated needs during admission   >> Consider oral nutrition supplement or liberalizing diet should intake decline </= 50%   3. Monitor labs, wt trends, GI function, and skin integrity   4. Pain and bowel regimen per team   5. RD to remain available for additional nutrition interventions and diet edu as needed

## 2023-11-23 NOTE — DIETITIAN INITIAL EVALUATION ADULT - SIGNS/SYMPTOMS
as evidenced by pressure injuries 
no LE edema, normal equal distal pulses, steady unassisted gait.   No spinal ttp, neck FROM. Strength 5/5. No bony ttp, FROM all extremities. Normal equal distal pulses. Steady unassisted gait.    Physical Exam:    CONSTITUTIONAL:  Generally well appearing, no acute distress, alert, awake and oriented  HEENT:  Moist mucous membranes, normal voice  PULM:  No accessory muscle use, speaking full sentences  SKIN:  Normal in appearance, normal color

## 2023-11-23 NOTE — PROGRESS NOTE ADULT - SUBJECTIVE AND OBJECTIVE BOX
Interventional Cardiology PA Adult Progress Note    Subjective Assessment: Patient seen and examined at bedside.  Endorses she felt agitated last night after moving around for testing. Endorses SOB is fine at rest however exacerbated with any movement OOB.  On ROS, pt endorses subacute chronicity of diarrhea since recent ?2-3 weeks ago ?hemorrhoidectomy at [facility] with intermittent small BRBPR.  Denies fever/chills, CP, N/V. On further interview for ETOH abuse, endorses last drink was 11/20/23.    P  ***OVERNIGHT: Pt required CIWA Protocol 1mg Ativan IVP x2 overnight. Additionally, pt had Hypoxia event- please see OVERNIGHT EVENT NOTE.   	  MEDICATIONS:  metoprolol tartrate 25 milliGRAM(s) Oral two times a day      albuterol/ipratropium for Nebulization 3 milliLiter(s) Nebulizer every 6 hours    LORazepam   Injectable 1 milliGRAM(s) IV Push every 1 hour PRN  zaleplon 5 milliGRAM(s) Oral at bedtime PRN    pantoprazole    Tablet 40 milliGRAM(s) Oral before breakfast    atorvastatin 40 milliGRAM(s) Oral at bedtime  dextrose 50% Injectable 12.5 Gram(s) IV Push once  dextrose 50% Injectable 25 Gram(s) IV Push once  dextrose 50% Injectable 25 Gram(s) IV Push once  dextrose Oral Gel 15 Gram(s) Oral once PRN  glucagon  Injectable 1 milliGRAM(s) IntraMuscular once  insulin lispro (ADMELOG) corrective regimen sliding scale   SubCutaneous Before meals and at bedtime    cholecalciferol 2000 Unit(s) Oral daily  cyanocobalamin 1000 MICROGram(s) Oral daily  dextrose 5%. 1000 milliLiter(s) IV Continuous <Continuous>  dextrose 5%. 1000 milliLiter(s) IV Continuous <Continuous>  folic acid 1 milliGRAM(s) Oral daily  multivitamin 1 Tablet(s) Oral daily  nystatin Powder 1 Application(s) Topical two times a day  rivaroxaban 20 milliGRAM(s) Oral with dinner  sodium chloride 0.65% Nasal 1 Spray(s) Both Nostrils every 3 hours PRN  thiamine 100 milliGRAM(s) Oral daily        [PHYSICAL EXAM:  TELEMETRY:  T(C): 36.8 (11-23-23 @ 09:07), Max: 36.8 (11-23-23 @ 05:00)  HR: 66 (11-23-23 @ 09:07) (62 - 69)  BP: 117/57 (11-23-23 @ 09:07) (102/63 - 148/84)  RR: 16 (11-23-23 @ 09:07) (16 - 18)  SpO2: 98% (11-23-23 @ 09:07) (75% - 99%)  Wt(kg): --  I&O's Summary    22 Nov 2023 07:01  -  23 Nov 2023 07:00  --------------------------------------------------------  IN: 0 mL / OUT: 1200 mL / NET: -1200 mL        Appearance: Pt seen in bed in NAD	  HEENT:   Normal oral mucosa, PERRL, EOMI	  Neck: Supple,  - JVD ; Posture hunched with Kyphosis  Cardiovascular: Normal S1 S2, No JVD, + Sys murmur   Respiratory: Lungs clear to auscultation; No Rales, Rhonchi, Wheezing	  Gastrointestinal:  Soft, Non-tender, + BS	  Skin: + hyperpigmentation and severe skin wrinkling to B/L LE, toenail onychomycosis   Extremities: Normal range of motion  Vascular: Peripheral pulses palpable 2+ bilaterally  Neurologic: Non-focal; No pronator drift, no tongue fasciculations   Psychiatry: A & O x 3, Mood & affect appropriate  	    LABS:	 	  CARDIAC MARKERS:                                  12.8   6.73  )-----------( 157      ( 23 Nov 2023 06:20 )             41.3     11-23    139  |  101  |  21  ----------------------------<  119<H>  4.0   |  26  |  1.14    Ca    9.3      23 Nov 2023 06:20  Mg     2.0     11-23    TPro  6.6  /  Alb  3.7  /  TBili  2.0<H>  /  DBili  0.8<H>  /  AST  25  /  ALT  23  /  AlkPhos  82  11-22    proBNP:   Lipid Profile:   HgA1c:   TSH:   PT/INR - ( 21 Nov 2023 18:34 )   PT: 32.8 sec;   INR: 2.97          PTT - ( 21 Nov 2023 18:34 )  PTT:37.1 sec     Interventional Cardiology PA Adult Progress Note    Subjective Assessment: Patient seen and examined at bedside.  Endorses she felt agitated last night after moving around for testing. Endorses SOB is fine at rest however exacerbated with any movement OOB.  On ROS, pt endorses subacute chronicity of diarrhea since recent ?2-3 weeks ago ?hemorrhoidectomy at [facility] with intermittent small BRBPR.  Denies fever/chills, CP, N/V. On further interview for ETOH abuse, endorses last drink was 11/20/23.      ***OVERNIGHT: Pt required CIWA Protocol 1mg Ativan IVP x2 overnight. Additionally, pt had Hypoxia event- please see OVERNIGHT EVENT NOTE.   	  MEDICATIONS:  metoprolol tartrate 25 milliGRAM(s) Oral two times a day      albuterol/ipratropium for Nebulization 3 milliLiter(s) Nebulizer every 6 hours    LORazepam   Injectable 1 milliGRAM(s) IV Push every 1 hour PRN  zaleplon 5 milliGRAM(s) Oral at bedtime PRN    pantoprazole    Tablet 40 milliGRAM(s) Oral before breakfast    atorvastatin 40 milliGRAM(s) Oral at bedtime  dextrose 50% Injectable 12.5 Gram(s) IV Push once  dextrose 50% Injectable 25 Gram(s) IV Push once  dextrose 50% Injectable 25 Gram(s) IV Push once  dextrose Oral Gel 15 Gram(s) Oral once PRN  glucagon  Injectable 1 milliGRAM(s) IntraMuscular once  insulin lispro (ADMELOG) corrective regimen sliding scale   SubCutaneous Before meals and at bedtime    cholecalciferol 2000 Unit(s) Oral daily  cyanocobalamin 1000 MICROGram(s) Oral daily  dextrose 5%. 1000 milliLiter(s) IV Continuous <Continuous>  dextrose 5%. 1000 milliLiter(s) IV Continuous <Continuous>  folic acid 1 milliGRAM(s) Oral daily  multivitamin 1 Tablet(s) Oral daily  nystatin Powder 1 Application(s) Topical two times a day  rivaroxaban 20 milliGRAM(s) Oral with dinner  sodium chloride 0.65% Nasal 1 Spray(s) Both Nostrils every 3 hours PRN  thiamine 100 milliGRAM(s) Oral daily        [PHYSICAL EXAM:  TELEMETRY:  T(C): 36.8 (11-23-23 @ 09:07), Max: 36.8 (11-23-23 @ 05:00)  HR: 66 (11-23-23 @ 09:07) (62 - 69)  BP: 117/57 (11-23-23 @ 09:07) (102/63 - 148/84)  RR: 16 (11-23-23 @ 09:07) (16 - 18)  SpO2: 98% (11-23-23 @ 09:07) (75% - 99%)  Wt(kg): --  I&O's Summary    22 Nov 2023 07:01  -  23 Nov 2023 07:00  --------------------------------------------------------  IN: 0 mL / OUT: 1200 mL / NET: -1200 mL        Appearance: Pt seen in bed in NAD	  HEENT:   Normal oral mucosa, PERRL, EOMI	  Neck: Supple,  - JVD ; Posture hunched with Kyphosis  Cardiovascular: Normal S1 S2, No JVD, + Sys murmur   Respiratory: Lungs clear to auscultation; No Rales, Rhonchi, Wheezing	  Gastrointestinal:  Soft, Non-tender, + BS	  Skin: + hyperpigmentation and severe skin wrinkling to B/L LE, toenail onychomycosis   Extremities: Normal range of motion  Vascular: Peripheral pulses palpable 2+ bilaterally  Neurologic: Non-focal; No pronator drift, no tongue fasciculations   Psychiatry: A & O x 3, Mood & affect appropriate  	    LABS:	 	  CARDIAC MARKERS:                                  12.8   6.73  )-----------( 157      ( 23 Nov 2023 06:20 )             41.3     11-23    139  |  101  |  21  ----------------------------<  119<H>  4.0   |  26  |  1.14    Ca    9.3      23 Nov 2023 06:20  Mg     2.0     11-23    TPro  6.6  /  Alb  3.7  /  TBili  2.0<H>  /  DBili  0.8<H>  /  AST  25  /  ALT  23  /  AlkPhos  82  11-22    proBNP:   Lipid Profile:   HgA1c:   TSH:   PT/INR - ( 21 Nov 2023 18:34 )   PT: 32.8 sec;   INR: 2.97          PTT - ( 21 Nov 2023 18:34 )  PTT:37.1 sec

## 2023-11-24 DIAGNOSIS — I38 ENDOCARDITIS, VALVE UNSPECIFIED: ICD-10-CM

## 2023-11-24 DIAGNOSIS — J96.01 ACUTE RESPIRATORY FAILURE WITH HYPOXIA: ICD-10-CM

## 2023-11-24 LAB
ALBUMIN SERPL ELPH-MCNC: 3.4 G/DL — SIGNIFICANT CHANGE UP (ref 3.3–5)
ALBUMIN SERPL ELPH-MCNC: 3.4 G/DL — SIGNIFICANT CHANGE UP (ref 3.3–5)
ALP SERPL-CCNC: 70 U/L — SIGNIFICANT CHANGE UP (ref 40–120)
ALP SERPL-CCNC: 70 U/L — SIGNIFICANT CHANGE UP (ref 40–120)
ALT FLD-CCNC: 20 U/L — SIGNIFICANT CHANGE UP (ref 10–45)
ALT FLD-CCNC: 20 U/L — SIGNIFICANT CHANGE UP (ref 10–45)
ANION GAP SERPL CALC-SCNC: 11 MMOL/L — SIGNIFICANT CHANGE UP (ref 5–17)
ANION GAP SERPL CALC-SCNC: 11 MMOL/L — SIGNIFICANT CHANGE UP (ref 5–17)
AST SERPL-CCNC: 25 U/L — SIGNIFICANT CHANGE UP (ref 10–40)
AST SERPL-CCNC: 25 U/L — SIGNIFICANT CHANGE UP (ref 10–40)
BILIRUB SERPL-MCNC: 1.4 MG/DL — HIGH (ref 0.2–1.2)
BILIRUB SERPL-MCNC: 1.4 MG/DL — HIGH (ref 0.2–1.2)
BUN SERPL-MCNC: 21 MG/DL — SIGNIFICANT CHANGE UP (ref 7–23)
BUN SERPL-MCNC: 21 MG/DL — SIGNIFICANT CHANGE UP (ref 7–23)
CALCIUM SERPL-MCNC: 9.6 MG/DL — SIGNIFICANT CHANGE UP (ref 8.4–10.5)
CALCIUM SERPL-MCNC: 9.6 MG/DL — SIGNIFICANT CHANGE UP (ref 8.4–10.5)
CHLORIDE SERPL-SCNC: 103 MMOL/L — SIGNIFICANT CHANGE UP (ref 96–108)
CHLORIDE SERPL-SCNC: 103 MMOL/L — SIGNIFICANT CHANGE UP (ref 96–108)
CO2 SERPL-SCNC: 27 MMOL/L — SIGNIFICANT CHANGE UP (ref 22–31)
CO2 SERPL-SCNC: 27 MMOL/L — SIGNIFICANT CHANGE UP (ref 22–31)
CREAT SERPL-MCNC: 1.11 MG/DL — SIGNIFICANT CHANGE UP (ref 0.5–1.3)
CREAT SERPL-MCNC: 1.11 MG/DL — SIGNIFICANT CHANGE UP (ref 0.5–1.3)
EGFR: 50 ML/MIN/1.73M2 — LOW
EGFR: 50 ML/MIN/1.73M2 — LOW
GLUCOSE BLDC GLUCOMTR-MCNC: 107 MG/DL — HIGH (ref 70–99)
GLUCOSE BLDC GLUCOMTR-MCNC: 107 MG/DL — HIGH (ref 70–99)
GLUCOSE BLDC GLUCOMTR-MCNC: 109 MG/DL — HIGH (ref 70–99)
GLUCOSE BLDC GLUCOMTR-MCNC: 109 MG/DL — HIGH (ref 70–99)
GLUCOSE SERPL-MCNC: 110 MG/DL — HIGH (ref 70–99)
GLUCOSE SERPL-MCNC: 110 MG/DL — HIGH (ref 70–99)
HCT VFR BLD CALC: 40.4 % — SIGNIFICANT CHANGE UP (ref 34.5–45)
HCT VFR BLD CALC: 40.4 % — SIGNIFICANT CHANGE UP (ref 34.5–45)
HGB BLD-MCNC: 12.6 G/DL — SIGNIFICANT CHANGE UP (ref 11.5–15.5)
HGB BLD-MCNC: 12.6 G/DL — SIGNIFICANT CHANGE UP (ref 11.5–15.5)
MAGNESIUM SERPL-MCNC: 1.8 MG/DL — SIGNIFICANT CHANGE UP (ref 1.6–2.6)
MAGNESIUM SERPL-MCNC: 1.8 MG/DL — SIGNIFICANT CHANGE UP (ref 1.6–2.6)
MCHC RBC-ENTMCNC: 30.8 PG — SIGNIFICANT CHANGE UP (ref 27–34)
MCHC RBC-ENTMCNC: 30.8 PG — SIGNIFICANT CHANGE UP (ref 27–34)
MCHC RBC-ENTMCNC: 31.2 GM/DL — LOW (ref 32–36)
MCHC RBC-ENTMCNC: 31.2 GM/DL — LOW (ref 32–36)
MCV RBC AUTO: 98.8 FL — SIGNIFICANT CHANGE UP (ref 80–100)
MCV RBC AUTO: 98.8 FL — SIGNIFICANT CHANGE UP (ref 80–100)
NRBC # BLD: 0 /100 WBCS — SIGNIFICANT CHANGE UP (ref 0–0)
NRBC # BLD: 0 /100 WBCS — SIGNIFICANT CHANGE UP (ref 0–0)
PLATELET # BLD AUTO: 145 K/UL — LOW (ref 150–400)
PLATELET # BLD AUTO: 145 K/UL — LOW (ref 150–400)
POTASSIUM SERPL-MCNC: 4.1 MMOL/L — SIGNIFICANT CHANGE UP (ref 3.5–5.3)
POTASSIUM SERPL-MCNC: 4.1 MMOL/L — SIGNIFICANT CHANGE UP (ref 3.5–5.3)
POTASSIUM SERPL-SCNC: 4.1 MMOL/L — SIGNIFICANT CHANGE UP (ref 3.5–5.3)
POTASSIUM SERPL-SCNC: 4.1 MMOL/L — SIGNIFICANT CHANGE UP (ref 3.5–5.3)
PROT SERPL-MCNC: 6.2 G/DL — SIGNIFICANT CHANGE UP (ref 6–8.3)
PROT SERPL-MCNC: 6.2 G/DL — SIGNIFICANT CHANGE UP (ref 6–8.3)
RBC # BLD: 4.09 M/UL — SIGNIFICANT CHANGE UP (ref 3.8–5.2)
RBC # BLD: 4.09 M/UL — SIGNIFICANT CHANGE UP (ref 3.8–5.2)
RBC # FLD: 20.3 % — HIGH (ref 10.3–14.5)
RBC # FLD: 20.3 % — HIGH (ref 10.3–14.5)
SODIUM SERPL-SCNC: 141 MMOL/L — SIGNIFICANT CHANGE UP (ref 135–145)
SODIUM SERPL-SCNC: 141 MMOL/L — SIGNIFICANT CHANGE UP (ref 135–145)
WBC # BLD: 5.2 K/UL — SIGNIFICANT CHANGE UP (ref 3.8–10.5)
WBC # BLD: 5.2 K/UL — SIGNIFICANT CHANGE UP (ref 3.8–10.5)
WBC # FLD AUTO: 5.2 K/UL — SIGNIFICANT CHANGE UP (ref 3.8–10.5)
WBC # FLD AUTO: 5.2 K/UL — SIGNIFICANT CHANGE UP (ref 3.8–10.5)

## 2023-11-24 PROCEDURE — 99221 1ST HOSP IP/OBS SF/LOW 40: CPT

## 2023-11-24 PROCEDURE — 99232 SBSQ HOSP IP/OBS MODERATE 35: CPT

## 2023-11-24 PROCEDURE — 99233 SBSQ HOSP IP/OBS HIGH 50: CPT

## 2023-11-24 PROCEDURE — 93308 TTE F-UP OR LMTD: CPT | Mod: 26

## 2023-11-24 RX ORDER — METOPROLOL TARTRATE 50 MG
50 TABLET ORAL ONCE
Refills: 0 | Status: DISCONTINUED | OUTPATIENT
Start: 2023-11-24 | End: 2023-11-24

## 2023-11-24 RX ORDER — METOPROLOL TARTRATE 50 MG
100 TABLET ORAL
Refills: 0 | Status: DISCONTINUED | OUTPATIENT
Start: 2023-11-24 | End: 2023-11-30

## 2023-11-24 RX ADMIN — Medication 3 MILLILITER(S): at 13:18

## 2023-11-24 RX ADMIN — Medication 3 MILLILITER(S): at 22:11

## 2023-11-24 RX ADMIN — RIVAROXABAN 20 MILLIGRAM(S): KIT at 17:44

## 2023-11-24 RX ADMIN — NYSTATIN CREAM 1 APPLICATION(S): 100000 CREAM TOPICAL at 17:46

## 2023-11-24 RX ADMIN — Medication 50 MILLIGRAM(S): at 06:04

## 2023-11-24 RX ADMIN — PREGABALIN 1000 MICROGRAM(S): 225 CAPSULE ORAL at 13:18

## 2023-11-24 RX ADMIN — Medication 100 MILLIGRAM(S): at 13:18

## 2023-11-24 RX ADMIN — Medication 3 MILLILITER(S): at 17:44

## 2023-11-24 RX ADMIN — Medication 1 TABLET(S): at 13:18

## 2023-11-24 RX ADMIN — Medication 2000 UNIT(S): at 13:18

## 2023-11-24 RX ADMIN — Medication 100 MILLIGRAM(S): at 17:44

## 2023-11-24 RX ADMIN — NYSTATIN CREAM 1 APPLICATION(S): 100000 CREAM TOPICAL at 06:03

## 2023-11-24 RX ADMIN — Medication 1 MILLIGRAM(S): at 13:18

## 2023-11-24 RX ADMIN — Medication 5 MILLIGRAM(S): at 00:36

## 2023-11-24 RX ADMIN — ATORVASTATIN CALCIUM 40 MILLIGRAM(S): 80 TABLET, FILM COATED ORAL at 22:11

## 2023-11-24 RX ADMIN — PANTOPRAZOLE SODIUM 40 MILLIGRAM(S): 20 TABLET, DELAYED RELEASE ORAL at 06:00

## 2023-11-24 NOTE — PHYSICAL THERAPY INITIAL EVALUATION ADULT - IMPAIRMENTS CONTRIBUTING TO GAIT DEVIATIONS, PT EVAL
slightly unsteady; increased forward flexion/kyphosis; decreased endurance/impaired balance/impaired postural control/decreased strength

## 2023-11-24 NOTE — PROGRESS NOTE ADULT - PROBLEM SELECTOR PLAN 4
Currently in NSR. C/w Xarelto 20mg PO QHS.    ##HTN (Hypertension)  - -140s.   - Home Nebivolol 10mg PO QD not on formulary. Uptitrating Lopressor as above.    ##HLD (Hyperlipidemia)  - Lipid panel well controlled, c/w Atorvastatin 40mg PO QHS. Currently in NSR. C/w Xarelto 20mg PO QHS.

## 2023-11-24 NOTE — PROGRESS NOTE ADULT - PROBLEM SELECTOR PLAN 3
CIWA monitoring ordered for 2 glasses of vodka per day; last drink 11/20/23  - s/p 2x 1mg IVP Ativan 11/22 for CIWA >8  - D/w Hospitalist consult rec'd Librium taper however given concern for decreasing respiratory drive, will keep this as PRN not standing for now. CIWA improved 11/23 day w/ scoring 5's  - SBIRT SW consulted  - C/w MVI, Folate, Thiamine CIWA monitoring ordered for 2 glasses of vodka per day; last drink 11/20/23  - s/p 2x 1mg IVP Ativan 11/22 for CIWA >8  - D/w Hospitalist consult rec'd Librium taper however given concern for decreasing respiratory drive, will keep this as PRN not standing for now. CIWA improved 11/23 day w/ scoring 5's  - C/w MVI, Folate, Thiamine

## 2023-11-24 NOTE — CONSULT NOTE ADULT - SUBJECTIVE AND OBJECTIVE BOX
Surgeon: Dr. Woods    Requesting Physician: Dr. Barbour    HISTORY OF PRESENT ILLNESS (Need 4):  Patient    PAST MEDICAL & SURGICAL HISTORY:  Atrial fibrillation      Peripheral neuropathy      HTN (hypertension)      HLD (hyperlipidemia)      HARRIS (nonalcoholic steatohepatitis)      Neuropathy      Kerr's esophagus      Uterine cancer      DM (diabetes mellitus)      History of cholecystectomy      H/O total hysterectomy      H/O shoulder surgery          MEDICATIONS  (STANDING):  albuterol/ipratropium for Nebulization 3 milliLiter(s) Nebulizer every 6 hours  atorvastatin 40 milliGRAM(s) Oral at bedtime  cholecalciferol 2000 Unit(s) Oral daily  cyanocobalamin 1000 MICROGram(s) Oral daily  folic acid 1 milliGRAM(s) Oral daily  metoprolol tartrate 50 milliGRAM(s) Oral two times a day  multivitamin 1 Tablet(s) Oral daily  nystatin Powder 1 Application(s) Topical two times a day  pantoprazole    Tablet 40 milliGRAM(s) Oral before breakfast  rivaroxaban 20 milliGRAM(s) Oral with dinner  thiamine 100 milliGRAM(s) Oral daily    MEDICATIONS  (PRN):  sodium chloride 0.65% Nasal 1 Spray(s) Both Nostrils every 3 hours PRN Nasal Congestion  zaleplon 5 milliGRAM(s) Oral at bedtime PRN Insomnia      Allergies    No Known Allergies    Intolerances    SOCIAL HISTORY:  Smoker:  YES / NO        PACK YEARS:                         WHEN QUIT?  ETOH use:  YES / NO               FREQUENCY / QUANTITY:  Ilicit Drug use:  YES / NO  Occupation:  Assisted device use (Cane / Walker):  Live with:    FAMILY HISTORY:  No pertinent family history in first degree relatives        Review of Systems (Need 10):  CONSTITUTIONAL: Denies fevers / chills, sweats, fatigue, weight loss, weight gain                                       NEURO:  Denies parathesias, seizures, syncope, confusion                                                                                  EYES:  Denies blurry vision, discharge, pain, loss of vision                                                                                    ENMT:  Denies difficulty hearing, vertigo, dysphagia, epistaxis, recent dental work                                       CV:  Denies chest pain, palpitations, GREGORY, orthopnea                                                                                           RESPIRATORY:  Denies wWheezing, SOB, cough / sputum, hemoptysis                                                               GI:  Denies nausea, vomiting, diarrhea, constipation, melena                                                                          : Denies hematuria, dysuria, urgency, incontinence                                                                                          MUSKULOSKELETAL:  Denies arthritis, joint swelling, muscle weakness                                                             SKIN/BREAST:  Denies rash, itching, hair loss, masses                                                                                              PSYCH:  Denies depression, anxiety, suicidal ideation                                                                                                HEME/LYMPH:  Denies bruises easily, enlarged lymph nodes, tender lymph nodes                                          ENDOCRINE:  Denies cold intolerance, heat intolerance, polydipsia                                                                      Vital Signs Last 24 Hrs  T(C): 36.2 (24 Nov 2023 09:20), Max: 36.7 (23 Nov 2023 17:20)  T(F): 97.1 (24 Nov 2023 09:20), Max: 98 (23 Nov 2023 17:20)  HR: 62 (24 Nov 2023 09:20) (59 - 67)  BP: 119/56 (24 Nov 2023 09:20) (119/56 - 147/73)  BP(mean): 98 (24 Nov 2023 07:20) (93 - 98)  RR: 18 (24 Nov 2023 09:20) (18 - 18)  SpO2: 97% (24 Nov 2023 09:20) (95% - 100%)    Parameters below as of 24 Nov 2023 09:20  Patient On (Oxygen Delivery Method): nasal cannula  O2 Flow (L/min): 4      Physical Exam (Need 8)  GEN: NAD, looks comfortable  Psych: Mood appropriate  Neuro: A&Ox3.  No focal deficits.  Moving all extremities.   HEENT: No obvious abnormalities  CV: S1S2, regular, no murmurs appreciated.  No carotid bruits.  No JVD  Lungs: Clear B/L.  No wheezing, rales or rhonchi  ABD: Soft, non-tender, non-distended.  +Bowel sounds  EXT: Warm and well perfused.  No peripheral edema noted  Musculoskeletal: Moving all extremities with normal ROM, no joint swelling  PV: Pedal pulses palpable    LABS:                        12.6   5.20  )-----------( 145      ( 24 Nov 2023 05:30 )             40.4     11-24    141  |  103  |  21  ----------------------------<  110<H>  4.1   |  27  |  1.11    Ca    9.6      24 Nov 2023 05:30  Mg     1.8     11-24    TPro  6.2  /  Alb  3.4  /  TBili  1.4<H>  /  DBili  x   /  AST  25  /  ALT  20  /  AlkPhos  70  11-24      Urinalysis Basic - ( 24 Nov 2023 05:30 )    Color: x / Appearance: x / SG: x / pH: x  Gluc: 110 mg/dL / Ketone: x  / Bili: x / Urobili: x   Blood: x / Protein: x / Nitrite: x   Leuk Esterase: x / RBC: x / WBC x   Sq Epi: x / Non Sq Epi: x / Bacteria: x              RADIOLOGY & ADDITIONAL STUDIES:    echo< from: TTE Echo Complete w/ Contrast w/ Doppler (11.22.23 @ 13:48) >  -----  CONCLUSIONS:     1. Hyperdynamic left ventricular systolic function with cavity   obliteration resulting in an intra-cavitary gradient of 66.00 mmHg with  Valsalva maneuver. Left ventricular ejection fraction is >75%.   2. Mild symmetric left ventricular hypertrophy.   3. Dilated right ventricular size.   4. Normal right ventricular systolic function.   5. Biatrial enlargement.   6. Moderate aortic stenosis.   7. Mild mitral stenosis.   8. Mild mitral regurgitation.   9. Moderate tricuspid regurgitation.  10. Pulmonary hypertension present, pulmonary artery systolic pressure is   116 mmHg.  11. No pericardial effusion.    < end of copied text >   Surgeon: Dr. Woods    Requesting Physician: Dr. Barbour    HISTORY OF PRESENT ILLNESS (Need 4):  Patient still requiring oxygen but states her SOB is much improved from when she first came in.  Her legs are chronically  hyperpigmented.  Denies CP/SOB/N/V/D/dizziness/cough/fever/chills.      PAST MEDICAL & SURGICAL HISTORY:  Atrial fibrillation      Peripheral neuropathy      HTN (hypertension)      HLD (hyperlipidemia)      HARRIS (nonalcoholic steatohepatitis)      Neuropathy      Kerr's esophagus      Uterine cancer      DM (diabetes mellitus)      History of cholecystectomy      H/O total hysterectomy      H/O shoulder surgery          MEDICATIONS  (STANDING):  albuterol/ipratropium for Nebulization 3 milliLiter(s) Nebulizer every 6 hours  atorvastatin 40 milliGRAM(s) Oral at bedtime  cholecalciferol 2000 Unit(s) Oral daily  cyanocobalamin 1000 MICROGram(s) Oral daily  folic acid 1 milliGRAM(s) Oral daily  metoprolol tartrate 50 milliGRAM(s) Oral two times a day  multivitamin 1 Tablet(s) Oral daily  nystatin Powder 1 Application(s) Topical two times a day  pantoprazole    Tablet 40 milliGRAM(s) Oral before breakfast  rivaroxaban 20 milliGRAM(s) Oral with dinner  thiamine 100 milliGRAM(s) Oral daily    MEDICATIONS  (PRN):  sodium chloride 0.65% Nasal 1 Spray(s) Both Nostrils every 3 hours PRN Nasal Congestion  zaleplon 5 milliGRAM(s) Oral at bedtime PRN Insomnia      Allergies    No Known Allergies    Intolerances      FAMILY HISTORY:  No pertinent family history in first degree relatives        Review of Systems (Need 10):  CONSTITUTIONAL: Denies fevers / chills, sweats, fatigue, weight loss, weight gain                                       NEURO:  Denies parathesias, seizures, syncope, confusion                                                                                  EYES:  Denies blurry vision, discharge, pain, loss of vision                                                                                    ENMT:  Denies difficulty hearing, vertigo, dysphagia, epistaxis, recent dental work                                       CV:  Denies chest pain, palpitations, GREGORY, orthopnea                                                                                           RESPIRATORY:  Denies wWheezing, SOB, cough / sputum, hemoptysis                                                               GI:  Denies nausea, vomiting, diarrhea, constipation, melena                                                                          : Denies hematuria, dysuria, urgency, incontinence                                                                                          MUSKULOSKELETAL:  Denies arthritis, joint swelling, muscle weakness                                                             SKIN/BREAST:  Denies rash, itching, hair loss, masses                                                                                              PSYCH:  Denies depression, anxiety, suicidal ideation                                                                                                HEME/LYMPH:  Denies bruises easily, enlarged lymph nodes, tender lymph nodes                                          ENDOCRINE:  Denies cold intolerance, heat intolerance, polydipsia                                                                      Vital Signs Last 24 Hrs  T(C): 36.2 (24 Nov 2023 09:20), Max: 36.7 (23 Nov 2023 17:20)  T(F): 97.1 (24 Nov 2023 09:20), Max: 98 (23 Nov 2023 17:20)  HR: 62 (24 Nov 2023 09:20) (59 - 67)  BP: 119/56 (24 Nov 2023 09:20) (119/56 - 147/73)  BP(mean): 98 (24 Nov 2023 07:20) (93 - 98)  RR: 18 (24 Nov 2023 09:20) (18 - 18)  SpO2: 97% (24 Nov 2023 09:20) (95% - 100%)    Parameters below as of 24 Nov 2023 09:20  Patient On (Oxygen Delivery Method): nasal cannula  O2 Flow (L/min): 4      Physical Exam (Need 8)  GEN: NAD, looks comfortable; Just a little anxious about her diagnosis  Psych: Mood appropriate  Neuro: A&Ox3.  No focal deficits.  Moving all extremities.   HEENT: No obvious abnormalities  CV: S1S2, regular, +murmur.  No carotid bruits.  No JVD  Lungs: Clear B/L.  No wheezing, rales or rhonchi  ABD: Soft, non-tender, non-distended.  +Bowel sounds  EXT: Warm and well perfused.  +hyperpigmentation changes (venous stasis changes) to LE B/L below knees.. woody appearance with generalized swelling.    Musculoskeletal: Moving all extremities with normal ROM, no joint swelling  PV: Pedal pulses palpable    LABS:                        12.6   5.20  )-----------( 145      ( 24 Nov 2023 05:30 )             40.4     11-24    141  |  103  |  21  ----------------------------<  110<H>  4.1   |  27  |  1.11    Ca    9.6      24 Nov 2023 05:30  Mg     1.8     11-24    TPro  6.2  /  Alb  3.4  /  TBili  1.4<H>  /  DBili  x   /  AST  25  /  ALT  20  /  AlkPhos  70  11-24      Urinalysis Basic - ( 24 Nov 2023 05:30 )    Color: x / Appearance: x / SG: x / pH: x  Gluc: 110 mg/dL / Ketone: x  / Bili: x / Urobili: x   Blood: x / Protein: x / Nitrite: x   Leuk Esterase: x / RBC: x / WBC x   Sq Epi: x / Non Sq Epi: x / Bacteria: x              RADIOLOGY & ADDITIONAL STUDIES:    echo< from: TTE Echo Complete w/ Contrast w/ Doppler (11.22.23 @ 13:48) >  -----  CONCLUSIONS:     1. Hyperdynamic left ventricular systolic function with cavity   obliteration resulting in an intra-cavitary gradient of 66.00 mmHg with  Valsalva maneuver. Left ventricular ejection fraction is >75%.   2. Mild symmetric left ventricular hypertrophy.   3. Dilated right ventricular size.   4. Normal right ventricular systolic function.   5. Biatrial enlargement.   6. Moderate aortic stenosis.   7. Mild mitral stenosis.   8. Mild mitral regurgitation.   9. Moderate tricuspid regurgitation.  10. Pulmonary hypertension present, pulmonary artery systolic pressure is   116 mmHg.  11. No pericardial effusion.    < end of copied text >

## 2023-11-24 NOTE — PROGRESS NOTE ADULT - ASSESSMENT
77 y/o F, poor historian, w/ PMHx of HTN, HLD, AFib (on Xarelto), chronic venous stasis, DM T2, neuropathy, Hx of uterine CA who presented to [facility] ED endorsing 2 days of worsening SOB with minimal exertion; admitted to cardiology for further workup; course c/b new hypoxia and ETOH withdrawal.

## 2023-11-24 NOTE — PHYSICAL THERAPY INITIAL EVALUATION ADULT - PERTINENT HX OF CURRENT PROBLEM, REHAB EVAL
Pt is 77yo F, poor historian, w/ PMHx of HTN, HLD, AFib (on Xarelto), chronic venous stasis, DM T2, neuropathy, Hx of uterine CA who presented to [facility] ED endorsing 2 days of worsening SOB with exertion, now to the point that she can only walk a few steps. She reports a chronic SOB issue, but the severity has acutely worsened. Pt also reporting she has been having to sleep on the couch propped up for about 6 months (+ orthopnea).

## 2023-11-24 NOTE — PROGRESS NOTE ADULT - PROBLEM SELECTOR PLAN 5
- A1c 5.7%. Prediabetic;  Hold home Jardiance inpt.      ##bilateral lower extremity venous stasis wounds  (B/L lower extremities, R inner thigh, Back)  - Wound care consulted, await/appreciate recs    N: DASH/TLC diet   E: Replete lytes PRN   P: DVT PPX: Xarelto  C: FULL CODE   Dispo: pending workup;  PT consult    Case discussed with Dr. Barbour, Medicine and Pulmonology. - A1c 5.7%. Prediabetic;  Hold home Jardiance inpt.      #HTN (Hypertension)  - -140s.   - Home Nebivolol 10mg PO QD not on formulary. Uptitrating Lopressor as above.    #HLD (Hyperlipidemia)  - Lipid panel well controlled, c/w Atorvastatin 40mg PO QHS.    #Venous stasis wounds    - Wound care consulted, await/appreciate recs    N: DASH/TLC diet   E: Replete lytes PRN   P: DVT PPX: Xarelto  C: FULL CODE   Dispo: pending workup;  PT consult    Case discussed with Dr. Barbour, Medicine and Pulmonology.

## 2023-11-24 NOTE — PHYSICAL THERAPY INITIAL EVALUATION ADULT - GENERAL OBSERVATIONS, REHAB EVAL
Patient received semi-supine in bed in no apparent distress, +NC @4L/min, +pulseOx, +primafit, +tele, +heplock.

## 2023-11-24 NOTE — PROGRESS NOTE ADULT - PROBLEM SELECTOR PLAN 1
-2/2 + enterovirus/rhinovirus, restrictive lung dz, HF  -O2: Weaned to 4L spO2 98%  -Pulm eval 4/2023 revealed mild restrictive disease due to spinal kyphosis.  Pulm rec'd outpt eval  -Diuretics: None as euvolemic  -Duonebs q6hrs PRN. OOBTC and incentive spirometry

## 2023-11-24 NOTE — PROGRESS NOTE ADULT - SUBJECTIVE AND OBJECTIVE BOX
Feeling okay today, has been dealing with chronically numb feet for a while.  Breathing OK.  Denies any chest pain.     MEDICATIONS:  metoprolol tartrate 50 milliGRAM(s) Oral two times a day  albuterol/ipratropium for Nebulization 3 milliLiter(s) Nebulizer every 6 hours  zaleplon 5 milliGRAM(s) Oral at bedtime PRN  pantoprazole    Tablet 40 milliGRAM(s) Oral before breakfast  atorvastatin 40 milliGRAM(s) Oral at bedtime  cholecalciferol 2000 Unit(s) Oral daily  cyanocobalamin 1000 MICROGram(s) Oral daily  folic acid 1 milliGRAM(s) Oral daily  multivitamin 1 Tablet(s) Oral daily  nystatin Powder 1 Application(s) Topical two times a day  rivaroxaban 20 milliGRAM(s) Oral with dinner  sodium chloride 0.65% Nasal 1 Spray(s) Both Nostrils every 3 hours PRN  thiamine 100 milliGRAM(s) Oral daily    	  VITAL SIGNS:  T(C): 36.2 (11-24-23 @ 09:20), Max: 36.7 (11-23-23 @ 17:20)  HR: 62 (11-24-23 @ 09:20) (59 - 67)  BP: 119/56 (11-24-23 @ 09:20) (119/56 - 147/73)  RR: 18 (11-24-23 @ 09:20) (18 - 18)  SpO2: 97% (11-24-23 @ 09:20) (95% - 100%)  Wt(kg): --    I&O's Summary    23 Nov 2023 07:01  -  24 Nov 2023 07:00  --------------------------------------------------------  IN: 300 mL / OUT: 550 mL / NET: -250 mL    General: no acute distress, wearing nasal cannula, sitting up in bed eating lunch  HEENT: normocephalic, atraumatic, MMM  Cards: irr irr, systolic murmur  Pulm: slight bibasilar crackles, no wheeze  Ab: soft, nontender, nondistended, normoactive bowel sounds  Ext: hyperpigmentation of bilateral lower extremities, no lower extremity edema, warm and wel

## 2023-11-24 NOTE — PROGRESS NOTE ADULT - ASSESSMENT
78F with PMH of atrial fibrillation on xarelto, HTN, HLD, DM2 with neuropathy and history of uterine cancer presenting with exertional dyspnea and admitted to the cardiology service for further workup.     #Hypoxia  #Dyspnea  #Acute hypoxic respiratory failure  TTE 11/22/23 w/ Hyperdynamic LVSF, EF >75%,  w/ cavity obliteration resulting in an intra-cavitary gradient of 66.00 mmHg w/ Valsalva maneuver; mild LVH; Dilated RV. MARISOL. Mod AS/TR. Mild MS/MR.  PulmHTN PASP 116 mmHg.  - received IV lasix, no longer on diuretics  - enterovirus positive, potential cause for persistent hypoxia  - appreciate input from pulmonology:   Outpatient PFTs-moderate restrictive defect, with mildly reduced DLCO  - concern for precapillary pulmonary hypertension, and will need outpatient pulm follow up  - wean oxygen as able    #HTN  #HLD  - plan per primary team    #Alcohol withdrawal  - continue CIWA monitor  - not requiring any ativan    #Atrial fibrillation  - continue xarelto, metoprolol

## 2023-11-24 NOTE — PROGRESS NOTE ADULT - SUBJECTIVE AND OBJECTIVE BOX
Cardiology PA Adult Progress Note    SUBJECTIVE ASSESSMENT:  	  MEDICATIONS:  metoprolol tartrate 50 milliGRAM(s) Oral two times a day  albuterol/ipratropium for Nebulization 3 milliLiter(s) Nebulizer every 6 hours  zaleplon 5 milliGRAM(s) Oral at bedtime PRN  pantoprazole    Tablet 40 milliGRAM(s) Oral before breakfast  atorvastatin 40 milliGRAM(s) Oral at bedtime  cholecalciferol 2000 Unit(s) Oral daily  cyanocobalamin 1000 MICROGram(s) Oral daily  folic acid 1 milliGRAM(s) Oral daily  multivitamin 1 Tablet(s) Oral daily  nystatin Powder 1 Application(s) Topical two times a day  rivaroxaban 20 milliGRAM(s) Oral with dinner  sodium chloride 0.65% Nasal 1 Spray(s) Both Nostrils every 3 hours PRN  thiamine 100 milliGRAM(s) Oral daily    	  VITAL SIGNS:  T(C): 36.2 (11-24-23 @ 09:20), Max: 36.7 (11-23-23 @ 17:20)  HR: 62 (11-24-23 @ 09:20) (59 - 67)  BP: 119/56 (11-24-23 @ 09:20) (119/56 - 147/73)  RR: 18 (11-24-23 @ 09:20) (18 - 18)  SpO2: 97% (11-24-23 @ 09:20) (95% - 100%)  Wt(kg): --    I&O's Summary    23 Nov 2023 07:01  -  24 Nov 2023 07:00  --------------------------------------------------------  IN: 300 mL / OUT: 550 mL / NET: -250 mL    24 Nov 2023 07:01  -  24 Nov 2023 11:43  --------------------------------------------------------  IN: 180 mL / OUT: 0 mL / NET: 180 mL                                           PHYSICAL EXAM:  Appearance: Normal	  HEENT: Normal oral mucosa, PERRL, EOMI	  Neck: Supple, + JVD/ - JVD; ___ Carotid Bruit   Cardiovascular: Normal S1 S2, No murmurs  Respiratory: Lungs clear to auscultation/Decreased Breath Sounds/No Rales, Rhonchi, Wheezing	  Gastrointestinal:  Soft, Non-tender, + BS	  Skin: No rashes, No ecchymoses, No cyanosis  Extremities: Normal range of motion, No clubbing, cyanosis or edema  Vascular: Peripheral pulses palpable 2+ bilaterally  Neurologic: Non-focal  Psychiatry: A & O x 3, Mood & affect appropriate    LABS:	 	                                  12.6   5.20  )-----------( 145      ( 24 Nov 2023 05:30 )             40.4     11-24    141  |  103  |  21  ----------------------------<  110<H>  4.1   |  27  |  1.11    Ca    9.6      24 Nov 2023 05:30  Mg     1.8     11-24    TPro  6.2  /  Alb  3.4  /  TBili  1.4<H>  /  DBili  x   /  AST  25  /  ALT  20  /  AlkPhos  70  11-24    proBNP:   Lipid Profile:   HgA1c:   TSH:    Cardiology PA Adult Progress Note    SUBJECTIVE ASSESSMENT: Seen and examined at bedside. Pt states she feels breathing improved; currently 98% on 4 L NC. Denies CP, palpitations or any other sx.  	  MEDICATIONS:  metoprolol tartrate 50 milliGRAM(s) Oral two times a day  albuterol/ipratropium for Nebulization 3 milliLiter(s) Nebulizer every 6 hours  zaleplon 5 milliGRAM(s) Oral at bedtime PRN  pantoprazole    Tablet 40 milliGRAM(s) Oral before breakfast  atorvastatin 40 milliGRAM(s) Oral at bedtime  cholecalciferol 2000 Unit(s) Oral daily  cyanocobalamin 1000 MICROGram(s) Oral daily  folic acid 1 milliGRAM(s) Oral daily  multivitamin 1 Tablet(s) Oral daily  nystatin Powder 1 Application(s) Topical two times a day  rivaroxaban 20 milliGRAM(s) Oral with dinner  sodium chloride 0.65% Nasal 1 Spray(s) Both Nostrils every 3 hours PRN  thiamine 100 milliGRAM(s) Oral daily    	  VITAL SIGNS:  T(C): 36.2 (11-24-23 @ 09:20), Max: 36.7 (11-23-23 @ 17:20)  HR: 62 (11-24-23 @ 09:20) (59 - 67)  BP: 119/56 (11-24-23 @ 09:20) (119/56 - 147/73)  RR: 18 (11-24-23 @ 09:20) (18 - 18)  SpO2: 97% (11-24-23 @ 09:20) (95% - 100%)  Wt(kg): --    I&O's Summary    23 Nov 2023 07:01  -  24 Nov 2023 07:00  --------------------------------------------------------  IN: 300 mL / OUT: 550 mL / NET: -250 mL    24 Nov 2023 07:01  -  24 Nov 2023 11:43  --------------------------------------------------------  IN: 180 mL / OUT: 0 mL / NET: 180 mL                  PHYSICAL EXAM:  Appearance: Normal	  HEENT: Normal oral mucosa, PERRL, EOMI	  Neck: Supple, - JVD; No Carotid Bruit   Cardiovascular: +II/VI SM  Respiratory: Dec breath sounds at bases  Gastrointestinal:  Soft, Non-tender, + BS	  Skin: No rashes, No ecchymoses, No cyanosis  Extremities: Normal range of motion, No clubbing, cyanosis or edema  Vascular: Peripheral pulses palpable 2+ bilaterally  Neurologic: Non-focal  Psychiatry: A & O x 3, Mood & affect appropriate    LABS:	 	                        12.6   5.20  )-----------( 145      ( 24 Nov 2023 05:30 )             40.4     11-24    141  |  103  |  21  ----------------------------<  110<H>  4.1   |  27  |  1.11    Ca    9.6      24 Nov 2023 05:30  Mg     1.8     11-24    TPro  6.2  /  Alb  3.4  /  TBili  1.4<H>  /  DBili  x   /  AST  25  /  ALT  20  /  AlkPhos  70  11-24

## 2023-11-24 NOTE — CONSULT NOTE ADULT - ASSESSMENT
Pt is an 77 y/o female, poor historian, w/ PMHx of HTN, HLD, AFib (on Xarelto), chronic venous stasis, DM T2, neuropathy, Hx of uterine CA who presented to [facility] ED endorsing 2 days of worsening SOB with exertion, now to the point that she can only walk a few steps. She reports a chronic SOB issue, but the severity has acutely worsened. Pt also reporting she has been having to sleep on the couch propped up for about 6 months (+ orthopnea). Pt was seen by pulmonologist Dr. 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 Pt denies palpitations, chest pain, dizziness, syncope, abdominal pain, N/V, fever/chills, cough. Patient found to have RVP (+) for entero/rhinovirus.  Echo revealed significant valve disease (see results above) and elevated LVOT gradient.  SHD being consulted for procedural evaluation.        Plan:  Problem 1: AS/LVOT obstruction.  Recommend titrating up BB whenever able to tolerate.  Hold diuresis for now.  Repeat echo when better optimized from HR/BP standpoint to reassess gradients and severity of AS.  Will follow along, and based on repeat echo findings will possibly switch over from oral A/C to IV heparin gtt and perform cardiac cath, as early as this Monday.  Will follow over the weekend      Problem 2: HTN.  Treating w/ BB      Problem 3: HLD.  Statin as tolerated.        Problem 4: AFib. On A/C.  Can use Heparin if getting procedure    I have reviewed clinical labs tests and reports, radiology tests and reports, as well as old patient medical records, and discussed with the refering physician.     Pt is an 77 y/o female, poor historian, w/ PMHx of HTN, HLD, AFib (on Xarelto), chronic venous stasis, DM T2, neuropathy, Hx of uterine CA who presented to [facility] ED endorsing 2 days of worsening SOB with exertion, now to the point that she can only walk a few steps. She reports a chronic SOB issue, but the severity has acutely worsened. Pt also reporting she has been having to sleep on the couch propped up for about 6 months (+ orthopnea). Pt was seen by pulmonologist Dr. 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 Pt denies palpitations, chest pain, dizziness, syncope, abdominal pain, N/V, fever/chills, cough. Patient found to have RVP (+) for entero/rhinovirus.  Echo revealed significant valve disease (see results above) and elevated LVOT gradient.  SHD being consulted for procedural evaluation.        Plan:  Problem 1: AS/LVOT obstruction.  Recommend titrating up BB whenever able to tolerate.  Hold diuresis for now.  Repeat echo when better optimized from HR/BP standpoint to reassess gradients and severity of AS.  Will follow along, and based on repeat echo findings will possibly switch over from oral A/C to IV heparin gtt and perform cardiac cath, as early as this Monday.  Will follow over the weekend      Problem 2: HTN.  Treating w/ BB      Problem 3: HLD.  Statin as tolerated.        Problem 4: AFib. On A/C.  Can use Heparin if getting procedure    I have reviewed clinical labs tests and reports, radiology tests and reports, as well as old patient medical records, and discussed with the refering physician.     Pt is an 79 y/o female, poor historian, w/ PMHx of HTN, HLD, AFib (on Xarelto), chronic venous stasis, DM T2, neuropathy, Hx of uterine CA who presented to [facility] ED endorsing 2 days of worsening SOB with exertion, now to the point that she can only walk a few steps. She reports a chronic SOB issue, but the severity has acutely worsened. Pt also reporting she has been having to sleep on the couch propped up for about 6 months (+ orthopnea). Pt was seen by pulmonologist Dr. 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 Pt denies palpitations, chest pain, dizziness, syncope, abdominal pain, N/V, fever/chills, cough. Patient found to have RVP (+) for entero/rhinovirus.  Echo revealed significant valve disease (see results above) and elevated LVOT gradient.  SHD being consulted for procedural evaluation.        Plan:  Problem 1: AS/LVOT obstruction.  Recommend titrating up BB whenever able to tolerate.  Hold diuresis for now.  Repeat echo when better optimized from HR/BP standpoint to reassess gradients and severity of AS.  Will follow along, and based on repeat echo findings will possibly switch over from oral A/C to IV heparin gtt and perform cardiac cath, as early as this Monday.  Will follow over the weekend      Problem 2: HTN.  Treating w/ BB      Problem 3: HLD.  Statin as tolerated.        Problem 4: AFib. On A/C.  Can use Heparin if getting procedure    I have reviewed clinical labs tests and reports, radiology tests and reports, as well as old patient medical records, and discussed with the refering physician.     Pt is an 77 y/o female, poor historian, w/ PMHx of HTN, HLD, AFib (on Xarelto), chronic venous stasis, DM T2, neuropathy, Hx of uterine CA who presented to [facility] ED endorsing 2 days of worsening SOB with exertion, now to the point that she can only walk a few steps. She reports a chronic SOB issue, but the severity has acutely worsened. Pt also reporting she has been having to sleep on the couch propped up for about 6 months (+ orthopnea). Pt was seen by pulmonologist Dr. 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 Pt denies palpitations, chest pain, dizziness, syncope, abdominal pain, N/V, fever/chills, cough. Patient found to have RVP (+) for entero/rhinovirus.  Echo revealed significant valve disease (see results above) and elevated LVOT gradient.  SHD being consulted for procedural evaluation.        Plan:  Problem 1: AS/LVOT obstruction.  Recommend titrating up BB whenever able to tolerate.  Hold diuresis for now.  Repeat echo when better optimized from HR/BP standpoint to reassess gradients and severity of AS.  Will follow along, and based on repeat echo findings will possibly switch over from oral A/C to IV heparin gtt and perform cardiac cath, as early as this Monday.  Will follow over the weekend      Problem 2: HTN.  Treating w/ BB      Problem 3: HLD.  Statin as tolerated.        Problem 4: AFib. On A/C.  Can use Heparin if getting procedure    I have reviewed clinical labs tests and reports, radiology tests and reports, as well as old patient medical records, and discussed with the refering physician.     Pt is an 77 y/o female, poor historian, w/ PMHx of HTN, HLD, AFib (on Xarelto), chronic venous stasis, DM T2, neuropathy, Hx of uterine CA who presented to [facility] ED endorsing 2 days of worsening SOB with exertion, now to the point that she can only walk a few steps. She reports a chronic SOB issue, but the severity has acutely worsened. Pt also reporting she has been having to sleep on the couch propped up for about 6 months (+ orthopnea). Pt was seen by pulmonologist Dr. 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 Pt denies palpitations, chest pain, dizziness, syncope, abdominal pain, N/V, fever/chills, cough. Patient found to have RVP (+) for entero/rhinovirus.  Echo revealed significant valve disease (see results above) and elevated LVOT gradient.  SHD being consulted for procedural evaluation.        Plan:  Problem 1: AS/LVOT obstruction.  Recommend titrating up BB whenever able to tolerate.  Hold diuresis for now.  Repeat echo when better optimized from HR/BP standpoint to reassess gradients and severity of AS.  Will follow along, and based on repeat echo findings will possibly switch over from oral A/C to IV heparin gtt and perform cardiac cath, as early as this Monday.  Will follow over the weekend      Problem 2: HTN.  Treating w/ BB      Problem 3: HLD.  Statin as tolerated.        Problem 4: AFib. On A/C.  Can use Heparin if getting procedure    I have reviewed clinical labs tests and reports, radiology tests and reports, as well as old patient medical records, and discussed with the refering physician.

## 2023-11-24 NOTE — PROGRESS NOTE ADULT - NSPROGADDITIONALINFOA_GEN_ALL_CORE
Attending Attestation:  I was physically present for the key portions of the evaluation and management (E/M) service provided.  I agree with the above history, physical, and plan which I have reviewed with the following edits/addendum:    78F w known parox Afib (on Xarelto), chronic venous stasis and lymphedema, DMT2, uterine Ca, who p/w worsening GREGORY and orthopnea found to have rhino/enterovirus URTI admitted for hypoxic resp failure. CXR w bilateral congestion and effusion. Initially in sinus, now in Afib. Reviewed TTE images 11/22 noted LV intracavitary gradient (66 mm hg) due to hyperdynamic systolic function with moderate AS, PASP ~116. Repeated echo 11/24 while on beta blockers: PASP 55 mm Hg, moderate AS, intracavitary gradient not well seen. RV dilated with normal function.    - increase metoprolol tartrate dose to 100 bid. HRs now in 50s. Hold parameters in place.  - discussed plan with Dr Woods for L/RHC on Monday to evaluate PH and valve dse  - avoiding dehydration due to intracav gradient. Stopped IV diuretics. Legs are mildly edematous, w chronic skin changes but overall pt appears euvolemic.   - wean off O2 support. hypoxia likely from viral URTI + restrictive lung dse     Michael Barbour MD  Cardiology

## 2023-11-24 NOTE — PHYSICAL THERAPY INITIAL EVALUATION ADULT - ADDITIONAL COMMENTS
Patient lives alone in apartment with elevator, +doorman. Patient has a walk-in shower, but notes she is slightly concerned when showering because of her b/l LE neuropathy.

## 2023-11-24 NOTE — PROGRESS NOTE ADULT - PROBLEM SELECTOR PLAN 2
-TTE 11/22/23 w/ Hyperdynamic LVSF, EF >75%, w/ cavity obliteration resulting in an intra-cavitary gradient of 66.00 mmHg w/ Valsalva maneuver, Mod AS/TR, PASP 116 mmHg.   -Limited TTE 11/24: Improved intracavitary gradients, mod AS, mod PH  -PLAN: Inc lopressor to 100 mg BID

## 2023-11-25 LAB
ANION GAP SERPL CALC-SCNC: 10 MMOL/L — SIGNIFICANT CHANGE UP (ref 5–17)
ANION GAP SERPL CALC-SCNC: 10 MMOL/L — SIGNIFICANT CHANGE UP (ref 5–17)
APTT BLD: 28.3 SEC — SIGNIFICANT CHANGE UP (ref 24.5–35.6)
APTT BLD: 28.3 SEC — SIGNIFICANT CHANGE UP (ref 24.5–35.6)
BUN SERPL-MCNC: 21 MG/DL — SIGNIFICANT CHANGE UP (ref 7–23)
BUN SERPL-MCNC: 21 MG/DL — SIGNIFICANT CHANGE UP (ref 7–23)
CALCIUM SERPL-MCNC: 9.6 MG/DL — SIGNIFICANT CHANGE UP (ref 8.4–10.5)
CALCIUM SERPL-MCNC: 9.6 MG/DL — SIGNIFICANT CHANGE UP (ref 8.4–10.5)
CHLORIDE SERPL-SCNC: 102 MMOL/L — SIGNIFICANT CHANGE UP (ref 96–108)
CHLORIDE SERPL-SCNC: 102 MMOL/L — SIGNIFICANT CHANGE UP (ref 96–108)
CO2 SERPL-SCNC: 26 MMOL/L — SIGNIFICANT CHANGE UP (ref 22–31)
CO2 SERPL-SCNC: 26 MMOL/L — SIGNIFICANT CHANGE UP (ref 22–31)
CREAT SERPL-MCNC: 1.11 MG/DL — SIGNIFICANT CHANGE UP (ref 0.5–1.3)
CREAT SERPL-MCNC: 1.11 MG/DL — SIGNIFICANT CHANGE UP (ref 0.5–1.3)
EGFR: 50 ML/MIN/1.73M2 — LOW
EGFR: 50 ML/MIN/1.73M2 — LOW
GLUCOSE BLDC GLUCOMTR-MCNC: 111 MG/DL — HIGH (ref 70–99)
GLUCOSE BLDC GLUCOMTR-MCNC: 111 MG/DL — HIGH (ref 70–99)
GLUCOSE BLDC GLUCOMTR-MCNC: 118 MG/DL — HIGH (ref 70–99)
GLUCOSE BLDC GLUCOMTR-MCNC: 118 MG/DL — HIGH (ref 70–99)
GLUCOSE SERPL-MCNC: 125 MG/DL — HIGH (ref 70–99)
GLUCOSE SERPL-MCNC: 125 MG/DL — HIGH (ref 70–99)
HCT VFR BLD CALC: 42.3 % — SIGNIFICANT CHANGE UP (ref 34.5–45)
HCT VFR BLD CALC: 42.3 % — SIGNIFICANT CHANGE UP (ref 34.5–45)
HGB BLD-MCNC: 13.2 G/DL — SIGNIFICANT CHANGE UP (ref 11.5–15.5)
HGB BLD-MCNC: 13.2 G/DL — SIGNIFICANT CHANGE UP (ref 11.5–15.5)
INR BLD: 1.32 — HIGH (ref 0.85–1.18)
INR BLD: 1.32 — HIGH (ref 0.85–1.18)
MAGNESIUM SERPL-MCNC: 1.8 MG/DL — SIGNIFICANT CHANGE UP (ref 1.6–2.6)
MAGNESIUM SERPL-MCNC: 1.8 MG/DL — SIGNIFICANT CHANGE UP (ref 1.6–2.6)
MCHC RBC-ENTMCNC: 30.8 PG — SIGNIFICANT CHANGE UP (ref 27–34)
MCHC RBC-ENTMCNC: 30.8 PG — SIGNIFICANT CHANGE UP (ref 27–34)
MCHC RBC-ENTMCNC: 31.2 GM/DL — LOW (ref 32–36)
MCHC RBC-ENTMCNC: 31.2 GM/DL — LOW (ref 32–36)
MCV RBC AUTO: 98.8 FL — SIGNIFICANT CHANGE UP (ref 80–100)
MCV RBC AUTO: 98.8 FL — SIGNIFICANT CHANGE UP (ref 80–100)
NRBC # BLD: 0 /100 WBCS — SIGNIFICANT CHANGE UP (ref 0–0)
NRBC # BLD: 0 /100 WBCS — SIGNIFICANT CHANGE UP (ref 0–0)
PLATELET # BLD AUTO: 145 K/UL — LOW (ref 150–400)
PLATELET # BLD AUTO: 145 K/UL — LOW (ref 150–400)
POTASSIUM SERPL-MCNC: 4 MMOL/L — SIGNIFICANT CHANGE UP (ref 3.5–5.3)
POTASSIUM SERPL-MCNC: 4 MMOL/L — SIGNIFICANT CHANGE UP (ref 3.5–5.3)
POTASSIUM SERPL-SCNC: 4 MMOL/L — SIGNIFICANT CHANGE UP (ref 3.5–5.3)
POTASSIUM SERPL-SCNC: 4 MMOL/L — SIGNIFICANT CHANGE UP (ref 3.5–5.3)
PROTHROM AB SERPL-ACNC: 14.9 SEC — HIGH (ref 9.5–13)
PROTHROM AB SERPL-ACNC: 14.9 SEC — HIGH (ref 9.5–13)
RBC # BLD: 4.28 M/UL — SIGNIFICANT CHANGE UP (ref 3.8–5.2)
RBC # BLD: 4.28 M/UL — SIGNIFICANT CHANGE UP (ref 3.8–5.2)
RBC # FLD: 19.8 % — HIGH (ref 10.3–14.5)
RBC # FLD: 19.8 % — HIGH (ref 10.3–14.5)
SODIUM SERPL-SCNC: 138 MMOL/L — SIGNIFICANT CHANGE UP (ref 135–145)
SODIUM SERPL-SCNC: 138 MMOL/L — SIGNIFICANT CHANGE UP (ref 135–145)
WBC # BLD: 6.11 K/UL — SIGNIFICANT CHANGE UP (ref 3.8–10.5)
WBC # BLD: 6.11 K/UL — SIGNIFICANT CHANGE UP (ref 3.8–10.5)
WBC # FLD AUTO: 6.11 K/UL — SIGNIFICANT CHANGE UP (ref 3.8–10.5)
WBC # FLD AUTO: 6.11 K/UL — SIGNIFICANT CHANGE UP (ref 3.8–10.5)

## 2023-11-25 PROCEDURE — 99232 SBSQ HOSP IP/OBS MODERATE 35: CPT

## 2023-11-25 PROCEDURE — 99233 SBSQ HOSP IP/OBS HIGH 50: CPT

## 2023-11-25 RX ORDER — HEPARIN SODIUM 5000 [USP'U]/ML
6500 INJECTION INTRAVENOUS; SUBCUTANEOUS EVERY 6 HOURS
Refills: 0 | Status: DISCONTINUED | OUTPATIENT
Start: 2023-11-25 | End: 2023-11-26

## 2023-11-25 RX ORDER — MAGNESIUM OXIDE 400 MG ORAL TABLET 241.3 MG
400 TABLET ORAL ONCE
Refills: 0 | Status: COMPLETED | OUTPATIENT
Start: 2023-11-25 | End: 2023-11-25

## 2023-11-25 RX ORDER — HEPARIN SODIUM 5000 [USP'U]/ML
INJECTION INTRAVENOUS; SUBCUTANEOUS
Qty: 25000 | Refills: 0 | Status: DISCONTINUED | OUTPATIENT
Start: 2023-11-25 | End: 2023-11-26

## 2023-11-25 RX ORDER — HEPARIN SODIUM 5000 [USP'U]/ML
3000 INJECTION INTRAVENOUS; SUBCUTANEOUS EVERY 6 HOURS
Refills: 0 | Status: DISCONTINUED | OUTPATIENT
Start: 2023-11-25 | End: 2023-11-26

## 2023-11-25 RX ORDER — SENNA PLUS 8.6 MG/1
2 TABLET ORAL AT BEDTIME
Refills: 0 | Status: DISCONTINUED | OUTPATIENT
Start: 2023-11-25 | End: 2023-11-30

## 2023-11-25 RX ADMIN — HEPARIN SODIUM 1400 UNIT(S)/HR: 5000 INJECTION INTRAVENOUS; SUBCUTANEOUS at 20:23

## 2023-11-25 RX ADMIN — Medication 5 MILLIGRAM(S): at 01:08

## 2023-11-25 RX ADMIN — Medication 100 MILLIGRAM(S): at 20:15

## 2023-11-25 RX ADMIN — Medication 3 MILLILITER(S): at 16:03

## 2023-11-25 RX ADMIN — Medication 2000 UNIT(S): at 11:10

## 2023-11-25 RX ADMIN — ATORVASTATIN CALCIUM 40 MILLIGRAM(S): 80 TABLET, FILM COATED ORAL at 22:06

## 2023-11-25 RX ADMIN — PREGABALIN 1000 MICROGRAM(S): 225 CAPSULE ORAL at 11:12

## 2023-11-25 RX ADMIN — Medication 100 MILLIGRAM(S): at 11:12

## 2023-11-25 RX ADMIN — Medication 1 TABLET(S): at 11:11

## 2023-11-25 RX ADMIN — Medication 100 MILLIGRAM(S): at 06:11

## 2023-11-25 RX ADMIN — PANTOPRAZOLE SODIUM 40 MILLIGRAM(S): 20 TABLET, DELAYED RELEASE ORAL at 06:11

## 2023-11-25 RX ADMIN — NYSTATIN CREAM 1 APPLICATION(S): 100000 CREAM TOPICAL at 08:27

## 2023-11-25 RX ADMIN — Medication 3 MILLILITER(S): at 22:06

## 2023-11-25 RX ADMIN — NYSTATIN CREAM 1 APPLICATION(S): 100000 CREAM TOPICAL at 18:31

## 2023-11-25 RX ADMIN — HEPARIN SODIUM 1400 UNIT(S)/HR: 5000 INJECTION INTRAVENOUS; SUBCUTANEOUS at 18:48

## 2023-11-25 RX ADMIN — Medication 3 MILLILITER(S): at 09:36

## 2023-11-25 RX ADMIN — Medication 1 MILLIGRAM(S): at 11:11

## 2023-11-25 RX ADMIN — MAGNESIUM OXIDE 400 MG ORAL TABLET 400 MILLIGRAM(S): 241.3 TABLET ORAL at 18:30

## 2023-11-25 RX ADMIN — SENNA PLUS 2 TABLET(S): 8.6 TABLET ORAL at 22:06

## 2023-11-25 NOTE — PROGRESS NOTE ADULT - ASSESSMENT
78F with PMH of atrial fibrillation on xarelto, HTN, HLD, DM2 with neuropathy and history of uterine cancer presenting with exertional dyspnea and admitted to the cardiology service for further workup.     #Hypoxia  #Dyspnea  #Acute hypoxic respiratory failure  TTE 11/22/23 w/ Hyperdynamic LVSF, EF >75%,  w/ cavity obliteration resulting in an intra-cavitary gradient of 66.00 mmHg w/ Valsalva maneuver; mild LVH; Dilated RV. MARISOL. Mod AS/TR. Mild MS/MR.  PulmHTN PASP 116 mmHg.  - received IV lasix, no longer on diuretics  - enterovirus positive, potential cause for persistent hypoxia  - appreciate input from pulmonology:   Outpatient PFTs-moderate restrictive defect, with mildly reduced DLCO  - concern for precapillary pulmonary hypertension, and will need outpatient pulm follow up  - wean oxygen as able  - improvement in LVOT, structural heart team consult appreciated  - plan for RHC and LHC on monday    #HTN  #HLD  - plan per primary team    #Alcohol withdrawal  - continue CIWA monitor  - not requiring any ativan    #Atrial fibrillation  - continue xarelto, metoprolol

## 2023-11-25 NOTE — PROGRESS NOTE ADULT - NSPROGADDITIONALINFOA_GEN_ALL_CORE
Attending Attestation:  I was physically present for the key portions of the evaluation and management (E/M) service provided.  I agree with the above history, physical, and plan which I have reviewed with the following edits/addendum:    78F w known parox Afib (on Xarelto), chronic venous stasis and lymphedema, DMT2, uterine Ca, who p/w worsening GREGORY and orthopnea found to have rhino/enterovirus URTI admitted for hypoxic resp failure. CXR w bilateral congestion and effusion. Initially in sinus, now in Afib. Reviewed TTE images 11/22 noted LV intracavitary gradient (66 mm hg) due to hyperdynamic systolic function with moderate AS, PASP ~116. Repeated echo 11/24 while on beta blockers: PASP 55 mm Hg, moderate AS, intracavitary gradient not well seen. RV dilated with normal function.    - increase metoprolol tartrate dose to 100 bid. HRs now in 50s. Hold parameters in place.  - discussed plan with Dr Woods for L/RHC on Monday to evaluate PH and valve dse  - avoiding dehydration due to intracav gradient. Stopped IV diuretics. Legs are mildly edematous, w chronic skin changes but overall pt appears euvolemic. Pt now off O2 support, hypoxia likely from viral URTI + restrictive lung dse     Michael Barbour MD  Cardiology Attending Attestation:  I was physically present for the key portions of the evaluation and management (E/M) service provided.  I agree with the above history, physical, and plan which I have reviewed with the following edits/addendum:    78F w known parox Afib (on Xarelto), chronic venous stasis and lymphedema, DMT2, uterine Ca, who p/w worsening GREGORY and orthopnea found to have rhino/enterovirus URTI admitted for hypoxic resp failure. CXR w bilateral congestion and effusion. Initially in sinus, now in Afib. Reviewed TTE images 11/22 noted LV intracavitary gradient (66 mm hg) due to hyperdynamic systolic function with moderate AS, PASP ~116. Repeated echo 11/24 while on beta blockers: PASP 55 mm Hg, moderate AS, intracavitary gradient not well seen. RV dilated with normal function.    - metoprolol tartrate dose 100 bid with HRs now in 50s. Hold parameters in place.  - discussed plan with Dr Woods for L/RHC on Monday to evaluate PH and valve dse. Suspect mixed PH etiology.  - avoiding dehydration due to intracav gradient. Stopped IV diuretics. Legs are mildly edematous, w chronic skin changes but overall pt appears euvolemic.   - pt now off O2 support, hypoxia likely from viral URTI + restrictive lung dse    Michael Barbour MD  Cardiology

## 2023-11-25 NOTE — PROGRESS NOTE ADULT - SUBJECTIVE AND OBJECTIVE BOX
No issues denies any chest pain, shortness of breath.  Wondering about plan for Monday, including the right and left heart catheterizations.      OVERNIGHT EVENTS: NAEO    Remaining ROS negative       PHYSICAL EXAM:  General: no acute distress, wearing nasal cannula, sitting up in bed eating lunch  HEENT: normocephalic, atraumatic, MMM  Cards: irr irr, systolic murmur  Pulm: no wheeze, slight bibasilar crackles but much improved  Ab: soft, nontender, nondistended, normoactive bowel sounds  Ext: hyperpigmentation of bilateral lower extremities, no lower extremity edema, warm and wel    VITAL SIGNS:  Vital Signs Last 24 Hrs  T(C): 36.5 (25 Nov 2023 11:00), Max: 36.7 (24 Nov 2023 20:32)  T(F): 97.7 (25 Nov 2023 11:00), Max: 98 (24 Nov 2023 20:32)  HR: 55 (25 Nov 2023 11:00) (54 - 75)  BP: 129/60 (25 Nov 2023 11:00) (106/59 - 144/64)  BP(mean): 87 (25 Nov 2023 06:10) (75 - 87)  RR: 18 (25 Nov 2023 11:00) (18 - 18)  SpO2: 93% (25 Nov 2023 11:00) (93% - 100%)    Parameters below as of 25 Nov 2023 11:00  Patient On (Oxygen Delivery Method): nasal cannula  O2 Flow (L/min): 3        MEDICATIONS:  MEDICATIONS  (STANDING):  albuterol/ipratropium for Nebulization 3 milliLiter(s) Nebulizer every 6 hours  atorvastatin 40 milliGRAM(s) Oral at bedtime  cholecalciferol 2000 Unit(s) Oral daily  cyanocobalamin 1000 MICROGram(s) Oral daily  folic acid 1 milliGRAM(s) Oral daily  metoprolol tartrate 100 milliGRAM(s) Oral two times a day  multivitamin 1 Tablet(s) Oral daily  nystatin Powder 1 Application(s) Topical two times a day  pantoprazole    Tablet 40 milliGRAM(s) Oral before breakfast    MEDICATIONS  (PRN):  sodium chloride 0.65% Nasal 1 Spray(s) Both Nostrils every 3 hours PRN Nasal Congestion  zaleplon 5 milliGRAM(s) Oral at bedtime PRN Insomnia      ALLERGIES:  Allergies    No Known Allergies    Intolerances        LABS:                        13.2   6.11  )-----------( 145      ( 25 Nov 2023 06:33 )             42.3     11-25    138  |  102  |  21  ----------------------------<  125<H>  4.0   |  26  |  1.11    Ca    9.6      25 Nov 2023 06:33  Mg     1.8     11-25    TPro  6.2  /  Alb  3.4  /  TBili  1.4<H>  /  DBili  x   /  AST  25  /  ALT  20  /  AlkPhos  70  11-24      Urinalysis Basic - ( 25 Nov 2023 06:33 )    Color: x / Appearance: x / SG: x / pH: x  Gluc: 125 mg/dL / Ketone: x  / Bili: x / Urobili: x   Blood: x / Protein: x / Nitrite: x   Leuk Esterase: x / RBC: x / WBC x   Sq Epi: x / Non Sq Epi: x / Bacteria: x      CAPILLARY BLOOD GLUCOSE      POCT Blood Glucose.: 118 mg/dL (25 Nov 2023 12:02)      RADIOLOGY & ADDITIONAL TESTS: Reviewed.

## 2023-11-25 NOTE — PROGRESS NOTE ADULT - SUBJECTIVE AND OBJECTIVE BOX
Cardiology PA Adult Progress Note    SUBJECTIVE ASSESSMENT:  	  MEDICATIONS:  metoprolol tartrate 100 milliGRAM(s) Oral two times a day  albuterol/ipratropium for Nebulization 3 milliLiter(s) Nebulizer every 6 hours  zaleplon 5 milliGRAM(s) Oral at bedtime PRN  pantoprazole    Tablet 40 milliGRAM(s) Oral before breakfast  atorvastatin 40 milliGRAM(s) Oral at bedtime  cholecalciferol 2000 Unit(s) Oral daily  cyanocobalamin 1000 MICROGram(s) Oral daily  folic acid 1 milliGRAM(s) Oral daily  multivitamin 1 Tablet(s) Oral daily  nystatin Powder 1 Application(s) Topical two times a day  rivaroxaban 20 milliGRAM(s) Oral with dinner  sodium chloride 0.65% Nasal 1 Spray(s) Both Nostrils every 3 hours PRN  thiamine 100 milliGRAM(s) Oral daily    	  VITAL SIGNS:  T(C): 36.1 (11-25-23 @ 06:10), Max: 36.7 (11-24-23 @ 20:32)  HR: 75 (11-25-23 @ 06:10) (62 - 75)  BP: 124/60 (11-25-23 @ 06:10) (106/59 - 144/64)  RR: 18 (11-25-23 @ 06:10) (18 - 18)  SpO2: 96% (11-25-23 @ 06:10) (88% - 100%)  Wt(kg): --    I&O's Summary    24 Nov 2023 07:01  -  25 Nov 2023 07:00  --------------------------------------------------------  IN: 840 mL / OUT: 0 mL / NET: 840 mL                                           PHYSICAL EXAM:  Appearance: Normal	  HEENT: Normal oral mucosa, PERRL, EOMI	  Neck: Supple, + JVD/ - JVD; ___ Carotid Bruit   Cardiovascular: Normal S1 S2, No murmurs  Respiratory: Lungs clear to auscultation/Decreased Breath Sounds/No Rales, Rhonchi, Wheezing	  Gastrointestinal:  Soft, Non-tender, + BS	  Skin: No rashes, No ecchymoses, No cyanosis  Extremities: Normal range of motion, No clubbing, cyanosis or edema  Vascular: Peripheral pulses palpable 2+ bilaterally  Neurologic: Non-focal  Psychiatry: A & O x 3, Mood & affect appropriate    LABS:	 	                                  13.2   6.11  )-----------( 145      ( 25 Nov 2023 06:33 )             42.3     11-25    138  |  102  |  21  ----------------------------<  125<H>  4.0   |  26  |  1.11    Ca    9.6      25 Nov 2023 06:33  Mg     1.8     11-25    TPro  6.2  /  Alb  3.4  /  TBili  1.4<H>  /  DBili  x   /  AST  25  /  ALT  20  /  AlkPhos  70  11-24    proBNP:   Lipid Profile:   HgA1c:   TSH:    Cardiology PA Adult Progress Note    SUBJECTIVE ASSESSMENT: Seen and examined at bedside. States she feels fine. Denies SOB, palpitations, CP sx.  	  MEDICATIONS:  metoprolol tartrate 100 milliGRAM(s) Oral two times a day  albuterol/ipratropium for Nebulization 3 milliLiter(s) Nebulizer every 6 hours  zaleplon 5 milliGRAM(s) Oral at bedtime PRN  pantoprazole    Tablet 40 milliGRAM(s) Oral before breakfast  atorvastatin 40 milliGRAM(s) Oral at bedtime  cholecalciferol 2000 Unit(s) Oral daily  cyanocobalamin 1000 MICROGram(s) Oral daily  folic acid 1 milliGRAM(s) Oral daily  multivitamin 1 Tablet(s) Oral daily  nystatin Powder 1 Application(s) Topical two times a day  rivaroxaban 20 milliGRAM(s) Oral with dinner  sodium chloride 0.65% Nasal 1 Spray(s) Both Nostrils every 3 hours PRN  thiamine 100 milliGRAM(s) Oral daily    	  VITAL SIGNS:  T(C): 36.1 (11-25-23 @ 06:10), Max: 36.7 (11-24-23 @ 20:32)  HR: 75 (11-25-23 @ 06:10) (62 - 75)  BP: 124/60 (11-25-23 @ 06:10) (106/59 - 144/64)  RR: 18 (11-25-23 @ 06:10) (18 - 18)  SpO2: 96% (11-25-23 @ 06:10) (88% - 100%)  Wt(kg): --    I&O's Summary    24 Nov 2023 07:01  -  25 Nov 2023 07:00  --------------------------------------------------------  IN: 840 mL / OUT: 0 mL / NET: 840 mL                                       PHYSICAL EXAM:  Appearance: Normal	  HEENT: Normal oral mucosa, PERRL, EOMI	  Neck: Supple, - JVD; No Carotid Bruit   Cardiovascular: Normal S1 S2, No murmurs  Respiratory: Lungs clear to auscultation  Gastrointestinal:  Soft, Non-tender, + BS	  Skin: No rashes, No ecchymoses, No cyanosis  Extremities: Normal range of motion, No clubbing, cyanosis or edema  Vascular: Peripheral pulses palpable 2+ bilaterally  Neurologic: Non-focal  Psychiatry: A & O x 3, Mood & affect appropriate    LABS:	 	                        13.2   6.11  )-----------( 145      ( 25 Nov 2023 06:33 )             42.3     11-25    138  |  102  |  21  ----------------------------<  125<H>  4.0   |  26  |  1.11    Ca    9.6      25 Nov 2023 06:33  Mg     1.8     11-25    TPro  6.2  /  Alb  3.4  /  TBili  1.4<H>  /  DBili  x   /  AST  25  /  ALT  20  /  AlkPhos  70  11-24

## 2023-11-25 NOTE — PROGRESS NOTE ADULT - PROBLEM SELECTOR PLAN 3
CIWA monitoring ordered for 2 glasses of vodka per day; last drink 11/20/23  - s/p 2x 1mg IVP Ativan 11/22 for CIWA >8  - D/w Hospitalist consult rec'd Librium taper however given concern for decreasing respiratory drive, will keep this as PRN not standing for now. CIWA improved 11/23 day w/ scoring 5's  - C/w MVI, Folate, Thiamine

## 2023-11-25 NOTE — PROGRESS NOTE ADULT - PROBLEM SELECTOR PLAN 2
-TTE 11/22/23 w/ Hyperdynamic LVSF, EF >75%, w/ cavity obliteration resulting in an intra-cavitary gradient of 66.00 mmHg w/ Valsalva maneuver, Mod AS/TR, PASP 116 mmHg.   -Limited TTE 11/24: Improved intracavitary gradients, mod AS, mod PH  -PLAN: Inc lopressor to 100 mg BID -TTE 11/22/23 w/ Hyperdynamic LVSF, EF >75%, w/ cavity obliteration resulting in an intra-cavitary gradient of 66.00 mmHg w/ Valsalva maneuver, Mod AS/TR, PASP 116 mmHg.   -Limited TTE 11/24: Improved intracavitary gradients, mod AS, mod PH  -c/w lopressor 100 mg BID. Structural following; possible cath Monday -TTE 11/22/23 w/ Hyperdynamic LVSF, EF >75%, w/ cavity obliteration resulting in an intra-cavitary gradient of 66.00 mmHg w/ Valsalva maneuver, Mod AS/TR, PASP 116 mmHg.   -Limited TTE 11/24: Improved intracavitary gradients, mod AS, mod PH  -c/w lopressor 100 mg BID. Structural following; RHC/LHC 11/27

## 2023-11-25 NOTE — GOALS OF CARE CONVERSATION - ADVANCED CARE PLANNING - CONVERSATION DETAILS
LACE > 11, GOC discussed with patient.  Discussion included but not limited to who is Health Care Proxy, Code status, MOLST, diagnosis, prognosis, treatment options, risks and benefits, comfort measures, pain management, Home Care.  Per Patient’s wishes she to be FULL CODE. Pt wishes to live long life. States her twin sister is very supportive and believes she is HCP.

## 2023-11-25 NOTE — PROGRESS NOTE ADULT - ASSESSMENT
77 y/o F, poor historian, w/ PMHx of HTN, HLD, AFib (on Xarelto), chronic venous stasis, DM T2, neuropathy, Hx of uterine CA who presented to [facility] ED endorsing 2 days of worsening SOB with minimal exertion; admitted to cardiology for further workup; course c/b new hypoxia and ETOH withdrawal.

## 2023-11-25 NOTE — CHART NOTE - NSCHARTNOTEFT_GEN_A_CORE
77 y/o female, poor historian, w/ PMHx of HTN, HLD, AFib (on Xarelto), chronic venous stasis, DM T2, neuropathy, Hx of uterine CA who presented to [facility] ED endorsing 2 days of worsening SOB with exertion, now to the point that she can only walk a few steps. She reports a chronic SOB issue, but the severity has acutely worsened. Pt also reporting she has been having to sleep on the couch propped up for about 6 months (+ orthopnea). Pt was seen by pulmonologist Dr. 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 Pt denies palpitations, chest pain, dizziness, syncope, abdominal pain, N/V, fever/chills, cough. Patient found to have RVP (+) for entero/rhinovirus.  Echo revealed significant valve disease (see results above) and elevated LVOT gradient.  SHD being consulted for procedural evaluation.        Plan:  Problem 1: AS/LVOT obstruction.    -Discussed with Dr. Woods, repeat echo with moderate AS, moderate pHTN. Plan for R and L heart cath monday with Dr. Woods in cath lab.   -Continue BB/hep gtt for now given cath  -will follow, discussed with primary team       Problem 2: HTN.  Treating w/ BB      Problem 3: HLD.  Statin as tolerated.        Problem 4: AFib. Heparin gtt since cath monday      I have reviewed clinical labs tests and reports, radiology tests and reports, as well as old patient medical records, and discussed with the refering physician. 77 y/o female, poor historian, w/ PMHx of HTN, HLD, AFib (on Xarelto), chronic venous stasis, DM T2, neuropathy, Hx of uterine CA who presented to [facility] ED endorsing 2 days of worsening SOB with exertion, now to the point that she can only walk a few steps. She reports a chronic SOB issue, but the severity has acutely worsened. Pt also reporting she has been having to sleep on the couch propped up for about 6 months (+ orthopnea). Pt was seen by pulmonologist Dr. 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 Pt denies palpitations, chest pain, dizziness, syncope, abdominal pain, N/V, fever/chills, cough. Patient found to have RVP (+) for entero/rhinovirus.  Echo revealed significant valve disease (see results above) and elevated LVOT gradient.  SHD being consulted for procedural evaluation.        Plan:  Problem 1: AS/LVOT obstruction.    -Discussed with Dr. Woods, repeat echo with moderate AS, moderate pHTN. Plan for R and L heart cath monday with Dr. Woods in cath lab.   -Continue BB/hep gtt for now given cath  -will follow, discussed with primary team       Problem 2: HTN.  Treating w/ BB      Problem 3: HLD.  Statin as tolerated.        Problem 4: AFib. Heparin gtt since cath monday      I have reviewed clinical labs tests and reports, radiology tests and reports, as well as old patient medical records, and discussed with the refering physician. 79 y/o female, poor historian, w/ PMHx of HTN, HLD, AFib (on Xarelto), chronic venous stasis, DM T2, neuropathy, Hx of uterine CA who presented to [facility] ED endorsing 2 days of worsening SOB with exertion, now to the point that she can only walk a few steps. She reports a chronic SOB issue, but the severity has acutely worsened. Pt also reporting she has been having to sleep on the couch propped up for about 6 months (+ orthopnea). Pt was seen by pulmonologist Dr. 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 Pt denies palpitations, chest pain, dizziness, syncope, abdominal pain, N/V, fever/chills, cough. Patient found to have RVP (+) for entero/rhinovirus.  Echo revealed significant valve disease (see results above) and elevated LVOT gradient.  SHD being consulted for procedural evaluation.        Plan:  Problem 1: AS/LVOT obstruction.    -Discussed with Dr. Woods, repeat echo with moderate AS, moderate pHTN. Plan for R and L heart cath monday with Dr. Woods in cath lab.   -Continue BB/hep gtt for now given cath  -will follow, discussed with primary team       Problem 2: HTN.  Treating w/ BB      Problem 3: HLD.  Statin as tolerated.        Problem 4: AFib. Heparin gtt since cath monday      I have reviewed clinical labs tests and reports, radiology tests and reports, as well as old patient medical records, and discussed with the refering physician.

## 2023-11-25 NOTE — PROGRESS NOTE ADULT - PROBLEM SELECTOR PLAN 5
- A1c 5.7%. Prediabetic;  Hold home Jardiance inpt.      #HTN (Hypertension)  - -140s.   - Home Nebivolol 10mg PO QD not on formulary. Uptitrating Lopressor as above.    #HLD (Hyperlipidemia)  - Lipid panel well controlled, c/w Atorvastatin 40mg PO QHS.    #Venous stasis wounds    - Wound care consulted, await/appreciate recs    N: DASH/TLC diet   E: Replete lytes PRN   P: DVT PPX: Xarelto  C: FULL CODE   Dispo: pending workup;  PT consult    Case discussed with Dr. Barbour, Medicine and Pulmonology. - A1c 5.7%. Prediabetic;  Hold home Jardiance inpt.    - Glucose checks    #HTN (Hypertension)  - -140s.   - Home Nebivolol 10mg PO QD not on formulary. Uptitrating Lopressor as above.    #HLD (Hyperlipidemia)  - Lipid panel well controlled, c/w Atorvastatin 40mg PO QHS.    #Venous stasis wounds    - Wound care consulted, await/appreciate recs    N: DASH/TLC diet   E: Replete lytes PRN   P: DVT PPX: Xarelto  C: FULL CODE   Dispo: pending workup;  PT consult    Case discussed with Dr. Barbour, Medicine and Pulmonology.

## 2023-11-25 NOTE — PROGRESS NOTE ADULT - NS ATTEST RISK PROBLEM GEN_ALL_CORE FT
Presence of two chronic medical issues and discussion of plan with the cardiology team.
Presence of two chronic medical issues and discussion of plan with the cardiology team.

## 2023-11-25 NOTE — PROGRESS NOTE ADULT - PROBLEM SELECTOR PLAN 1
-2/2 + enterovirus/rhinovirus, restrictive lung dz, HF  -O2: Weaned to 4L spO2 98%  -Pulm eval 4/2023 revealed mild restrictive disease due to spinal kyphosis.  Pulm rec'd outpt eval  -Diuretics: None as euvolemic  -Duonebs q6hrs PRN. OOBTC and incentive spirometry -2/2 + enterovirus/rhinovirus, restrictive lung dz, HF  -O2: Weaned off O2  -Pulm eval 4/2023 revealed mild restrictive disease due to spinal kyphosis.  Pulm rec'd outpt eval  -Diuretics: None as euvolemic  -Duonebs q6hrs PRN. OOBTC and incentive spirometry -2/2 + enterovirus/rhinovirus, restrictive lung dz, HF  -O2: Weaned off O2  -Pulm eval 4/2023 revealed mild restrictive disease due to spinal kyphosis. Pulm rec'd outpt eval  -Diuretics: None as euvolemic  -Duonebs q6hrs PRN. OOBTC and incentive spirometry

## 2023-11-26 LAB
ANION GAP SERPL CALC-SCNC: 9 MMOL/L — SIGNIFICANT CHANGE UP (ref 5–17)
ANION GAP SERPL CALC-SCNC: 9 MMOL/L — SIGNIFICANT CHANGE UP (ref 5–17)
APTT BLD: 103.2 SEC — HIGH (ref 24.5–35.6)
APTT BLD: 103.2 SEC — HIGH (ref 24.5–35.6)
APTT BLD: 139.5 SEC — CRITICAL HIGH (ref 24.5–35.6)
APTT BLD: 139.5 SEC — CRITICAL HIGH (ref 24.5–35.6)
APTT BLD: 174.6 SEC — CRITICAL HIGH (ref 24.5–35.6)
APTT BLD: 174.6 SEC — CRITICAL HIGH (ref 24.5–35.6)
APTT BLD: 83 SEC — HIGH (ref 24.5–35.6)
APTT BLD: 83 SEC — HIGH (ref 24.5–35.6)
BUN SERPL-MCNC: 22 MG/DL — SIGNIFICANT CHANGE UP (ref 7–23)
BUN SERPL-MCNC: 22 MG/DL — SIGNIFICANT CHANGE UP (ref 7–23)
CALCIUM SERPL-MCNC: 9.4 MG/DL — SIGNIFICANT CHANGE UP (ref 8.4–10.5)
CALCIUM SERPL-MCNC: 9.4 MG/DL — SIGNIFICANT CHANGE UP (ref 8.4–10.5)
CHLORIDE SERPL-SCNC: 101 MMOL/L — SIGNIFICANT CHANGE UP (ref 96–108)
CHLORIDE SERPL-SCNC: 101 MMOL/L — SIGNIFICANT CHANGE UP (ref 96–108)
CO2 SERPL-SCNC: 27 MMOL/L — SIGNIFICANT CHANGE UP (ref 22–31)
CO2 SERPL-SCNC: 27 MMOL/L — SIGNIFICANT CHANGE UP (ref 22–31)
CREAT SERPL-MCNC: 1.14 MG/DL — SIGNIFICANT CHANGE UP (ref 0.5–1.3)
CREAT SERPL-MCNC: 1.14 MG/DL — SIGNIFICANT CHANGE UP (ref 0.5–1.3)
EGFR: 49 ML/MIN/1.73M2 — LOW
EGFR: 49 ML/MIN/1.73M2 — LOW
GLUCOSE BLDC GLUCOMTR-MCNC: 111 MG/DL — HIGH (ref 70–99)
GLUCOSE BLDC GLUCOMTR-MCNC: 111 MG/DL — HIGH (ref 70–99)
GLUCOSE BLDC GLUCOMTR-MCNC: 118 MG/DL — HIGH (ref 70–99)
GLUCOSE BLDC GLUCOMTR-MCNC: 118 MG/DL — HIGH (ref 70–99)
GLUCOSE SERPL-MCNC: 135 MG/DL — HIGH (ref 70–99)
GLUCOSE SERPL-MCNC: 135 MG/DL — HIGH (ref 70–99)
HCT VFR BLD CALC: 40.4 % — SIGNIFICANT CHANGE UP (ref 34.5–45)
HCT VFR BLD CALC: 40.4 % — SIGNIFICANT CHANGE UP (ref 34.5–45)
HCT VFR BLD CALC: 41 % — SIGNIFICANT CHANGE UP (ref 34.5–45)
HCT VFR BLD CALC: 41 % — SIGNIFICANT CHANGE UP (ref 34.5–45)
HGB BLD-MCNC: 13 G/DL — SIGNIFICANT CHANGE UP (ref 11.5–15.5)
HGB BLD-MCNC: 13 G/DL — SIGNIFICANT CHANGE UP (ref 11.5–15.5)
HGB BLD-MCNC: 13.1 G/DL — SIGNIFICANT CHANGE UP (ref 11.5–15.5)
HGB BLD-MCNC: 13.1 G/DL — SIGNIFICANT CHANGE UP (ref 11.5–15.5)
INR BLD: 1.13 — SIGNIFICANT CHANGE UP (ref 0.85–1.18)
INR BLD: 1.13 — SIGNIFICANT CHANGE UP (ref 0.85–1.18)
MAGNESIUM SERPL-MCNC: 1.7 MG/DL — SIGNIFICANT CHANGE UP (ref 1.6–2.6)
MAGNESIUM SERPL-MCNC: 1.7 MG/DL — SIGNIFICANT CHANGE UP (ref 1.6–2.6)
MCHC RBC-ENTMCNC: 30.6 PG — SIGNIFICANT CHANGE UP (ref 27–34)
MCHC RBC-ENTMCNC: 30.6 PG — SIGNIFICANT CHANGE UP (ref 27–34)
MCHC RBC-ENTMCNC: 31.6 PG — SIGNIFICANT CHANGE UP (ref 27–34)
MCHC RBC-ENTMCNC: 31.6 PG — SIGNIFICANT CHANGE UP (ref 27–34)
MCHC RBC-ENTMCNC: 31.7 GM/DL — LOW (ref 32–36)
MCHC RBC-ENTMCNC: 31.7 GM/DL — LOW (ref 32–36)
MCHC RBC-ENTMCNC: 32.4 GM/DL — SIGNIFICANT CHANGE UP (ref 32–36)
MCHC RBC-ENTMCNC: 32.4 GM/DL — SIGNIFICANT CHANGE UP (ref 32–36)
MCV RBC AUTO: 96.5 FL — SIGNIFICANT CHANGE UP (ref 80–100)
MCV RBC AUTO: 96.5 FL — SIGNIFICANT CHANGE UP (ref 80–100)
MCV RBC AUTO: 97.3 FL — SIGNIFICANT CHANGE UP (ref 80–100)
MCV RBC AUTO: 97.3 FL — SIGNIFICANT CHANGE UP (ref 80–100)
NRBC # BLD: 0 /100 WBCS — SIGNIFICANT CHANGE UP (ref 0–0)
PLATELET # BLD AUTO: 134 K/UL — LOW (ref 150–400)
PLATELET # BLD AUTO: 134 K/UL — LOW (ref 150–400)
PLATELET # BLD AUTO: 143 K/UL — LOW (ref 150–400)
PLATELET # BLD AUTO: 143 K/UL — LOW (ref 150–400)
POTASSIUM SERPL-MCNC: 3.9 MMOL/L — SIGNIFICANT CHANGE UP (ref 3.5–5.3)
POTASSIUM SERPL-MCNC: 3.9 MMOL/L — SIGNIFICANT CHANGE UP (ref 3.5–5.3)
POTASSIUM SERPL-SCNC: 3.9 MMOL/L — SIGNIFICANT CHANGE UP (ref 3.5–5.3)
POTASSIUM SERPL-SCNC: 3.9 MMOL/L — SIGNIFICANT CHANGE UP (ref 3.5–5.3)
PROTHROM AB SERPL-ACNC: 12.8 SEC — SIGNIFICANT CHANGE UP (ref 9.5–13)
PROTHROM AB SERPL-ACNC: 12.8 SEC — SIGNIFICANT CHANGE UP (ref 9.5–13)
RBC # BLD: 4.15 M/UL — SIGNIFICANT CHANGE UP (ref 3.8–5.2)
RBC # BLD: 4.15 M/UL — SIGNIFICANT CHANGE UP (ref 3.8–5.2)
RBC # BLD: 4.25 M/UL — SIGNIFICANT CHANGE UP (ref 3.8–5.2)
RBC # BLD: 4.25 M/UL — SIGNIFICANT CHANGE UP (ref 3.8–5.2)
RBC # FLD: 19.5 % — HIGH (ref 10.3–14.5)
RBC # FLD: 19.5 % — HIGH (ref 10.3–14.5)
RBC # FLD: 19.6 % — HIGH (ref 10.3–14.5)
RBC # FLD: 19.6 % — HIGH (ref 10.3–14.5)
SODIUM SERPL-SCNC: 137 MMOL/L — SIGNIFICANT CHANGE UP (ref 135–145)
SODIUM SERPL-SCNC: 137 MMOL/L — SIGNIFICANT CHANGE UP (ref 135–145)
WBC # BLD: 4.54 K/UL — SIGNIFICANT CHANGE UP (ref 3.8–10.5)
WBC # BLD: 4.54 K/UL — SIGNIFICANT CHANGE UP (ref 3.8–10.5)
WBC # BLD: 4.71 K/UL — SIGNIFICANT CHANGE UP (ref 3.8–10.5)
WBC # BLD: 4.71 K/UL — SIGNIFICANT CHANGE UP (ref 3.8–10.5)
WBC # FLD AUTO: 4.54 K/UL — SIGNIFICANT CHANGE UP (ref 3.8–10.5)
WBC # FLD AUTO: 4.54 K/UL — SIGNIFICANT CHANGE UP (ref 3.8–10.5)
WBC # FLD AUTO: 4.71 K/UL — SIGNIFICANT CHANGE UP (ref 3.8–10.5)
WBC # FLD AUTO: 4.71 K/UL — SIGNIFICANT CHANGE UP (ref 3.8–10.5)

## 2023-11-26 PROCEDURE — 99232 SBSQ HOSP IP/OBS MODERATE 35: CPT

## 2023-11-26 RX ORDER — CHLORHEXIDINE GLUCONATE 213 G/1000ML
1 SOLUTION TOPICAL ONCE
Refills: 0 | Status: DISCONTINUED | OUTPATIENT
Start: 2023-11-26 | End: 2023-11-30

## 2023-11-26 RX ORDER — HEPARIN SODIUM 5000 [USP'U]/ML
6500 INJECTION INTRAVENOUS; SUBCUTANEOUS EVERY 6 HOURS
Refills: 0 | Status: DISCONTINUED | OUTPATIENT
Start: 2023-11-26 | End: 2023-11-27

## 2023-11-26 RX ORDER — LANOLIN ALCOHOL/MO/W.PET/CERES
5 CREAM (GRAM) TOPICAL AT BEDTIME
Refills: 0 | Status: DISCONTINUED | OUTPATIENT
Start: 2023-11-26 | End: 2023-11-30

## 2023-11-26 RX ORDER — HEPARIN SODIUM 5000 [USP'U]/ML
3000 INJECTION INTRAVENOUS; SUBCUTANEOUS EVERY 6 HOURS
Refills: 0 | Status: DISCONTINUED | OUTPATIENT
Start: 2023-11-26 | End: 2023-11-27

## 2023-11-26 RX ORDER — HEPARIN SODIUM 5000 [USP'U]/ML
INJECTION INTRAVENOUS; SUBCUTANEOUS
Qty: 25000 | Refills: 0 | Status: DISCONTINUED | OUTPATIENT
Start: 2023-11-26 | End: 2023-11-27

## 2023-11-26 RX ADMIN — ATORVASTATIN CALCIUM 40 MILLIGRAM(S): 80 TABLET, FILM COATED ORAL at 21:31

## 2023-11-26 RX ADMIN — Medication 3 MILLILITER(S): at 17:55

## 2023-11-26 RX ADMIN — HEPARIN SODIUM 1400 UNIT(S)/HR: 5000 INJECTION INTRAVENOUS; SUBCUTANEOUS at 10:16

## 2023-11-26 RX ADMIN — Medication 1 TABLET(S): at 12:24

## 2023-11-26 RX ADMIN — Medication 2000 UNIT(S): at 12:23

## 2023-11-26 RX ADMIN — Medication 1 MILLIGRAM(S): at 12:23

## 2023-11-26 RX ADMIN — Medication 5 MILLIGRAM(S): at 23:39

## 2023-11-26 RX ADMIN — PANTOPRAZOLE SODIUM 40 MILLIGRAM(S): 20 TABLET, DELAYED RELEASE ORAL at 06:02

## 2023-11-26 RX ADMIN — HEPARIN SODIUM 0 UNIT(S)/HR: 5000 INJECTION INTRAVENOUS; SUBCUTANEOUS at 16:38

## 2023-11-26 RX ADMIN — Medication 5 MILLIGRAM(S): at 04:19

## 2023-11-26 RX ADMIN — Medication 100 MILLIGRAM(S): at 06:02

## 2023-11-26 RX ADMIN — HEPARIN SODIUM 1400 UNIT(S)/HR: 5000 INJECTION INTRAVENOUS; SUBCUTANEOUS at 02:41

## 2023-11-26 RX ADMIN — PREGABALIN 1000 MICROGRAM(S): 225 CAPSULE ORAL at 12:23

## 2023-11-26 RX ADMIN — SENNA PLUS 2 TABLET(S): 8.6 TABLET ORAL at 21:31

## 2023-11-26 RX ADMIN — HEPARIN SODIUM 1100 UNIT(S)/HR: 5000 INJECTION INTRAVENOUS; SUBCUTANEOUS at 20:38

## 2023-11-26 RX ADMIN — HEPARIN SODIUM 1100 UNIT(S)/HR: 5000 INJECTION INTRAVENOUS; SUBCUTANEOUS at 17:49

## 2023-11-26 RX ADMIN — NYSTATIN CREAM 1 APPLICATION(S): 100000 CREAM TOPICAL at 17:53

## 2023-11-26 RX ADMIN — NYSTATIN CREAM 1 APPLICATION(S): 100000 CREAM TOPICAL at 06:02

## 2023-11-26 RX ADMIN — HEPARIN SODIUM 0 UNIT(S)/HR: 5000 INJECTION INTRAVENOUS; SUBCUTANEOUS at 08:00

## 2023-11-26 RX ADMIN — Medication 5 MILLIGRAM(S): at 01:16

## 2023-11-26 RX ADMIN — Medication 100 MILLIGRAM(S): at 17:52

## 2023-11-26 RX ADMIN — HEPARIN SODIUM 0 UNIT(S)/HR: 5000 INJECTION INTRAVENOUS; SUBCUTANEOUS at 09:41

## 2023-11-26 RX ADMIN — Medication 3 MILLILITER(S): at 09:46

## 2023-11-26 NOTE — PROGRESS NOTE ADULT - NSPROGADDITIONALINFOA_GEN_ALL_CORE
Attending Attestation:  I was physically present for the key portions of the evaluation and management (E/M) service provided.  I agree with the above history, physical, and plan which I have reviewed with the following edits/addendum:    78F w known parox Afib (on Xarelto), chronic venous stasis and lymphedema, DMT2, uterine Ca, who p/w worsening GREGORY and orthopnea found to have rhino/enterovirus URTI admitted for hypoxic resp failure. CXR w bilateral congestion and effusion. Initially in sinus, now in Afib. Reviewed TTE images 11/22 noted LV intracavitary gradient (66 mm hg) due to hyperdynamic systolic function with moderate AS, PASP ~116. Repeated echo 11/24 while on beta blockers: PASP 55 mm Hg, moderate AS, intracavitary gradient not well seen. RV dilated with normal function.    - tolerating metoprolol tartrate dose 100 bid with HRs in 50s without sx  - discussed plan with Dr Woods for L/RHC 11/27 to evaluate PH and valve dse. Suspect mixed PH etiology.  - avoiding dehydration due to intracav gradient. No IV diuretics. Legs are mildly edematous, w chronic skin changes but overall appears euvolemic.   - off O2 support, hypoxia likely from viral URTI + restrictive lung dse  - PT eval and encouraged to mobilize and use iincentive spirometer    Michael Barbour MD  Cardiology

## 2023-11-26 NOTE — PROGRESS NOTE ADULT - ASSESSMENT
79 y/o F, poor historian, w/ PMHx of HTN, HLD, AFib (on Xarelto), chronic venous stasis, DM T2, neuropathy, Hx of uterine CA who presented to [facility] ED endorsing 2 days of worsening SOB with minimal exertion; admitted to cardiology for further workup; course c/b new hypoxia and ETOH withdrawal.        79 y/o F, poor historian, w/ PMHx of HTN, HLD, AFib (on Xarelto), chronic venous stasis, DM T2, neuropathy, Hx of uterine CA who presented to [facility] ED endorsing 2 days of worsening SOB with minimal exertion; admitted to cardiology for further workup; course c/b new hypoxia and ETOH withdrawal. Plan for RHC/LHC 11/27.

## 2023-11-26 NOTE — PROGRESS NOTE ADULT - PROBLEM SELECTOR PLAN 4
Currently in NSR. Switch to heparin gtt in case of cath Currently in NSR. c/w heparin gtt in preparation of cath

## 2023-11-26 NOTE — PROGRESS NOTE ADULT - SUBJECTIVE AND OBJECTIVE BOX
Feeling better today, and not quite as winded while walking to the bathroom.  No chest pain or other new symptoms.     OVERNIGHT EVENTS: NAEO  Remaining ROS negative       PHYSICAL EXAM:    General: no acute distress, wearing nasal cannula, sitting up in bed eating lunch  HEENT: normocephalic, atraumatic, MMM  Cards: irr irr, systolic murmur  Pulm: no wheeze, slight bibasilar crackles but much improved  Ab: soft, nontender, nondistended, normoactive bowel sounds  Ext: hyperpigmentation of bilateral lower extremities, no lower extremity edema, warm and wel    VITAL SIGNS:  Vital Signs Last 24 Hrs  T(C): 36.4 (26 Nov 2023 12:00), Max: 36.8 (25 Nov 2023 15:34)  T(F): 97.6 (26 Nov 2023 12:00), Max: 98.2 (25 Nov 2023 15:34)  HR: 55 (26 Nov 2023 12:00) (55 - 74)  BP: 122/58 (26 Nov 2023 12:00) (120/57 - 146/67)  BP(mean): 88 (26 Nov 2023 06:01) (88 - 97)  RR: 18 (26 Nov 2023 12:00) (18 - 18)  SpO2: 95% (26 Nov 2023 12:00) (95% - 97%)    Parameters below as of 26 Nov 2023 12:00  Patient On (Oxygen Delivery Method): nasal cannula  O2 Flow (L/min): 2        MEDICATIONS:  MEDICATIONS  (STANDING):  albuterol/ipratropium for Nebulization 3 milliLiter(s) Nebulizer every 6 hours  atorvastatin 40 milliGRAM(s) Oral at bedtime  cholecalciferol 2000 Unit(s) Oral daily  cyanocobalamin 1000 MICROGram(s) Oral daily  folic acid 1 milliGRAM(s) Oral daily  heparin  Infusion.  Unit(s)/Hr (14 mL/Hr) IV Continuous <Continuous>  melatonin 5 milliGRAM(s) Oral at bedtime  metoprolol tartrate 100 milliGRAM(s) Oral two times a day  multivitamin 1 Tablet(s) Oral daily  nystatin Powder 1 Application(s) Topical two times a day  pantoprazole    Tablet 40 milliGRAM(s) Oral before breakfast  senna 2 Tablet(s) Oral at bedtime    MEDICATIONS  (PRN):  heparin   Injectable 6500 Unit(s) IV Push every 6 hours PRN For aPTT less than 40  heparin   Injectable 3000 Unit(s) IV Push every 6 hours PRN For aPTT between 40 - 57  sodium chloride 0.65% Nasal 1 Spray(s) Both Nostrils every 3 hours PRN Nasal Congestion  zaleplon 5 milliGRAM(s) Oral at bedtime PRN Insomnia      ALLERGIES:  Allergies    No Known Allergies    Intolerances        LABS:                        13.0   4.54  )-----------( 134      ( 26 Nov 2023 07:01 )             41.0     11-26    137  |  101  |  22  ----------------------------<  135<H>  3.9   |  27  |  1.14    Ca    9.4      26 Nov 2023 07:01  Mg     1.7     11-26      PT/INR - ( 26 Nov 2023 07:01 )   PT: 12.8 sec;   INR: 1.13          PTT - ( 26 Nov 2023 08:13 )  PTT:103.2 sec  Urinalysis Basic - ( 26 Nov 2023 07:01 )    Color: x / Appearance: x / SG: x / pH: x  Gluc: 135 mg/dL / Ketone: x  / Bili: x / Urobili: x   Blood: x / Protein: x / Nitrite: x   Leuk Esterase: x / RBC: x / WBC x   Sq Epi: x / Non Sq Epi: x / Bacteria: x      CAPILLARY BLOOD GLUCOSE      POCT Blood Glucose.: 111 mg/dL (26 Nov 2023 11:37)      RADIOLOGY & ADDITIONAL TESTS: Reviewed.

## 2023-11-26 NOTE — PROGRESS NOTE ADULT - SUBJECTIVE AND OBJECTIVE BOX
Cardiology PA Adult Progress Note    SUBJECTIVE ASSESSMENT:  	  MEDICATIONS:  metoprolol tartrate 100 milliGRAM(s) Oral two times a day      albuterol/ipratropium for Nebulization 3 milliLiter(s) Nebulizer every 6 hours    melatonin 5 milliGRAM(s) Oral at bedtime  zaleplon 5 milliGRAM(s) Oral at bedtime PRN    pantoprazole    Tablet 40 milliGRAM(s) Oral before breakfast  senna 2 Tablet(s) Oral at bedtime    atorvastatin 40 milliGRAM(s) Oral at bedtime    cholecalciferol 2000 Unit(s) Oral daily  cyanocobalamin 1000 MICROGram(s) Oral daily  folic acid 1 milliGRAM(s) Oral daily  heparin   Injectable 6500 Unit(s) IV Push every 6 hours PRN  heparin   Injectable 3000 Unit(s) IV Push every 6 hours PRN  heparin  Infusion.  Unit(s)/Hr IV Continuous <Continuous>  multivitamin 1 Tablet(s) Oral daily  nystatin Powder 1 Application(s) Topical two times a day  sodium chloride 0.65% Nasal 1 Spray(s) Both Nostrils every 3 hours PRN    	  VITAL SIGNS:  T(C): 36.3 (11-26-23 @ 08:30), Max: 36.8 (11-25-23 @ 15:34)  HR: 57 (11-26-23 @ 08:30) (55 - 74)  BP: 144/59 (11-26-23 @ 08:30) (120/57 - 146/67)  RR: 18 (11-26-23 @ 08:30) (18 - 18)  SpO2: 96% (11-26-23 @ 08:30) (93% - 97%)  Wt(kg): --    I&O's Summary    25 Nov 2023 07:01  -  26 Nov 2023 07:00  --------------------------------------------------------  IN: 220 mL / OUT: 0 mL / NET: 220 mL    26 Nov 2023 07:01  -  26 Nov 2023 10:07  --------------------------------------------------------  IN: 180 mL / OUT: 0 mL / NET: 180 mL                                           PHYSICAL EXAM:  Appearance: Normal	  HEENT: Normal oral mucosa, PERRL, EOMI	  Neck: Supple, + JVD/ - JVD; ___ Carotid Bruit   Cardiovascular: Normal S1 S2, No murmurs  Respiratory: Lungs clear to auscultation/Decreased Breath Sounds/No Rales, Rhonchi, Wheezing	  Gastrointestinal:  Soft, Non-tender, + BS	  Skin: No rashes, No ecchymoses, No cyanosis  Extremities: Normal range of motion, No clubbing, cyanosis or edema  Vascular: Peripheral pulses palpable 2+ bilaterally  Neurologic: Non-focal  Psychiatry: A & O x 3, Mood & affect appropriate    LABS:	 	                                  13.0   4.54  )-----------( 134      ( 26 Nov 2023 07:01 )             41.0     11-26    137  |  101  |  22  ----------------------------<  135<H>  3.9   |  27  |  1.14    Ca    9.4      26 Nov 2023 07:01  Mg     1.7     11-26      proBNP:   Lipid Profile:   HgA1c:   TSH:   PT/INR - ( 26 Nov 2023 07:01 )   PT: 12.8 sec;   INR: 1.13          PTT - ( 26 Nov 2023 08:13 )  PTT:103.2 sec Cardiology PA Adult Progress Note    SUBJECTIVE ASSESSMENT: Seen and examined. Pt reports she had no sleep last night and reports fatigue. SOB has improved. Denies CP, palpitations or any symptoms.  	  MEDICATIONS:  metoprolol tartrate 100 milliGRAM(s) Oral two times a day  albuterol/ipratropium for Nebulization 3 milliLiter(s) Nebulizer every 6 hours  melatonin 5 milliGRAM(s) Oral at bedtime  zaleplon 5 milliGRAM(s) Oral at bedtime PRN  pantoprazole    Tablet 40 milliGRAM(s) Oral before breakfast  senna 2 Tablet(s) Oral at bedtime  atorvastatin 40 milliGRAM(s) Oral at bedtime  cholecalciferol 2000 Unit(s) Oral daily  cyanocobalamin 1000 MICROGram(s) Oral daily  folic acid 1 milliGRAM(s) Oral daily  heparin   Injectable 6500 Unit(s) IV Push every 6 hours PRN  heparin   Injectable 3000 Unit(s) IV Push every 6 hours PRN  heparin  Infusion.  Unit(s)/Hr IV Continuous <Continuous>  multivitamin 1 Tablet(s) Oral daily  nystatin Powder 1 Application(s) Topical two times a day  sodium chloride 0.65% Nasal 1 Spray(s) Both Nostrils every 3 hours PRN    VITAL SIGNS:  T(C): 36.3 (11-26-23 @ 08:30), Max: 36.8 (11-25-23 @ 15:34)  HR: 57 (11-26-23 @ 08:30) (55 - 74)  BP: 144/59 (11-26-23 @ 08:30) (120/57 - 146/67)  RR: 18 (11-26-23 @ 08:30) (18 - 18)  SpO2: 96% (11-26-23 @ 08:30) (93% - 97%)  Wt(kg): --    I&O's Summary    25 Nov 2023 07:01  -  26 Nov 2023 07:00  --------------------------------------------------------  IN: 220 mL / OUT: 0 mL / NET: 220 mL    26 Nov 2023 07:01  -  26 Nov 2023 10:07  --------------------------------------------------------  IN: 180 mL / OUT: 0 mL / NET: 180 mL                                     PHYSICAL EXAM:  Appearance: Normal	  HEENT: Normal oral mucosa, PERRL, EOMI	  Neck: Supple,  - JVD; No Carotid Bruit   Cardiovascular: Normal S1 S2, No murmurs  Respiratory: Diminished at bases  Gastrointestinal:  Soft, Non-tender, + BS	  Skin: No rashes, No ecchymoses, No cyanosis  Extremities: Normal range of motion, No clubbing, cyanosis or edema  Vascular: Peripheral pulses palpable 2+ bilaterally  Neurologic: Non-focal  Psychiatry: A & O x 3, Mood & affect appropriate    LABS:	                         13.0   4.54  )-----------( 134      ( 26 Nov 2023 07:01 )             41.0     11-26    137  |  101  |  22  ----------------------------<  135<H>  3.9   |  27  |  1.14    Ca    9.4      26 Nov 2023 07:01  Mg     1.7     11-26    PT/INR - ( 26 Nov 2023 07:01 )   PT: 12.8 sec;   INR: 1.13          PTT - ( 26 Nov 2023 08:13 )  PTT:103.2 sec

## 2023-11-26 NOTE — PROGRESS NOTE ADULT - PROBLEM SELECTOR PLAN 2
-TTE 11/22/23 w/ Hyperdynamic LVSF, EF >75%, w/ cavity obliteration resulting in an intra-cavitary gradient of 66.00 mmHg w/ Valsalva maneuver, Mod AS/TR, PASP 116 mmHg.   -Limited TTE 11/24: Improved intracavitary gradients, mod AS, mod PH  -c/w lopressor 100 mg BID. Structural following; RHC/LHC 11/27

## 2023-11-26 NOTE — PROGRESS NOTE ADULT - PROBLEM SELECTOR PLAN 1
-2/2 + enterovirus/rhinovirus, restrictive lung dz, HF  -O2: Weaned off O2  -Pulm eval 4/2023 revealed mild restrictive disease due to spinal kyphosis. Pulm rec'd outpt eval  -Diuretics: None as euvolemic  -Duonebs q6hrs PRN. OOBTC and incentive spirometry -2/2 + enterovirus/rhinovirus, restrictive lung dz, HF  -O2: Weaned off O2  -Pulm eval 4/2023 revealed mild restrictive disease due to spinal kyphosis. Pulm rec'd outpt eval  -Diuretics: None  -Duonebs q6hrs PRN. OOBTC and incentive spirometry

## 2023-11-26 NOTE — PROVIDER CONTACT NOTE (CRITICAL VALUE NOTIFICATION) - ACTION/TREATMENT ORDERED:
As per nomogram, hold heparin drip for one hour and resume at a rate of 11/hr. Co-signed with second RNDebo.

## 2023-11-26 NOTE — PROGRESS NOTE ADULT - PROBLEM SELECTOR PLAN 5
- A1c 5.7%. Prediabetic;  Hold home Jardiance inpt.    - Glucose checks    #HTN (Hypertension)  - -140s.   - Home Nebivolol 10mg PO QD not on formulary. Uptitrating Lopressor as above.    #HLD (Hyperlipidemia)  - Lipid panel well controlled, c/w Atorvastatin 40mg PO QHS.    #Venous stasis wounds    - Wound care consulted, await/appreciate recs    N: DASH/TLC diet   E: Replete lytes PRN   P: DVT PPX: Xarelto  C: FULL CODE   Dispo: pending workup;  PT consult    Case discussed with Dr. Barbour, Medicine and Pulmonology. - A1c 5.7%. Prediabetic;  Hold home Jardiance inpt.    - Glucose checks    #HTN (Hypertension)  - -140s.   - Home Nebivolol 10mg PO QD not on formulary. c/w Lopressor as above.    #HLD (Hyperlipidemia)  - Lipid panel well controlled, c/w Atorvastatin 40mg PO QHS.    #Venous stasis wounds    - Wound care consulted, await/appreciate recs    N: DASH/TLC diet   E: Replete lytes PRN   P: DVT PPX: Xarelto  C: FULL CODE   Dispo: pending workup;  PT consult    Case discussed with Dr. Barbour, Medicine and Pulmonology.

## 2023-11-26 NOTE — PROVIDER CONTACT NOTE (CRITICAL VALUE NOTIFICATION) - ASSESSMENT
Patient is alert, oriented times four, denies any chest pain or SOB. Patient stable on 2L NC. Remaining VSS.

## 2023-11-27 LAB
ANION GAP SERPL CALC-SCNC: 11 MMOL/L — SIGNIFICANT CHANGE UP (ref 5–17)
ANION GAP SERPL CALC-SCNC: 11 MMOL/L — SIGNIFICANT CHANGE UP (ref 5–17)
APTT BLD: 79.5 SEC — HIGH (ref 24.5–35.6)
APTT BLD: 79.5 SEC — HIGH (ref 24.5–35.6)
BUN SERPL-MCNC: 17 MG/DL — SIGNIFICANT CHANGE UP (ref 7–23)
BUN SERPL-MCNC: 17 MG/DL — SIGNIFICANT CHANGE UP (ref 7–23)
CALCIUM SERPL-MCNC: 9.7 MG/DL — SIGNIFICANT CHANGE UP (ref 8.4–10.5)
CALCIUM SERPL-MCNC: 9.7 MG/DL — SIGNIFICANT CHANGE UP (ref 8.4–10.5)
CHLORIDE SERPL-SCNC: 101 MMOL/L — SIGNIFICANT CHANGE UP (ref 96–108)
CHLORIDE SERPL-SCNC: 101 MMOL/L — SIGNIFICANT CHANGE UP (ref 96–108)
CO2 SERPL-SCNC: 26 MMOL/L — SIGNIFICANT CHANGE UP (ref 22–31)
CO2 SERPL-SCNC: 26 MMOL/L — SIGNIFICANT CHANGE UP (ref 22–31)
COHGB MFR BLDA: 2.1 % — SIGNIFICANT CHANGE UP
COHGB MFR BLDA: 2.1 % — SIGNIFICANT CHANGE UP
COHGB MFR BLDV: 1.1 % — SIGNIFICANT CHANGE UP
COHGB MFR BLDV: 1.1 % — SIGNIFICANT CHANGE UP
CREAT SERPL-MCNC: 1.08 MG/DL — SIGNIFICANT CHANGE UP (ref 0.5–1.3)
CREAT SERPL-MCNC: 1.08 MG/DL — SIGNIFICANT CHANGE UP (ref 0.5–1.3)
EGFR: 52 ML/MIN/1.73M2 — LOW
EGFR: 52 ML/MIN/1.73M2 — LOW
GLUCOSE BLDC GLUCOMTR-MCNC: 106 MG/DL — HIGH (ref 70–99)
GLUCOSE BLDC GLUCOMTR-MCNC: 106 MG/DL — HIGH (ref 70–99)
GLUCOSE BLDC GLUCOMTR-MCNC: 108 MG/DL — HIGH (ref 70–99)
GLUCOSE BLDC GLUCOMTR-MCNC: 108 MG/DL — HIGH (ref 70–99)
GLUCOSE BLDC GLUCOMTR-MCNC: 96 MG/DL — SIGNIFICANT CHANGE UP (ref 70–99)
GLUCOSE BLDC GLUCOMTR-MCNC: 96 MG/DL — SIGNIFICANT CHANGE UP (ref 70–99)
GLUCOSE SERPL-MCNC: 125 MG/DL — HIGH (ref 70–99)
GLUCOSE SERPL-MCNC: 125 MG/DL — HIGH (ref 70–99)
HCT VFR BLD CALC: 39.2 % — SIGNIFICANT CHANGE UP (ref 34.5–45)
HCT VFR BLD CALC: 39.2 % — SIGNIFICANT CHANGE UP (ref 34.5–45)
HGB BLD CALC-MCNC: 12.4 G/DL — SIGNIFICANT CHANGE UP (ref 11.7–16.1)
HGB BLD CALC-MCNC: 12.4 G/DL — SIGNIFICANT CHANGE UP (ref 11.7–16.1)
HGB BLD-MCNC: 12.2 G/DL — SIGNIFICANT CHANGE UP (ref 11.5–15.5)
HGB BLD-MCNC: 12.2 G/DL — SIGNIFICANT CHANGE UP (ref 11.5–15.5)
HGB BLDA-MCNC: 12.1 G/DL — SIGNIFICANT CHANGE UP (ref 11.7–16.1)
HGB BLDA-MCNC: 12.1 G/DL — SIGNIFICANT CHANGE UP (ref 11.7–16.1)
MAGNESIUM SERPL-MCNC: 1.7 MG/DL — SIGNIFICANT CHANGE UP (ref 1.6–2.6)
MAGNESIUM SERPL-MCNC: 1.7 MG/DL — SIGNIFICANT CHANGE UP (ref 1.6–2.6)
MCHC RBC-ENTMCNC: 30 PG — SIGNIFICANT CHANGE UP (ref 27–34)
MCHC RBC-ENTMCNC: 30 PG — SIGNIFICANT CHANGE UP (ref 27–34)
MCHC RBC-ENTMCNC: 31.1 GM/DL — LOW (ref 32–36)
MCHC RBC-ENTMCNC: 31.1 GM/DL — LOW (ref 32–36)
MCV RBC AUTO: 96.6 FL — SIGNIFICANT CHANGE UP (ref 80–100)
MCV RBC AUTO: 96.6 FL — SIGNIFICANT CHANGE UP (ref 80–100)
METHGB MFR BLDA: 0 % — SIGNIFICANT CHANGE UP
METHGB MFR BLDA: 0 % — SIGNIFICANT CHANGE UP
METHGB MFR BLDV: 0.5 % — SIGNIFICANT CHANGE UP
METHGB MFR BLDV: 0.5 % — SIGNIFICANT CHANGE UP
NRBC # BLD: 0 /100 WBCS — SIGNIFICANT CHANGE UP (ref 0–0)
NRBC # BLD: 0 /100 WBCS — SIGNIFICANT CHANGE UP (ref 0–0)
OXYHGB MFR BLDA: 79 % — LOW (ref 90–95)
OXYHGB MFR BLDA: 79 % — LOW (ref 90–95)
PLATELET # BLD AUTO: 141 K/UL — LOW (ref 150–400)
PLATELET # BLD AUTO: 141 K/UL — LOW (ref 150–400)
POTASSIUM SERPL-MCNC: 3.9 MMOL/L — SIGNIFICANT CHANGE UP (ref 3.5–5.3)
POTASSIUM SERPL-MCNC: 3.9 MMOL/L — SIGNIFICANT CHANGE UP (ref 3.5–5.3)
POTASSIUM SERPL-SCNC: 3.9 MMOL/L — SIGNIFICANT CHANGE UP (ref 3.5–5.3)
POTASSIUM SERPL-SCNC: 3.9 MMOL/L — SIGNIFICANT CHANGE UP (ref 3.5–5.3)
RBC # BLD: 4.06 M/UL — SIGNIFICANT CHANGE UP (ref 3.8–5.2)
RBC # BLD: 4.06 M/UL — SIGNIFICANT CHANGE UP (ref 3.8–5.2)
RBC # FLD: 19.2 % — HIGH (ref 10.3–14.5)
RBC # FLD: 19.2 % — HIGH (ref 10.3–14.5)
SAO2 % BLDA: 80.6 % — LOW (ref 94–98)
SAO2 % BLDA: 80.6 % — LOW (ref 94–98)
SAO2 % BLDV: 47 % — LOW (ref 67–88)
SAO2 % BLDV: 47 % — LOW (ref 67–88)
SODIUM SERPL-SCNC: 138 MMOL/L — SIGNIFICANT CHANGE UP (ref 135–145)
SODIUM SERPL-SCNC: 138 MMOL/L — SIGNIFICANT CHANGE UP (ref 135–145)
WBC # BLD: 4.27 K/UL — SIGNIFICANT CHANGE UP (ref 3.8–10.5)
WBC # BLD: 4.27 K/UL — SIGNIFICANT CHANGE UP (ref 3.8–10.5)
WBC # FLD AUTO: 4.27 K/UL — SIGNIFICANT CHANGE UP (ref 3.8–10.5)
WBC # FLD AUTO: 4.27 K/UL — SIGNIFICANT CHANGE UP (ref 3.8–10.5)

## 2023-11-27 PROCEDURE — 99233 SBSQ HOSP IP/OBS HIGH 50: CPT | Mod: 25

## 2023-11-27 PROCEDURE — 93460 R&L HRT ART/VENTRICLE ANGIO: CPT | Mod: 26

## 2023-11-27 PROCEDURE — 99152 MOD SED SAME PHYS/QHP 5/>YRS: CPT

## 2023-11-27 PROCEDURE — 99232 SBSQ HOSP IP/OBS MODERATE 35: CPT | Mod: GC

## 2023-11-27 PROCEDURE — 99233 SBSQ HOSP IP/OBS HIGH 50: CPT

## 2023-11-27 RX ORDER — FUROSEMIDE 40 MG
20 TABLET ORAL
Refills: 0 | Status: DISCONTINUED | OUTPATIENT
Start: 2023-11-27 | End: 2023-11-29

## 2023-11-27 RX ORDER — RIVAROXABAN 15 MG-20MG
20 KIT ORAL
Refills: 0 | Status: DISCONTINUED | OUTPATIENT
Start: 2023-11-27 | End: 2023-11-30

## 2023-11-27 RX ADMIN — ATORVASTATIN CALCIUM 40 MILLIGRAM(S): 80 TABLET, FILM COATED ORAL at 22:36

## 2023-11-27 RX ADMIN — Medication 1 TABLET(S): at 11:35

## 2023-11-27 RX ADMIN — PREGABALIN 1000 MICROGRAM(S): 225 CAPSULE ORAL at 11:35

## 2023-11-27 RX ADMIN — NYSTATIN CREAM 1 APPLICATION(S): 100000 CREAM TOPICAL at 17:39

## 2023-11-27 RX ADMIN — Medication 5 MILLIGRAM(S): at 22:36

## 2023-11-27 RX ADMIN — Medication 3 MILLILITER(S): at 05:12

## 2023-11-27 RX ADMIN — Medication 3 MILLILITER(S): at 22:37

## 2023-11-27 RX ADMIN — Medication 100 MILLIGRAM(S): at 05:10

## 2023-11-27 RX ADMIN — PANTOPRAZOLE SODIUM 40 MILLIGRAM(S): 20 TABLET, DELAYED RELEASE ORAL at 05:10

## 2023-11-27 RX ADMIN — Medication 3 MILLILITER(S): at 17:41

## 2023-11-27 RX ADMIN — Medication 1 MILLIGRAM(S): at 11:35

## 2023-11-27 RX ADMIN — Medication 20 MILLIGRAM(S): at 14:54

## 2023-11-27 RX ADMIN — RIVAROXABAN 20 MILLIGRAM(S): KIT at 17:36

## 2023-11-27 RX ADMIN — Medication 100 MILLIGRAM(S): at 17:37

## 2023-11-27 RX ADMIN — Medication 2000 UNIT(S): at 11:34

## 2023-11-27 RX ADMIN — Medication 3 MILLILITER(S): at 11:37

## 2023-11-27 RX ADMIN — HEPARIN SODIUM 1100 UNIT(S)/HR: 5000 INJECTION INTRAVENOUS; SUBCUTANEOUS at 01:20

## 2023-11-27 NOTE — ADVANCED PRACTICE NURSE CONSULT - REASON FOR CONSULT
multiple skin concerns    Per chart review, 77 y/o F w/ atrial fibrillation on xarelto, HTN, HLD, DM2 with neuropathy and history of uterine cancer presenting with exertional dyspnea and admitted to the cardiology service for further workup.

## 2023-11-27 NOTE — PROGRESS NOTE ADULT - PROBLEM SELECTOR PLAN 2
-TTE 11/22/23 w/ Hyperdynamic LVSF, EF >75%, w/ cavity obliteration resulting in an intra-cavitary gradient of 66.00 mmHg w/ Valsalva maneuver, Mod AS/TR, PASP 116 mmHg.   -Limited TTE 11/24: Improved intracavitary gradients, mod AS, mod PH  -c/w lopressor 100 mg BID. Structural consulted now signed off as of 11/27 as RHC with no Severe AS or LVOT;   RHC/LHC w/ Kodra 11/27: Non obstrutive CAD, Elevated filling pressures, No Severe AS or LVOT -TTE 11/22/23 w/ Hyperdynamic LVSF, EF >75%, w/ cavity obliteration resulting in an intra-cavitary gradient of 66.00 mmHg w/ Valsalva maneuver, Mod AS/TR, PASP 116 mmHg.   -Limited TTE 11/24: Improved intracavitary gradients, mod AS, mod PH  -c/w lopressor 100 mg BID. Structural consulted now signed off as of 11/27 as RHC with no Severe AS or LVOT;   RHC/LHC w/ Kodra 11/27: Non obstructive CAD, Elevated filling pressures, No Severe AS or LVOT

## 2023-11-27 NOTE — ED ADULT TRIAGE NOTE - HEART RATE (BEATS/MIN)
66
Patient verbalized understanding. Obtained informed consent for treatment. Medical records, labs, and diagnotic tests reviewed. Patient had an opportunity to ask questions and address concerns. Patient was in agreement with treatment plan. Supportive therapy provided. .  Recommend Hydroxyzine 50mg PO Q6H PRN for anxiety. I certify that inpatient psychiatric hospital admission is medically necessary for treatment, which can be expected to improve the patient's condition, and/or for diagnostic study. Telepsychiatry will sign off. Thank you for allowing us to participate in the care of this patient. Please send message or call via [employer] if anything more is required. Electronically signed by RIO Luevano CNP on 11/26/2023 at 11:54 PM. Dianekuldip Loera, was evaluated through a synchronous (real-time) audio-video encounter. The patient (and/or guardian if applicable) is aware that this is a billable service, which includes applicable co-pays. This virtual visit was conducted with patient's (and/or legal guardian's) consent. Patient identification was verified, and a caregiver was present when appropriate. The patient was located at [facility] [address] [place] [address] Road [address] [place] [address]  Loc: [phone]  The provider was located at Home (City/State): [address] [place] [address] to [employer]  Consult performed by: RIO Luevano CNP  Consult ordered by: Alfredia Schwab, MD           Total time spent on this encounter: Not billed by time    --RIO Luevano CNP on 11/26/2023 at 11:53 PM    An electronic signature was used to authenticate this note.

## 2023-11-27 NOTE — PROGRESS NOTE ADULT - ASSESSMENT
79 y/o F w/ atrial fibrillation on xarelto, HTN, HLD, DM2 with neuropathy and history of uterine cancer presenting with exertional dyspnea and admitted to the cardiology service for further workup.     #Hypoxia  #Dyspnea  #Acute hypoxic respiratory failure  TTE 11/22/23 w/ Hyperdynamic LVSF, EF >75%,  w/ cavity obliteration resulting in an intra-cavitary gradient of 66.00 mmHg w/ Valsalva maneuver; mild LVH; Dilated RV. MARISOL. Mod AS/TR. Mild MS/MR.  PulmHTN PASP 116 mmHg.  - cont. IV Lasix per Cardiology, monitor volume status  - RVP+ Enterovirus, possibly contributing  - appreciate input from pulmonology:   Outpatient PFTs-moderate restrictive defect, with mildly reduced DLCO  - concern for precapillary pulmonary hypertension, and will need outpatient pulm follow up  - wean oxygen as able    #HTN  #HLD  - plan per primary team    #Alcohol withdrawal  - continue CIWA monitor  - not requiring any ativan    #Atrial fibrillation  - continue xarelto, metoprolol

## 2023-11-27 NOTE — PROGRESS NOTE ADULT - ASSESSMENT
77yo F w/ PMHx of HTN, HLD, AFib (on Xarelto), chronic venous stasis, DM T2, neuropathy, Hx of uterine CA who presented to [facility] ED endorsing 2 days of worsening SOB with exertion Pulmonary consulted given history of restrictive lung disease.    Data review:  Outpatient PFTs-moderate restrictive defect, with mildly reduced DLCO  Outpatient pulm: Dr. Silvestre  Outpatient inhaler: anoro  Home oxygen: none    Here with progressive GREGORY, exam with JVD, cardiac murmur and LE edema, elevated proBNP with CXR with mild congestion. Incidentally entero/rhino virus positive.  Repeat Echo with improved PASP. For R/LHC today and reportedly notable for elevated filling pressures. Her PFTs are due to kyphosis and would likely not contribute to her hypoxemia. Seen at bedside saturating 99% on 2L of nasal cannula.   -Diuresis per primary team  - Duonebs q6hr prn  - on Anoro at home, can reume on stiolto while here  - OOBTC and incentive spirometer  - Wean oxygen as tolerated   79yo F w/ PMHx of HTN, HLD, AFib (on Xarelto), chronic venous stasis, DM T2, neuropathy, Hx of uterine CA who presented to [facility] ED endorsing 2 days of worsening SOB with exertion Pulmonary consulted given history of restrictive lung disease.    Data review:  Outpatient PFTs-moderate restrictive defect, with mildly reduced DLCO  Outpatient pulm: Dr. Silvestre  Outpatient inhaler: anoro  Home oxygen: none    Here with progressive GREGORY, exam with JVD, cardiac murmur and LE edema, elevated proBNP with CXR with mild congestion. Incidentally entero/rhino virus positive.  Repeat Echo with improved PASP. For R/LHC today and reportedly notable for elevated filling pressures. Her PFTs are due to kyphosis and would likely not contribute to her hypoxemia. Seen at bedside saturating 99% on 2L of nasal cannula.   -Diuresis per primary team  - Duonebs q6hr prn  - on Anoro at home, can resume on stiolto while here  - OOBTC and incentive spirometer  - Wean oxygen as tolerated

## 2023-11-27 NOTE — PROGRESS NOTE ADULT - SUBJECTIVE AND OBJECTIVE BOX
INCOMPLETE    OVERNIGHT EVENTS:  No acute events overnight.    SUBJECTIVE / INTERVAL HPI: Patient seen and examined at bedside.    Denies CP, SOB, dizziness/lightheadedness, palpitations    12 point ROS otherwise negative    VITAL SIGNS:  Vital Signs Last 24 Hrs  T(C): 36.8 (27 Nov 2023 05:08), Max: 36.8 (27 Nov 2023 05:08)  T(F): 98.2 (27 Nov 2023 05:08), Max: 98.2 (27 Nov 2023 05:08)  HR: 60 (27 Nov 2023 05:08) (52 - 67)  BP: 162/63 (27 Nov 2023 05:08) (122/58 - 168/73)  BP(mean): --  RR: 18 (27 Nov 2023 05:08) (17 - 18)  SpO2: 93% (27 Nov 2023 05:08) (83% - 95%)    Parameters below as of 27 Nov 2023 05:08  Patient On (Oxygen Delivery Method): nasal cannula  O2 Flow (L/min): 2        I&O's Summary    26 Nov 2023 07:01  -  27 Nov 2023 07:00  --------------------------------------------------------  IN: 540 mL / OUT: 0 mL / NET: 540 mL          PHYSICAL EXAM:    General: sitting up in bed, NAD  HEENT: conjunctiva clear; MMM  Neck: supple, no JVD  Cardiovascular: RRR, no murmurs  Respiratory: CTA B/L  Gastrointestinal: soft, NT/ND, +BS  Extremities: WWP, no edema or cyanosis  Vascular: Peripheral pulses palpable 2+ bilaterally/ carotid 2+ b/l, no bruits; radial 2+ b/l; femoral 2+ b/l no bruits; DP/PT 2+ b/l  Neurological: AAOx3, no focal deficits    MEDICATIONS:  MEDICATIONS  (STANDING):  albuterol/ipratropium for Nebulization 3 milliLiter(s) Nebulizer every 6 hours  atorvastatin 40 milliGRAM(s) Oral at bedtime  chlorhexidine 4% Liquid 1 Application(s) Topical once  cholecalciferol 2000 Unit(s) Oral daily  cyanocobalamin 1000 MICROGram(s) Oral daily  folic acid 1 milliGRAM(s) Oral daily  heparin  Infusion.  Unit(s)/Hr (14 mL/Hr) IV Continuous <Continuous>  melatonin 5 milliGRAM(s) Oral at bedtime  metoprolol tartrate 100 milliGRAM(s) Oral two times a day  multivitamin 1 Tablet(s) Oral daily  nystatin Powder 1 Application(s) Topical two times a day  pantoprazole    Tablet 40 milliGRAM(s) Oral before breakfast  senna 2 Tablet(s) Oral at bedtime    MEDICATIONS  (PRN):  heparin   Injectable 3000 Unit(s) IV Push every 6 hours PRN For aPTT between 40 - 57  heparin   Injectable 6500 Unit(s) IV Push every 6 hours PRN For aPTT less than 40  sodium chloride 0.65% Nasal 1 Spray(s) Both Nostrils every 3 hours PRN Nasal Congestion  zaleplon 5 milliGRAM(s) Oral at bedtime PRN Insomnia      LABS:                        12.2   4.27  )-----------( 141      ( 27 Nov 2023 05:30 )             39.2       11-27    138  |  101  |  17  ----------------------------<  125<H>  3.9   |  26  |  1.08    Ca    9.7      27 Nov 2023 05:30  Mg     1.7     11-27        PT/INR - ( 26 Nov 2023 07:01 )   PT: 12.8 sec;   INR: 1.13          PTT - ( 26 Nov 2023 23:48 )  PTT:79.5 sec          TELEMETRY:    RADIOLOGY & ADDITIONAL TESTS: Reviewed. OVERNIGHT EVENTS:  No acute events overnight.    SUBJECTIVE / INTERVAL HPI: Patient seen and examined at bedside.    Denies CP, SOB, dizziness/lightheadedness, palpitations    12 point ROS otherwise negative    VITAL SIGNS:  Vital Signs Last 24 Hrs  T(C): 36.8 (27 Nov 2023 05:08), Max: 36.8 (27 Nov 2023 05:08)  T(F): 98.2 (27 Nov 2023 05:08), Max: 98.2 (27 Nov 2023 05:08)  HR: 60 (27 Nov 2023 05:08) (52 - 67)  BP: 162/63 (27 Nov 2023 05:08) (122/58 - 168/73)  BP(mean): --  RR: 18 (27 Nov 2023 05:08) (17 - 18)  SpO2: 93% (27 Nov 2023 05:08) (83% - 95%)    Parameters below as of 27 Nov 2023 05:08  Patient On (Oxygen Delivery Method): nasal cannula  O2 Flow (L/min): 2        I&O's Summary    26 Nov 2023 07:01  -  27 Nov 2023 07:00  --------------------------------------------------------  IN: 540 mL / OUT: 0 mL / NET: 540 mL          PHYSICAL EXAM:    General: sitting up in bed, NAD  HEENT: conjunctiva clear; MMM  Neck: supple, no JVD  Cardiovascular: RRR, no murmurs  Respiratory: CTA B/L  Gastrointestinal: soft, NT/ND, +BS  Extremities: WWP, no edema or cyanosis  Vascular: Peripheral pulses palpable 2+ bilaterally/ carotid 2+ b/l, no bruits; radial 2+ b/l; femoral 2+ b/l no bruits; DP/PT 2+ b/l  Neurological: AAOx3, no focal deficits    MEDICATIONS:  MEDICATIONS  (STANDING):  albuterol/ipratropium for Nebulization 3 milliLiter(s) Nebulizer every 6 hours  atorvastatin 40 milliGRAM(s) Oral at bedtime  chlorhexidine 4% Liquid 1 Application(s) Topical once  cholecalciferol 2000 Unit(s) Oral daily  cyanocobalamin 1000 MICROGram(s) Oral daily  folic acid 1 milliGRAM(s) Oral daily  heparin  Infusion.  Unit(s)/Hr (14 mL/Hr) IV Continuous <Continuous>  melatonin 5 milliGRAM(s) Oral at bedtime  metoprolol tartrate 100 milliGRAM(s) Oral two times a day  multivitamin 1 Tablet(s) Oral daily  nystatin Powder 1 Application(s) Topical two times a day  pantoprazole    Tablet 40 milliGRAM(s) Oral before breakfast  senna 2 Tablet(s) Oral at bedtime    MEDICATIONS  (PRN):  heparin   Injectable 3000 Unit(s) IV Push every 6 hours PRN For aPTT between 40 - 57  heparin   Injectable 6500 Unit(s) IV Push every 6 hours PRN For aPTT less than 40  sodium chloride 0.65% Nasal 1 Spray(s) Both Nostrils every 3 hours PRN Nasal Congestion  zaleplon 5 milliGRAM(s) Oral at bedtime PRN Insomnia      LABS:                        12.2   4.27  )-----------( 141      ( 27 Nov 2023 05:30 )             39.2       11-27    138  |  101  |  17  ----------------------------<  125<H>  3.9   |  26  |  1.08    Ca    9.7      27 Nov 2023 05:30  Mg     1.7     11-27        PT/INR - ( 26 Nov 2023 07:01 )   PT: 12.8 sec;   INR: 1.13          PTT - ( 26 Nov 2023 23:48 )  PTT:79.5 sec          TELEMETRY:    RADIOLOGY & ADDITIONAL TESTS: Reviewed.

## 2023-11-27 NOTE — PROGRESS NOTE ADULT - PROBLEM SELECTOR PLAN 1
-2/2 + enterovirus/rhinovirus, restrictive lung dz, HF  -O2: Now Weaned off O2  -Pulm eval 4/2023 revealed mild restrictive disease due to spinal kyphosis. Pulm rec'd outpt eval  -Diuretics:Restarted Lasix 20mg IV BID per RHC elevated wedge, with plan for diuresis and potential DC on 11/29   -Duonebs q6hrs PRN. OOBTC and incentive spirometry

## 2023-11-27 NOTE — PROGRESS NOTE ADULT - PROBLEM SELECTOR PLAN 5
- A1c 5.7%. Prediabetic;  Hold home Jardiance inpt.    - Glucose checks    #HTN (Hypertension)  - -140s.   - Home Nebivolol 10mg PO QD not on formulary. c/w Lopressor as above.    #HLD (Hyperlipidemia)  - Lipid panel well controlled, c/w Atorvastatin 40mg PO QHS.    #Venous stasis wounds    - Wound care consulted, await/appreciate recs    N: DASH/TLC diet   E: Replete lytes PRN   P: DVT PPX: Xarelto  C: FULL CODE   Dispo: pending workup;  PT consult    Case discussed with Dr. Barbuor, Medicine and Pulmonology. - A1c 5.7%. Prediabetic;  Hold home Jardiance inpt.    - Glucose checks    #HTN (Hypertension)  - -140s.   - Home Nebivolol 10mg PO QD not on formulary. c/w Lopressor as above.    #HLD (Hyperlipidemia)  - Lipid panel well controlled, c/w Atorvastatin 40mg PO QHS.    #Venous stasis wounds    - Wound care consulted, await/appreciate recs    N: DASH/TLC diet   E: Replete lytes PRN   P: DVT PPX: Xarelto  C: FULL CODE   Dispo: PT no needs     Case discussed with Dr. Mohan, reivewed Medicine and Pulmonology.

## 2023-11-27 NOTE — PROGRESS NOTE ADULT - ASSESSMENT
79 y/o F, poor historian, w/ PMHx of HTN, HLD, AFib (on Xarelto), chronic venous stasis, DM T2, neuropathy, Hx of uterine CA who presented to [facility] ED endorsing 2 days of worsening SOB with minimal exertion; admitted to cardiology for further workup; course c/b new hypoxia and ETOH withdrawal. Plan for RHC/LHC 11/27.       79 y/o F, poor historian, w/ PMHx of HTN, HLD, AFib (on Xarelto), chronic venous stasis, DM T2, neuropathy, Hx of uterine CA who presented to [facility] ED endorsing 2 days of worsening SOB with minimal exertion; admitted to cardiology for further workup; course c/b new hypoxia and ETOH withdrawal. s/p RHC/LHC with Kodra 11/27 non obstructive CAD, no severe AS or LVOT but elevated filling pressures restarted on IV Lasix with diuresis through 11/28

## 2023-11-27 NOTE — ADVANCED PRACTICE NURSE CONSULT - ASSESSMENT
Pt awake, alert and oriented. Donaldo 17. Able to turn in bed for assessment.     BLE intact with dry skin.   Bilateral gluteals intact with hyperpigmentation. No pressure injuries.  skin under pannus and breasts intact without irritation.

## 2023-11-27 NOTE — ADVANCED PRACTICE NURSE CONSULT - RECOMMEDATIONS
No topical wound care needs.    Continue routine skin cleansing/moisturizing, moisture management frequent repositioning and optimizing nutrition.    Please reconsult if new concerns arise . No topical wound care needs.    Cardiology team updated.    Continue routine skin cleansing/moisturizing, moisture management frequent repositioning and optimizing nutrition.    Please reconsult if new concerns arise .

## 2023-11-27 NOTE — PROGRESS NOTE ADULT - SUBJECTIVE AND OBJECTIVE BOX
PULMONARY CONSULT SERVICE FOLLOW-UP NOTE    INTERVAL HPI:  Reviewed chart and overnight events; patient seen and examined at bedside. No new acute complaints.    MEDICATIONS:  Pulmonary:  albuterol/ipratropium for Nebulization 3 milliLiter(s) Nebulizer every 6 hours    Antimicrobials:    Anticoagulants:    Cardiac:  metoprolol tartrate 100 milliGRAM(s) Oral two times a day      Allergies    No Known Allergies    Intolerances        Vital Signs Last 24 Hrs  T(C): 36.4 (27 Nov 2023 10:51), Max: 36.8 (27 Nov 2023 05:08)  T(F): 97.5 (27 Nov 2023 10:51), Max: 98.2 (27 Nov 2023 05:08)  HR: 52 (27 Nov 2023 10:51) (52 - 67)  BP: 137/60 (27 Nov 2023 10:51) (122/58 - 168/73)  BP(mean): --  RR: 18 (27 Nov 2023 10:51) (17 - 18)  SpO2: 99% (27 Nov 2023 10:51) (83% - 99%)    Parameters below as of 27 Nov 2023 10:51  Patient On (Oxygen Delivery Method): nasal cannula  O2 Flow (L/min): 2      11-26 @ 07:01 - 11-27 @ 07:00  --------------------------------------------------------  IN: 540 mL / OUT: 0 mL / NET: 540 mL    11-27 @ 07:01 - 11-27 @ 11:05  --------------------------------------------------------  IN: 0 mL / OUT: 0 mL / NET: 0 mL          PHYSICAL EXAM:  Constitutional: NAD  HEENT: MMM  Neck: supple  Cardiovascular: +S1/S2, +Mmurmur  Respiratory: No accessory muscle use  Gastrointestinal:  NT/ND  Extremities: WWP; no edema  Vascular: 2+ radial pulses B/L  Neurological: awake and alert; ROGER    LABS:  ABG - ( 27 Nov 2023 08:32 )  pH, Arterial: x     pH, Blood: x     /  pCO2: x     /  pO2: x     / HCO3: x     / Base Excess: x     /  SaO2: 80.6                CBC Full  -  ( 27 Nov 2023 05:30 )  WBC Count : 4.27 K/uL  RBC Count : 4.06 M/uL  Hemoglobin : 12.2 g/dL  Hematocrit : 39.2 %  Platelet Count - Automated : 141 K/uL  Mean Cell Volume : 96.6 fl  Mean Cell Hemoglobin : 30.0 pg  Mean Cell Hemoglobin Concentration : 31.1 gm/dL  Auto Neutrophil # : x  Auto Lymphocyte # : x  Auto Monocyte # : x  Auto Eosinophil # : x  Auto Basophil # : x  Auto Neutrophil % : x  Auto Lymphocyte % : x  Auto Monocyte % : x  Auto Eosinophil % : x  Auto Basophil % : x    11-27    138  |  101  |  17  ----------------------------<  125<H>  3.9   |  26  |  1.08    Ca    9.7      27 Nov 2023 05:30  Mg     1.7     11-27      PT/INR - ( 26 Nov 2023 07:01 )   PT: 12.8 sec;   INR: 1.13          PTT - ( 26 Nov 2023 23:48 )  PTT:79.5 sec      Urinalysis Basic - ( 27 Nov 2023 05:30 )    Color: x / Appearance: x / SG: x / pH: x  Gluc: 125 mg/dL / Ketone: x  / Bili: x / Urobili: x   Blood: x / Protein: x / Nitrite: x   Leuk Esterase: x / RBC: x / WBC x   Sq Epi: x / Non Sq Epi: x / Bacteria: x                RADIOLOGY & ADDITIONAL STUDIES:

## 2023-11-27 NOTE — PROGRESS NOTE ADULT - SUBJECTIVE AND OBJECTIVE BOX
Patient is a 78y old  Female who presents with a chief complaint of Acute hypoxic respiratory failure (2023 10:07)      INTERVAL HPI/OVERNIGHT EVENTS:    Pt. seen and examined earlier today  cath findings reviewed w/ Pt.  Pt. c/o chronic GREGORY  Pt. denies F/C, CP    Review of Systems: 12 point review of systems otherwise negative    MEDICATIONS  (STANDING):  albuterol/ipratropium for Nebulization 3 milliLiter(s) Nebulizer every 6 hours  atorvastatin 40 milliGRAM(s) Oral at bedtime  chlorhexidine 4% Liquid 1 Application(s) Topical once  cholecalciferol 2000 Unit(s) Oral daily  cyanocobalamin 1000 MICROGram(s) Oral daily  folic acid 1 milliGRAM(s) Oral daily  furosemide   Injectable 20 milliGRAM(s) IV Push two times a day  melatonin 5 milliGRAM(s) Oral at bedtime  metoprolol tartrate 100 milliGRAM(s) Oral two times a day  multivitamin 1 Tablet(s) Oral daily  nystatin Powder 1 Application(s) Topical two times a day  pantoprazole    Tablet 40 milliGRAM(s) Oral before breakfast  senna 2 Tablet(s) Oral at bedtime    MEDICATIONS  (PRN):  sodium chloride 0.65% Nasal 1 Spray(s) Both Nostrils every 3 hours PRN Nasal Congestion  zaleplon 5 milliGRAM(s) Oral at bedtime PRN Insomnia      Allergies    No Known Allergies    Intolerances          Vital Signs Last 24 Hrs  T(C): 36.4 (2023 10:51), Max: 36.8 (2023 05:08)  T(F): 97.5 (2023 10:51), Max: 98.2 (2023 05:08)  HR: 54 (2023 11:20) (52 - 67)  BP: 159/69 (2023 11:20) (137/60 - 168/73)  BP(mean): --  RR: 18 (2023 11:20) (17 - 18)  SpO2: 100% (2023 11:20) (93% - 100%)    Parameters below as of 2023 11:20  Patient On (Oxygen Delivery Method): nasal cannula  O2 Flow (L/min): 2    CAPILLARY BLOOD GLUCOSE      POCT Blood Glucose.: 96 mg/dL (2023 11:39)      11-26 @ 07:01  -   @ 07:00  --------------------------------------------------------  IN: 540 mL / OUT: 0 mL / NET: 540 mL     @ 07:01  -   @ 14:08  --------------------------------------------------------  IN: 0 mL / OUT: 0 mL / NET: 0 mL        Physical Exam:  (earlier today)  Daily     Daily Weight in k.6 (2023 06:28)  General:  comfortable-appearing in NAD, sitting in a chair  HEENT:  MMM  CV:  irregular S1S2, no JVD  Lungs:  CTA B/L, normal WOB on 2L NC  Abdomen:  soft NT ND  Extremities:  no edema B/L LE  Skin:  WWP  Neuro:  AAOx3    LABS:                        12.2   4.27  )-----------( 141      ( 2023 05:30 )             39.2         138  |  101  |  17  ----------------------------<  125<H>  3.9   |  26  |  1.08    Ca    9.7      2023 05:30  Mg     1.7           PT/INR - ( 2023 07:01 )   PT: 12.8 sec;   INR: 1.13          PTT - ( 2023 23:48 )  PTT:79.5 sec  Urinalysis Basic - ( 2023 05:30 )    Color: x / Appearance: x / SG: x / pH: x  Gluc: 125 mg/dL / Ketone: x  / Bili: x / Urobili: x   Blood: x / Protein: x / Nitrite: x   Leuk Esterase: x / RBC: x / WBC x   Sq Epi: x / Non Sq Epi: x / Bacteria: x

## 2023-11-27 NOTE — CHART NOTE - NSCHARTNOTEFT_GEN_A_CORE
77 y/o female, poor historian, w/ PMHx of HTN, HLD, AFib (on Xarelto), chronic venous stasis, DM T2, neuropathy, Hx of uterine CA who presented to [facility] ED endorsing 2 days of worsening SOB with exertion, now to the point that she can only walk a few steps. She reports a chronic SOB issue, but the severity has acutely worsened. Pt also reporting she has been having to sleep on the couch propped up for about 6 months (+ orthopnea). Pt was seen by pulmonologist Dr. 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 Pt denies palpitations, chest pain, dizziness, syncope, abdominal pain, N/V, fever/chills, cough. Patient found to have RVP (+) for entero/rhinovirus.  Echo revealed significant valve disease (see results above) and elevated LVOT gradient.  SHD consulted for procedural evaluation.        Plan:  Problem 1: AS/LVOT obstruction.    -Pt underwent cath this morning which showed no significant AS  -no need for structural heart intervention at this time, will sign off    Problem 2: HTN.  Treating w/ BB      Problem 3: HLD.  Statin as tolerated.            I have reviewed clinical labs tests and reports, radiology tests and reports, as well as old patient medical records, and discussed with the refering physician. 79 y/o female, poor historian, w/ PMHx of HTN, HLD, AFib (on Xarelto), chronic venous stasis, DM T2, neuropathy, Hx of uterine CA who presented to [facility] ED endorsing 2 days of worsening SOB with exertion, now to the point that she can only walk a few steps. She reports a chronic SOB issue, but the severity has acutely worsened. Pt also reporting she has been having to sleep on the couch propped up for about 6 months (+ orthopnea). Pt was seen by pulmonologist Dr. 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 Pt denies palpitations, chest pain, dizziness, syncope, abdominal pain, N/V, fever/chills, cough. Patient found to have RVP (+) for entero/rhinovirus.  Echo revealed significant valve disease (see results above) and elevated LVOT gradient.  SHD consulted for procedural evaluation.        Plan:  Problem 1: AS/LVOT obstruction.    -Pt underwent cath this morning which showed no significant AS  -no need for structural heart intervention at this time, will sign off    Problem 2: HTN.  Treating w/ BB      Problem 3: HLD.  Statin as tolerated.            I have reviewed clinical labs tests and reports, radiology tests and reports, as well as old patient medical records, and discussed with the refering physician. 77 y/o female, poor historian, w/ PMHx of HTN, HLD, AFib (on Xarelto), chronic venous stasis, DM T2, neuropathy, Hx of uterine CA who presented to [facility] ED endorsing 2 days of worsening SOB with exertion, now to the point that she can only walk a few steps. She reports a chronic SOB issue, but the severity has acutely worsened. Pt also reporting she has been having to sleep on the couch propped up for about 6 months (+ orthopnea). Pt was seen by pulmonologist Dr. 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 Pt denies palpitations, chest pain, dizziness, syncope, abdominal pain, N/V, fever/chills, cough. Patient found to have RVP (+) for entero/rhinovirus.  Echo revealed significant valve disease (see results above) and elevated LVOT gradient.  SHD consulted for procedural evaluation.        Plan:  Problem 1: AS/LVOT obstruction.    -Pt underwent cath this morning which showed no significant AS  -no need for structural heart intervention at this time, will sign off    Problem 2: HTN.  Treating w/ BB      Problem 3: HLD.  Statin as tolerated.            I have reviewed clinical labs tests and reports, radiology tests and reports, as well as old patient medical records, and discussed with the refering physician. 79 y/o female, poor historian, w/ PMHx of HTN, HLD, AFib (on Xarelto), chronic venous stasis, DM T2, neuropathy, Hx of uterine CA who presented to [facility] ED endorsing 2 days of worsening SOB with exertion, now to the point that she can only walk a few steps. She reports a chronic SOB issue, but the severity has acutely worsened. Pt also reporting she has been having to sleep on the couch propped up for about 6 months (+ orthopnea). Pt was seen by pulmonologist Dr. 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 Pt denies palpitations, chest pain, dizziness, syncope, abdominal pain, N/V, fever/chills, cough. Patient found to have RVP (+) for entero/rhinovirus.  Echo revealed significant valve disease (see results above) and elevated LVOT gradient.  SHD consulted for procedural evaluation.        Plan:  Problem 1: AS/LVOT obstruction.    -Pt underwent cath this morning which showed no significant AS  -no need for structural heart intervention at this time, will sign off    Problem 2: HTN.  Treating w/ BB      Problem 3: HLD.  Statin as tolerated.            I have reviewed clinical labs tests and reports, radiology tests and reports, as well as old patient medical records, and discussed with the refering physician.    Physical Exam on day of catheterization:     VSS  ELIGIO  CTA b/l  S1S2 of normal intensity; RRR; no sig murmurs appreciated  Abd is soft, nt, nd bs present  No edema appreciated in LE    Review of Systems:  General: fatigued  Eyes: Normal  Neck: Normal  GI: Normal  : Normal  Msk: Normal  Skin: Normal 79 y/o female, poor historian, w/ PMHx of HTN, HLD, AFib (on Xarelto), chronic venous stasis, DM T2, neuropathy, Hx of uterine CA who presented to [facility] ED endorsing 2 days of worsening SOB with exertion, now to the point that she can only walk a few steps. She reports a chronic SOB issue, but the severity has acutely worsened. Pt also reporting she has been having to sleep on the couch propped up for about 6 months (+ orthopnea). Pt was seen by pulmonologist Dr. 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 Pt denies palpitations, chest pain, dizziness, syncope, abdominal pain, N/V, fever/chills, cough. Patient found to have RVP (+) for entero/rhinovirus.  Echo revealed significant valve disease (see results above) and elevated LVOT gradient.  SHD consulted for procedural evaluation.        Plan:  Problem 1: AS/LVOT obstruction.    -Pt underwent cath this morning which showed no significant AS  -no need for structural heart intervention at this time, will sign off    Problem 2: HTN.  Treating w/ BB      Problem 3: HLD.  Statin as tolerated.            I have reviewed clinical labs tests and reports, radiology tests and reports, as well as old patient medical records, and discussed with the refering physician.    Physical Exam on day of catheterization:     VSS  ELIGIO  CTA b/l  S1S2 of normal intensity; RRR; no sig murmurs appreciated  Abd is soft, nt, nd bs present  No edema appreciated in LE    Review of Systems:  General: fatigued  Eyes: Normal  Neck: Normal  GI: Normal  : Normal  Msk: Normal  Skin: Normal 79 y/o female, poor historian, w/ PMHx of HTN, HLD, AFib (on Xarelto), chronic venous stasis, DM T2, neuropathy, Hx of uterine CA who presented to [facility] ED endorsing 2 days of worsening SOB with exertion, now to the point that she can only walk a few steps. She reports a chronic SOB issue, but the severity has acutely worsened. Pt also reporting she has been having to sleep on the couch propped up for about 6 months (+ orthopnea). Pt was seen by pulmonologist Dr. 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 Pt denies palpitations, chest pain, dizziness, syncope, abdominal pain, N/V, fever/chills, cough. Patient found to have RVP (+) for entero/rhinovirus.  Echo revealed significant valve disease (see results above) and elevated LVOT gradient.  SHD consulted for procedural evaluation.        Plan:  Problem 1: AS/LVOT obstruction.    -Pt underwent cath this morning which showed no significant AS  -no need for structural heart intervention at this time, will sign off    Problem 2: HTN.  Treating w/ BB      Problem 3: HLD.  Statin as tolerated.            I have reviewed clinical labs tests and reports, radiology tests and reports, as well as old patient medical records, and discussed with the refering physician.    Physical Exam on day of catheterization:     VSS  ELIGIO  CTA b/l  S1S2 of normal intensity; RRR; no sig murmurs appreciated  Abd is soft, nt, nd bs present  No edema appreciated in LE    Review of Systems:  General: fatigued  Eyes: Normal  Neck: Normal  GI: Normal  : Normal  Msk: Normal  Skin: Normal

## 2023-11-28 LAB
ANION GAP SERPL CALC-SCNC: 11 MMOL/L — SIGNIFICANT CHANGE UP (ref 5–17)
ANION GAP SERPL CALC-SCNC: 11 MMOL/L — SIGNIFICANT CHANGE UP (ref 5–17)
BUN SERPL-MCNC: 20 MG/DL — SIGNIFICANT CHANGE UP (ref 7–23)
BUN SERPL-MCNC: 20 MG/DL — SIGNIFICANT CHANGE UP (ref 7–23)
CALCIUM SERPL-MCNC: 10 MG/DL — SIGNIFICANT CHANGE UP (ref 8.4–10.5)
CALCIUM SERPL-MCNC: 10 MG/DL — SIGNIFICANT CHANGE UP (ref 8.4–10.5)
CHLORIDE SERPL-SCNC: 98 MMOL/L — SIGNIFICANT CHANGE UP (ref 96–108)
CHLORIDE SERPL-SCNC: 98 MMOL/L — SIGNIFICANT CHANGE UP (ref 96–108)
CO2 SERPL-SCNC: 28 MMOL/L — SIGNIFICANT CHANGE UP (ref 22–31)
CO2 SERPL-SCNC: 28 MMOL/L — SIGNIFICANT CHANGE UP (ref 22–31)
CREAT SERPL-MCNC: 1.18 MG/DL — SIGNIFICANT CHANGE UP (ref 0.5–1.3)
CREAT SERPL-MCNC: 1.18 MG/DL — SIGNIFICANT CHANGE UP (ref 0.5–1.3)
EGFR: 47 ML/MIN/1.73M2 — LOW
EGFR: 47 ML/MIN/1.73M2 — LOW
GLUCOSE BLDC GLUCOMTR-MCNC: 100 MG/DL — HIGH (ref 70–99)
GLUCOSE BLDC GLUCOMTR-MCNC: 100 MG/DL — HIGH (ref 70–99)
GLUCOSE BLDC GLUCOMTR-MCNC: 111 MG/DL — HIGH (ref 70–99)
GLUCOSE BLDC GLUCOMTR-MCNC: 111 MG/DL — HIGH (ref 70–99)
GLUCOSE BLDC GLUCOMTR-MCNC: 115 MG/DL — HIGH (ref 70–99)
GLUCOSE BLDC GLUCOMTR-MCNC: 115 MG/DL — HIGH (ref 70–99)
GLUCOSE SERPL-MCNC: 118 MG/DL — HIGH (ref 70–99)
GLUCOSE SERPL-MCNC: 118 MG/DL — HIGH (ref 70–99)
HCT VFR BLD CALC: 43.6 % — SIGNIFICANT CHANGE UP (ref 34.5–45)
HCT VFR BLD CALC: 43.6 % — SIGNIFICANT CHANGE UP (ref 34.5–45)
HGB BLD-MCNC: 13.8 G/DL — SIGNIFICANT CHANGE UP (ref 11.5–15.5)
HGB BLD-MCNC: 13.8 G/DL — SIGNIFICANT CHANGE UP (ref 11.5–15.5)
MAGNESIUM SERPL-MCNC: 1.8 MG/DL — SIGNIFICANT CHANGE UP (ref 1.6–2.6)
MAGNESIUM SERPL-MCNC: 1.8 MG/DL — SIGNIFICANT CHANGE UP (ref 1.6–2.6)
MCHC RBC-ENTMCNC: 30.6 PG — SIGNIFICANT CHANGE UP (ref 27–34)
MCHC RBC-ENTMCNC: 30.6 PG — SIGNIFICANT CHANGE UP (ref 27–34)
MCHC RBC-ENTMCNC: 31.7 GM/DL — LOW (ref 32–36)
MCHC RBC-ENTMCNC: 31.7 GM/DL — LOW (ref 32–36)
MCV RBC AUTO: 96.7 FL — SIGNIFICANT CHANGE UP (ref 80–100)
MCV RBC AUTO: 96.7 FL — SIGNIFICANT CHANGE UP (ref 80–100)
NRBC # BLD: 0 /100 WBCS — SIGNIFICANT CHANGE UP (ref 0–0)
NRBC # BLD: 0 /100 WBCS — SIGNIFICANT CHANGE UP (ref 0–0)
PLATELET # BLD AUTO: 146 K/UL — LOW (ref 150–400)
PLATELET # BLD AUTO: 146 K/UL — LOW (ref 150–400)
POTASSIUM SERPL-MCNC: 4.2 MMOL/L — SIGNIFICANT CHANGE UP (ref 3.5–5.3)
POTASSIUM SERPL-MCNC: 4.2 MMOL/L — SIGNIFICANT CHANGE UP (ref 3.5–5.3)
POTASSIUM SERPL-SCNC: 4.2 MMOL/L — SIGNIFICANT CHANGE UP (ref 3.5–5.3)
POTASSIUM SERPL-SCNC: 4.2 MMOL/L — SIGNIFICANT CHANGE UP (ref 3.5–5.3)
RBC # BLD: 4.51 M/UL — SIGNIFICANT CHANGE UP (ref 3.8–5.2)
RBC # BLD: 4.51 M/UL — SIGNIFICANT CHANGE UP (ref 3.8–5.2)
RBC # FLD: 19 % — HIGH (ref 10.3–14.5)
RBC # FLD: 19 % — HIGH (ref 10.3–14.5)
SODIUM SERPL-SCNC: 137 MMOL/L — SIGNIFICANT CHANGE UP (ref 135–145)
SODIUM SERPL-SCNC: 137 MMOL/L — SIGNIFICANT CHANGE UP (ref 135–145)
WBC # BLD: 5.24 K/UL — SIGNIFICANT CHANGE UP (ref 3.8–10.5)
WBC # BLD: 5.24 K/UL — SIGNIFICANT CHANGE UP (ref 3.8–10.5)
WBC # FLD AUTO: 5.24 K/UL — SIGNIFICANT CHANGE UP (ref 3.8–10.5)
WBC # FLD AUTO: 5.24 K/UL — SIGNIFICANT CHANGE UP (ref 3.8–10.5)

## 2023-11-28 PROCEDURE — 99233 SBSQ HOSP IP/OBS HIGH 50: CPT

## 2023-11-28 PROCEDURE — 99232 SBSQ HOSP IP/OBS MODERATE 35: CPT | Mod: GC

## 2023-11-28 RX ORDER — ZOLPIDEM TARTRATE 10 MG/1
5 TABLET ORAL ONCE
Refills: 0 | Status: DISCONTINUED | OUTPATIENT
Start: 2023-11-28 | End: 2023-11-28

## 2023-11-28 RX ADMIN — NYSTATIN CREAM 1 APPLICATION(S): 100000 CREAM TOPICAL at 17:59

## 2023-11-28 RX ADMIN — Medication 1 TABLET(S): at 10:30

## 2023-11-28 RX ADMIN — Medication 20 MILLIGRAM(S): at 22:14

## 2023-11-28 RX ADMIN — ATORVASTATIN CALCIUM 40 MILLIGRAM(S): 80 TABLET, FILM COATED ORAL at 22:14

## 2023-11-28 RX ADMIN — PANTOPRAZOLE SODIUM 40 MILLIGRAM(S): 20 TABLET, DELAYED RELEASE ORAL at 06:26

## 2023-11-28 RX ADMIN — Medication 3 MILLILITER(S): at 10:31

## 2023-11-28 RX ADMIN — Medication 20 MILLIGRAM(S): at 06:27

## 2023-11-28 RX ADMIN — Medication 1 MILLIGRAM(S): at 10:29

## 2023-11-28 RX ADMIN — Medication 3 MILLILITER(S): at 17:13

## 2023-11-28 RX ADMIN — RIVAROXABAN 20 MILLIGRAM(S): KIT at 17:10

## 2023-11-28 RX ADMIN — NYSTATIN CREAM 1 APPLICATION(S): 100000 CREAM TOPICAL at 06:26

## 2023-11-28 RX ADMIN — Medication 3 MILLILITER(S): at 22:14

## 2023-11-28 RX ADMIN — Medication 2000 UNIT(S): at 10:29

## 2023-11-28 RX ADMIN — Medication 100 MILLIGRAM(S): at 17:09

## 2023-11-28 RX ADMIN — ZOLPIDEM TARTRATE 5 MILLIGRAM(S): 10 TABLET ORAL at 01:08

## 2023-11-28 RX ADMIN — Medication 100 MILLIGRAM(S): at 06:26

## 2023-11-28 RX ADMIN — PREGABALIN 1000 MICROGRAM(S): 225 CAPSULE ORAL at 10:30

## 2023-11-28 NOTE — PROGRESS NOTE ADULT - SUBJECTIVE AND OBJECTIVE BOX
INCOMPLETE    OVERNIGHT EVENTS:  No acute events overnight.    SUBJECTIVE / INTERVAL HPI: Patient seen and examined at bedside.    Denies CP, SOB, dizziness/lightheadedness, palpitations    12 point ROS otherwise negative    VITAL SIGNS:  Vital Signs Last 24 Hrs  T(C): 37.1 (28 Nov 2023 12:13), Max: 37.1 (28 Nov 2023 12:13)  T(F): 98.8 (28 Nov 2023 12:13), Max: 98.8 (28 Nov 2023 12:13)  HR: 67 (28 Nov 2023 12:13) (61 - 68)  BP: 100/64 (28 Nov 2023 12:13) (100/64 - 142/64)  BP(mean): 92 (28 Nov 2023 06:21) (83 - 97)  RR: 14 (28 Nov 2023 12:13) (14 - 18)  SpO2: 99% (28 Nov 2023 12:13) (95% - 99%)    Parameters below as of 28 Nov 2023 12:13  Patient On (Oxygen Delivery Method): nasal cannula  O2 Flow (L/min): 1.5        I&O's Summary    27 Nov 2023 07:01  -  28 Nov 2023 07:00  --------------------------------------------------------  IN: 0 mL / OUT: 1250 mL / NET: -1250 mL    28 Nov 2023 07:01  -  28 Nov 2023 13:21  --------------------------------------------------------  IN: 180 mL / OUT: 300 mL / NET: -120 mL          PHYSICAL EXAM:    General: sitting up in bed, NAD  HEENT: conjunctiva clear; MMM  Neck: supple, no JVD  Cardiovascular: RRR, no murmurs  Respiratory: CTA B/L  Gastrointestinal: soft, NT/ND, +BS  Extremities: WWP, no edema or cyanosis  Vascular: Peripheral pulses palpable 2+ bilaterally/ carotid 2+ b/l, no bruits; radial 2+ b/l; femoral 2+ b/l no bruits; DP/PT 2+ b/l  Neurological: AAOx3, no focal deficits    MEDICATIONS:  MEDICATIONS  (STANDING):  albuterol/ipratropium for Nebulization 3 milliLiter(s) Nebulizer every 6 hours  atorvastatin 40 milliGRAM(s) Oral at bedtime  chlorhexidine 4% Liquid 1 Application(s) Topical once  cholecalciferol 2000 Unit(s) Oral daily  cyanocobalamin 1000 MICROGram(s) Oral daily  folic acid 1 milliGRAM(s) Oral daily  furosemide   Injectable 20 milliGRAM(s) IV Push two times a day  melatonin 5 milliGRAM(s) Oral at bedtime  metoprolol tartrate 100 milliGRAM(s) Oral two times a day  multivitamin 1 Tablet(s) Oral daily  nystatin Powder 1 Application(s) Topical two times a day  pantoprazole    Tablet 40 milliGRAM(s) Oral before breakfast  rivaroxaban 20 milliGRAM(s) Oral with dinner  senna 2 Tablet(s) Oral at bedtime    MEDICATIONS  (PRN):  sodium chloride 0.65% Nasal 1 Spray(s) Both Nostrils every 3 hours PRN Nasal Congestion  zaleplon 5 milliGRAM(s) Oral at bedtime PRN Insomnia      LABS:                        13.8   5.24  )-----------( 146      ( 28 Nov 2023 05:30 )             43.6       11-28    137  |  98  |  20  ----------------------------<  118<H>  4.2   |  28  |  1.18    Ca    10.0      28 Nov 2023 05:30  Mg     1.8     11-28        PTT - ( 26 Nov 2023 23:48 )  PTT:79.5 sec          TELEMETRY:    RADIOLOGY & ADDITIONAL TESTS: Reviewed. OVERNIGHT EVENTS:  No acute events overnight.    SUBJECTIVE / INTERVAL HPI: Patient seen and examined at bedside.    Denies CP, SOB, dizziness/lightheadedness, palpitations    12 point ROS otherwise negative    VITAL SIGNS:  Vital Signs Last 24 Hrs  T(C): 37.1 (28 Nov 2023 12:13), Max: 37.1 (28 Nov 2023 12:13)  T(F): 98.8 (28 Nov 2023 12:13), Max: 98.8 (28 Nov 2023 12:13)  HR: 67 (28 Nov 2023 12:13) (61 - 68)  BP: 100/64 (28 Nov 2023 12:13) (100/64 - 142/64)  BP(mean): 92 (28 Nov 2023 06:21) (83 - 97)  RR: 14 (28 Nov 2023 12:13) (14 - 18)  SpO2: 99% (28 Nov 2023 12:13) (95% - 99%)    Parameters below as of 28 Nov 2023 12:13  Patient On (Oxygen Delivery Method): nasal cannula  O2 Flow (L/min): 1.5        I&O's Summary    27 Nov 2023 07:01  -  28 Nov 2023 07:00  --------------------------------------------------------  IN: 0 mL / OUT: 1250 mL / NET: -1250 mL    28 Nov 2023 07:01  -  28 Nov 2023 13:21  --------------------------------------------------------  IN: 180 mL / OUT: 300 mL / NET: -120 mL          PHYSICAL EXAM:    General: sitting up in bed, NAD  HEENT: conjunctiva clear; MMM  Neck: supple, no JVD  Cardiovascular: RRR, no murmurs  Respiratory: CTA B/L  Gastrointestinal: soft, NT/ND, +BS  Extremities: WWP, no edema or cyanosis  Vascular: Peripheral pulses palpable 2+ bilaterally/ carotid 2+ b/l, no bruits; radial 2+ b/l; femoral 2+ b/l no bruits; DP/PT 2+ b/l  Neurological: AAOx3, no focal deficits    MEDICATIONS:  MEDICATIONS  (STANDING):  albuterol/ipratropium for Nebulization 3 milliLiter(s) Nebulizer every 6 hours  atorvastatin 40 milliGRAM(s) Oral at bedtime  chlorhexidine 4% Liquid 1 Application(s) Topical once  cholecalciferol 2000 Unit(s) Oral daily  cyanocobalamin 1000 MICROGram(s) Oral daily  folic acid 1 milliGRAM(s) Oral daily  furosemide   Injectable 20 milliGRAM(s) IV Push two times a day  melatonin 5 milliGRAM(s) Oral at bedtime  metoprolol tartrate 100 milliGRAM(s) Oral two times a day  multivitamin 1 Tablet(s) Oral daily  nystatin Powder 1 Application(s) Topical two times a day  pantoprazole    Tablet 40 milliGRAM(s) Oral before breakfast  rivaroxaban 20 milliGRAM(s) Oral with dinner  senna 2 Tablet(s) Oral at bedtime    MEDICATIONS  (PRN):  sodium chloride 0.65% Nasal 1 Spray(s) Both Nostrils every 3 hours PRN Nasal Congestion  zaleplon 5 milliGRAM(s) Oral at bedtime PRN Insomnia      LABS:                        13.8   5.24  )-----------( 146      ( 28 Nov 2023 05:30 )             43.6       11-28    137  |  98  |  20  ----------------------------<  118<H>  4.2   |  28  |  1.18    Ca    10.0      28 Nov 2023 05:30  Mg     1.8     11-28        PTT - ( 26 Nov 2023 23:48 )  PTT:79.5 sec          TELEMETRY:    RADIOLOGY & ADDITIONAL TESTS: Reviewed.

## 2023-11-28 NOTE — PROGRESS NOTE ADULT - SUBJECTIVE AND OBJECTIVE BOX
PULMONARY CONSULT SERVICE FOLLOW-UP NOTE    INTERVAL HPI:  Reviewed chart and overnight events; patient seen and examined at bedside. No new acute complaints.    MEDICATIONS:  Pulmonary:  albuterol/ipratropium for Nebulization 3 milliLiter(s) Nebulizer every 6 hours    Antimicrobials:    Anticoagulants:  rivaroxaban 20 milliGRAM(s) Oral with dinner    Cardiac:  furosemide   Injectable 20 milliGRAM(s) IV Push two times a day  metoprolol tartrate 100 milliGRAM(s) Oral two times a day      Allergies    No Known Allergies    Intolerances        Vital Signs Last 24 Hrs  T(C): 37.1 (28 Nov 2023 12:13), Max: 37.1 (28 Nov 2023 12:13)  T(F): 98.8 (28 Nov 2023 12:13), Max: 98.8 (28 Nov 2023 12:13)  HR: 67 (28 Nov 2023 12:13) (61 - 68)  BP: 100/64 (28 Nov 2023 12:13) (100/64 - 142/64)  BP(mean): 92 (28 Nov 2023 06:21) (83 - 97)  RR: 14 (28 Nov 2023 12:13) (14 - 18)  SpO2: 99% (28 Nov 2023 12:13) (95% - 99%)    Parameters below as of 28 Nov 2023 12:13  Patient On (Oxygen Delivery Method): nasal cannula  O2 Flow (L/min): 1.5      11-27 @ 07:01 - 11-28 @ 07:00  --------------------------------------------------------  IN: 0 mL / OUT: 1250 mL / NET: -1250 mL    11-28 @ 07:01 - 11-28 @ 14:15  --------------------------------------------------------  IN: 180 mL / OUT: 300 mL / NET: -120 mL        PHYSICAL EXAM:  Constitutional: NAD  HEENT: MMM  Neck: supple  Cardiovascular: +S1/S2, +Mmurmur  Respiratory: No accessory muscle use  Gastrointestinal:  NT/ND  Extremities: WWP; no edema  Vascular: 2+ radial pulses B/L  Neurological: awake and alert; ROGER      LABS:  ABG - ( 27 Nov 2023 08:32 )  pH, Arterial: x     pH, Blood: x     /  pCO2: x     /  pO2: x     / HCO3: x     / Base Excess: x     /  SaO2: 80.6                CBC Full  -  ( 28 Nov 2023 05:30 )  WBC Count : 5.24 K/uL  RBC Count : 4.51 M/uL  Hemoglobin : 13.8 g/dL  Hematocrit : 43.6 %  Platelet Count - Automated : 146 K/uL  Mean Cell Volume : 96.7 fl  Mean Cell Hemoglobin : 30.6 pg  Mean Cell Hemoglobin Concentration : 31.7 gm/dL  Auto Neutrophil # : x  Auto Lymphocyte # : x  Auto Monocyte # : x  Auto Eosinophil # : x  Auto Basophil # : x  Auto Neutrophil % : x  Auto Lymphocyte % : x  Auto Monocyte % : x  Auto Eosinophil % : x  Auto Basophil % : x    11-28    137  |  98  |  20  ----------------------------<  118<H>  4.2   |  28  |  1.18    Ca    10.0      28 Nov 2023 05:30  Mg     1.8     11-28      PTT - ( 26 Nov 2023 23:48 )  PTT:79.5 sec      Urinalysis Basic - ( 28 Nov 2023 05:30 )    Color: x / Appearance: x / SG: x / pH: x  Gluc: 118 mg/dL / Ketone: x  / Bili: x / Urobili: x   Blood: x / Protein: x / Nitrite: x   Leuk Esterase: x / RBC: x / WBC x   Sq Epi: x / Non Sq Epi: x / Bacteria: x                RADIOLOGY & ADDITIONAL STUDIES:

## 2023-11-28 NOTE — PROGRESS NOTE ADULT - PROBLEM SELECTOR PLAN 5
CM was informed by Amada Garcia at [facility] that pt is accepted pending authorization  CM called pt's brother Dr Mile Burgess to discuss same  Dr Mile Burgess prefers and is agreeable to bed at [facility]  CM called pt's insurance and spoke to [facility] who provided pending reference # [id number]  Awaiting call back from [facility] to provide clinical information  IMM reviewed with patient's caregiver   patient's caregiver agree with discharge determination - A1c 5.7%. Prediabetic;  Hold home Jardiance inpt.    - Glucose checks    #HTN (Hypertension)  - -140s.   - Home Nebivolol 10mg PO QD not on formulary. c/w Lopressor as above.    #HLD (Hyperlipidemia)  - Lipid panel well controlled, c/w Atorvastatin 40mg PO QHS.    #Venous stasis wounds    - Wound care consulted, await/appreciate recs    N: DASH/TLC diet   E: Replete lytes PRN   P: DVT PPX: Xarelto  C: FULL CODE   Dispo: PT no needs     Case discussed with Dr. Mohan, reivewed Medicine and Pulmonology.

## 2023-11-28 NOTE — PROGRESS NOTE ADULT - SUBJECTIVE AND OBJECTIVE BOX
Patient is a 78y old  Female who presents with a chief complaint of Acute hypoxic respiratory failure (2023 10:07)      INTERVAL HPI/OVERNIGHT EVENTS:    Pt. seen and examined earlier today  Pt. reports less GREGORY, approaching her baseline; is hoping to get OOB and ambulate w/ PT  Pt. denies F/C, CP  No new complaints    Review of Systems: 12 point review of systems otherwise negative    MEDICATIONS  (STANDING):  albuterol/ipratropium for Nebulization 3 milliLiter(s) Nebulizer every 6 hours  atorvastatin 40 milliGRAM(s) Oral at bedtime  chlorhexidine 4% Liquid 1 Application(s) Topical once  cholecalciferol 2000 Unit(s) Oral daily  cyanocobalamin 1000 MICROGram(s) Oral daily  folic acid 1 milliGRAM(s) Oral daily  furosemide   Injectable 20 milliGRAM(s) IV Push two times a day  melatonin 5 milliGRAM(s) Oral at bedtime  metoprolol tartrate 100 milliGRAM(s) Oral two times a day  multivitamin 1 Tablet(s) Oral daily  nystatin Powder 1 Application(s) Topical two times a day  pantoprazole    Tablet 40 milliGRAM(s) Oral before breakfast  rivaroxaban 20 milliGRAM(s) Oral with dinner  senna 2 Tablet(s) Oral at bedtime    MEDICATIONS  (PRN):  sodium chloride 0.65% Nasal 1 Spray(s) Both Nostrils every 3 hours PRN Nasal Congestion  zaleplon 5 milliGRAM(s) Oral at bedtime PRN Insomnia      Allergies    No Known Allergies    Intolerances          Vital Signs Last 24 Hrs  T(C): 37.1 (2023 12:13), Max: 37.1 (2023 12:13)  T(F): 98.8 (2023 12:13), Max: 98.8 (2023 12:13)  HR: 67 (2023 12:13) (64 - 68)  BP: 100/64 (2023 12:13) (100/64 - 142/64)  BP(mean): 92 (2023 06:21) (83 - 97)  RR: 14 (2023 12:13) (14 - 18)  SpO2: 99% (2023 12:13) (95% - 99%)    Parameters below as of 2023 12:13  Patient On (Oxygen Delivery Method): nasal cannula  O2 Flow (L/min): 1.5    CAPILLARY BLOOD GLUCOSE      POCT Blood Glucose.: 111 mg/dL (2023 11:50)  POCT Blood Glucose.: 115 mg/dL (2023 08:06)  POCT Blood Glucose.: 100 mg/dL (2023 06:37)  POCT Blood Glucose.: 108 mg/dL (2023 22:02)  POCT Blood Glucose.: 106 mg/dL (2023 16:59)       @ 07:  -   @ 07:00  --------------------------------------------------------  IN: 0 mL / OUT: 1250 mL / NET: -1250 mL     @ 07:01   @ 15:39  --------------------------------------------------------  IN: 180 mL / OUT: 300 mL / NET: -120 mL        Physical Exam:  (earlier today)  Daily     Daily Weight in k.2 (2023 06:20)  General:  comfortable-appearing in NAD  HEENT:  MMM  CV:  irregular S1S2, no JVD  Lungs:  CTA B/L, normal WOB on 2L NC  Abdomen:  soft NT ND  Extremities:  no edema B/L LE  Skin:  WWP  Neuro:  AAOx3    LABS:                        13.8   5.24  )-----------( 146      ( 2023 05:30 )             43.6         137  |  98  |  20  ----------------------------<  118<H>  4.2   |  28  |  1.18    Ca    10.0      2023 05:30  Mg     1.8           PTT - ( 2023 23:48 )  PTT:79.5 sec  Urinalysis Basic - ( 2023 05:30 )    Color: x / Appearance: x / SG: x / pH: x  Gluc: 118 mg/dL / Ketone: x  / Bili: x / Urobili: x   Blood: x / Protein: x / Nitrite: x   Leuk Esterase: x / RBC: x / WBC x   Sq Epi: x / Non Sq Epi: x / Bacteria: x

## 2023-11-28 NOTE — PROGRESS NOTE ADULT - PROBLEM SELECTOR PLAN 2
-TTE 11/22/23 w/ Hyperdynamic LVSF, EF >75%, w/ cavity obliteration resulting in an intra-cavitary gradient of 66.00 mmHg w/ Valsalva maneuver, Mod AS/TR, PASP 116 mmHg.   -Limited TTE 11/24: Improved intracavitary gradients, mod AS, mod PH  -c/w lopressor 100 mg BID. Structural consulted now signed off as of 11/27 as RHC with no Severe AS or LVOT;   RHC/LHC w/ Kodra 11/27: Non obstructive CAD, Elevated filling pressures, No Severe AS or LVOT

## 2023-11-28 NOTE — PROGRESS NOTE ADULT - ASSESSMENT
77yo F w/ PMHx of HTN, HLD, AFib (on Xarelto), chronic venous stasis, DM T2, neuropathy, Hx of uterine CA who presented to [facility] ED endorsing 2 days of worsening SOB with exertion Pulmonary consulted given history of restrictive lung disease.    Data review:  Outpatient PFTs-moderate restrictive defect, with mildly reduced DLCO  Outpatient pulm: Dr. Silvestre  Outpatient inhaler: anoro  Home oxygen: none    Here with progressive GREGORY, exam with JVD, cardiac murmur and LE edema, elevated proBNP with CXR with mild congestion. Incidentally entero/rhino virus positive.  Repeat Echo with improved PASP. S/p R and L heart cath with mean PA pressure > 40, and PCWP > 15 suggestive of group 2 pulmonary hypertension with elevated filling pressures. Her PFTs are due to kyphosis and would likely not contribute to her hypoxemia. Seen at bedside saturating 99% on 2L of nasal cannula.   - Agree with diuresis  - Duonebs q6hr prn  - on Anoro at home, can resume on stiolto while here  - OOBTC and incentive spirometer  - Wean oxygen as tolerated  - Will need follow up with Dr. Terrence Silvestre

## 2023-11-28 NOTE — PROGRESS NOTE ADULT - PROBLEM SELECTOR PLAN 1
-2/2 + enterovirus/rhinovirus, restrictive lung dz, HF  -O2: Now Weaned off O2  -Pulm eval 4/2023 revealed mild restrictive disease due to spinal kyphosis. Pulm rec'd outpt eval  -Diuretics:Restarted Lasix 20mg IV BID per RHC elevated wedge, with plan for diuresis and potential DC on 11/29   -Duonebs q6hrs PRN. OOBTC and incentive spirometry   Saturating well on 1-2 liters, with ambulation 88%, on 1 liter 99%

## 2023-11-28 NOTE — PROGRESS NOTE ADULT - ATTENDING COMMENTS
Agree w above, diuresis and can continue dual dilator as she takes outpt.
Agree w above, seen today shortly after cath, comfortable in bed. No new recs from us. D/w Dr Silvestre.
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 It si unknown if there is additional precapillary  would optimized left heard disease.  Presence of possible precapillary PH can be evaluate on outpatient basis.  Continue supportive management for viral URI.  If patient has episodes of desaturation with tachycardia of unclear etiology consider PE work up.

## 2023-11-28 NOTE — PROGRESS NOTE ADULT - ASSESSMENT
79 y/o F, poor historian, w/ PMHx of HTN, HLD, AFib (on Xarelto), chronic venous stasis, DM T2, neuropathy, Hx of uterine CA who presented to [facility] ED endorsing 2 days of worsening SOB with minimal exertion; admitted to cardiology for further workup; course c/b new hypoxia and ETOH withdrawal. s/p RHC/LHC with Kodra 11/27 non obstructive CAD, no severe AS or LVOT but elevated filling pressures restarted on IV Lasix with diuresis through 11/28 possible DC home

## 2023-11-28 NOTE — PROGRESS NOTE ADULT - ASSESSMENT
79 y/o F w/ atrial fibrillation on xarelto, HTN, HLD, DM2 with neuropathy and history of uterine cancer presenting with exertional dyspnea and admitted to the cardiology service for further workup.     #Hypoxia  #Dyspnea  #Acute hypoxic respiratory failure  TTE 11/22/23 w/ Hyperdynamic LVSF, EF >75%,  w/ cavity obliteration resulting in an intra-cavitary gradient of 66.00 mmHg w/ Valsalva maneuver; mild LVH; Dilated RV. MARISOL. Mod AS/TR. Mild MS/MR.  PulmHTN PASP 116 mmHg.  - cont. IV Lasix per Cardiology, monitor volume status  - RVP+ Enterovirus, possibly contributing  - appreciate input from pulmonology: on Anoro at home, can resume on stiolto while here  Outpatient PFTs-moderate restrictive defect, with mildly reduced DLCO  - concern for precapillary pulmonary hypertension, and will need outpatient pulm follow up  - wean oxygen as able    #HTN  #HLD  - plan per primary team    #Alcohol withdrawal  - continue CIWA monitor  - not requiring any ativan    #Atrial fibrillation  - continue xarelto, metoprolol

## 2023-11-29 ENCOUNTER — TRANSCRIPTION ENCOUNTER (OUTPATIENT)
Age: 78
End: 2023-11-29

## 2023-11-29 LAB
ANION GAP SERPL CALC-SCNC: 11 MMOL/L — SIGNIFICANT CHANGE UP (ref 5–17)
ANION GAP SERPL CALC-SCNC: 11 MMOL/L — SIGNIFICANT CHANGE UP (ref 5–17)
ANION GAP SERPL CALC-SCNC: 9 MMOL/L — SIGNIFICANT CHANGE UP (ref 5–17)
ANION GAP SERPL CALC-SCNC: 9 MMOL/L — SIGNIFICANT CHANGE UP (ref 5–17)
APPEARANCE UR: CLEAR — SIGNIFICANT CHANGE UP
APPEARANCE UR: CLEAR — SIGNIFICANT CHANGE UP
BILIRUB UR-MCNC: NEGATIVE — SIGNIFICANT CHANGE UP
BILIRUB UR-MCNC: NEGATIVE — SIGNIFICANT CHANGE UP
BUN SERPL-MCNC: 32 MG/DL — HIGH (ref 7–23)
BUN SERPL-MCNC: 32 MG/DL — HIGH (ref 7–23)
BUN SERPL-MCNC: 39 MG/DL — HIGH (ref 7–23)
BUN SERPL-MCNC: 39 MG/DL — HIGH (ref 7–23)
CALCIUM SERPL-MCNC: 10.2 MG/DL — SIGNIFICANT CHANGE UP (ref 8.4–10.5)
CHLORIDE SERPL-SCNC: 100 MMOL/L — SIGNIFICANT CHANGE UP (ref 96–108)
CHLORIDE SERPL-SCNC: 100 MMOL/L — SIGNIFICANT CHANGE UP (ref 96–108)
CHLORIDE SERPL-SCNC: 97 MMOL/L — SIGNIFICANT CHANGE UP (ref 96–108)
CHLORIDE SERPL-SCNC: 97 MMOL/L — SIGNIFICANT CHANGE UP (ref 96–108)
CO2 SERPL-SCNC: 27 MMOL/L — SIGNIFICANT CHANGE UP (ref 22–31)
CO2 SERPL-SCNC: 27 MMOL/L — SIGNIFICANT CHANGE UP (ref 22–31)
CO2 SERPL-SCNC: 31 MMOL/L — SIGNIFICANT CHANGE UP (ref 22–31)
CO2 SERPL-SCNC: 31 MMOL/L — SIGNIFICANT CHANGE UP (ref 22–31)
COLOR SPEC: YELLOW — SIGNIFICANT CHANGE UP
COLOR SPEC: YELLOW — SIGNIFICANT CHANGE UP
CREAT ?TM UR-MCNC: 145 MG/DL — SIGNIFICANT CHANGE UP
CREAT ?TM UR-MCNC: 145 MG/DL — SIGNIFICANT CHANGE UP
CREAT SERPL-MCNC: 1.52 MG/DL — HIGH (ref 0.5–1.3)
CREAT SERPL-MCNC: 1.52 MG/DL — HIGH (ref 0.5–1.3)
CREAT SERPL-MCNC: 1.71 MG/DL — HIGH (ref 0.5–1.3)
CREAT SERPL-MCNC: 1.71 MG/DL — HIGH (ref 0.5–1.3)
DIFF PNL FLD: NEGATIVE — SIGNIFICANT CHANGE UP
DIFF PNL FLD: NEGATIVE — SIGNIFICANT CHANGE UP
EGFR: 30 ML/MIN/1.73M2 — LOW
EGFR: 30 ML/MIN/1.73M2 — LOW
EGFR: 34 ML/MIN/1.73M2 — LOW
EGFR: 34 ML/MIN/1.73M2 — LOW
GLUCOSE BLDC GLUCOMTR-MCNC: 162 MG/DL — HIGH (ref 70–99)
GLUCOSE BLDC GLUCOMTR-MCNC: 162 MG/DL — HIGH (ref 70–99)
GLUCOSE BLDC GLUCOMTR-MCNC: 99 MG/DL — SIGNIFICANT CHANGE UP (ref 70–99)
GLUCOSE BLDC GLUCOMTR-MCNC: 99 MG/DL — SIGNIFICANT CHANGE UP (ref 70–99)
GLUCOSE SERPL-MCNC: 132 MG/DL — HIGH (ref 70–99)
GLUCOSE SERPL-MCNC: 132 MG/DL — HIGH (ref 70–99)
GLUCOSE SERPL-MCNC: 164 MG/DL — HIGH (ref 70–99)
GLUCOSE SERPL-MCNC: 164 MG/DL — HIGH (ref 70–99)
GLUCOSE UR QL: >=1000 MG/DL
GLUCOSE UR QL: >=1000 MG/DL
HCT VFR BLD CALC: 43 % — SIGNIFICANT CHANGE UP (ref 34.5–45)
HCT VFR BLD CALC: 43 % — SIGNIFICANT CHANGE UP (ref 34.5–45)
HGB BLD-MCNC: 13.5 G/DL — SIGNIFICANT CHANGE UP (ref 11.5–15.5)
HGB BLD-MCNC: 13.5 G/DL — SIGNIFICANT CHANGE UP (ref 11.5–15.5)
KETONES UR-MCNC: ABNORMAL MG/DL
KETONES UR-MCNC: ABNORMAL MG/DL
LEUKOCYTE ESTERASE UR-ACNC: NEGATIVE — SIGNIFICANT CHANGE UP
LEUKOCYTE ESTERASE UR-ACNC: NEGATIVE — SIGNIFICANT CHANGE UP
MAGNESIUM SERPL-MCNC: 2.2 MG/DL — SIGNIFICANT CHANGE UP (ref 1.6–2.6)
MAGNESIUM SERPL-MCNC: 2.2 MG/DL — SIGNIFICANT CHANGE UP (ref 1.6–2.6)
MCHC RBC-ENTMCNC: 30.3 PG — SIGNIFICANT CHANGE UP (ref 27–34)
MCHC RBC-ENTMCNC: 30.3 PG — SIGNIFICANT CHANGE UP (ref 27–34)
MCHC RBC-ENTMCNC: 31.4 GM/DL — LOW (ref 32–36)
MCHC RBC-ENTMCNC: 31.4 GM/DL — LOW (ref 32–36)
MCV RBC AUTO: 96.4 FL — SIGNIFICANT CHANGE UP (ref 80–100)
MCV RBC AUTO: 96.4 FL — SIGNIFICANT CHANGE UP (ref 80–100)
NITRITE UR-MCNC: NEGATIVE — SIGNIFICANT CHANGE UP
NITRITE UR-MCNC: NEGATIVE — SIGNIFICANT CHANGE UP
NRBC # BLD: 0 /100 WBCS — SIGNIFICANT CHANGE UP (ref 0–0)
NRBC # BLD: 0 /100 WBCS — SIGNIFICANT CHANGE UP (ref 0–0)
OSMOLALITY UR: 720 MOSM/KG — SIGNIFICANT CHANGE UP (ref 300–900)
OSMOLALITY UR: 720 MOSM/KG — SIGNIFICANT CHANGE UP (ref 300–900)
PH UR: 5.5 — SIGNIFICANT CHANGE UP (ref 5–8)
PH UR: 5.5 — SIGNIFICANT CHANGE UP (ref 5–8)
PLATELET # BLD AUTO: 176 K/UL — SIGNIFICANT CHANGE UP (ref 150–400)
PLATELET # BLD AUTO: 176 K/UL — SIGNIFICANT CHANGE UP (ref 150–400)
POTASSIUM SERPL-MCNC: 4.3 MMOL/L — SIGNIFICANT CHANGE UP (ref 3.5–5.3)
POTASSIUM SERPL-MCNC: 4.3 MMOL/L — SIGNIFICANT CHANGE UP (ref 3.5–5.3)
POTASSIUM SERPL-MCNC: 4.5 MMOL/L — SIGNIFICANT CHANGE UP (ref 3.5–5.3)
POTASSIUM SERPL-MCNC: 4.5 MMOL/L — SIGNIFICANT CHANGE UP (ref 3.5–5.3)
POTASSIUM SERPL-SCNC: 4.3 MMOL/L — SIGNIFICANT CHANGE UP (ref 3.5–5.3)
POTASSIUM SERPL-SCNC: 4.3 MMOL/L — SIGNIFICANT CHANGE UP (ref 3.5–5.3)
POTASSIUM SERPL-SCNC: 4.5 MMOL/L — SIGNIFICANT CHANGE UP (ref 3.5–5.3)
POTASSIUM SERPL-SCNC: 4.5 MMOL/L — SIGNIFICANT CHANGE UP (ref 3.5–5.3)
POTASSIUM UR-SCNC: 29 MMOL/L — SIGNIFICANT CHANGE UP
POTASSIUM UR-SCNC: 29 MMOL/L — SIGNIFICANT CHANGE UP
PROT ?TM UR-MCNC: 12 MG/DL — SIGNIFICANT CHANGE UP (ref 0–12)
PROT ?TM UR-MCNC: 12 MG/DL — SIGNIFICANT CHANGE UP (ref 0–12)
PROT UR-MCNC: NEGATIVE MG/DL — SIGNIFICANT CHANGE UP
PROT UR-MCNC: NEGATIVE MG/DL — SIGNIFICANT CHANGE UP
PROT/CREAT UR-RTO: 0.1 RATIO — SIGNIFICANT CHANGE UP (ref 0–0.2)
PROT/CREAT UR-RTO: 0.1 RATIO — SIGNIFICANT CHANGE UP (ref 0–0.2)
RBC # BLD: 4.46 M/UL — SIGNIFICANT CHANGE UP (ref 3.8–5.2)
RBC # BLD: 4.46 M/UL — SIGNIFICANT CHANGE UP (ref 3.8–5.2)
RBC # FLD: 19 % — HIGH (ref 10.3–14.5)
RBC # FLD: 19 % — HIGH (ref 10.3–14.5)
SODIUM SERPL-SCNC: 137 MMOL/L — SIGNIFICANT CHANGE UP (ref 135–145)
SODIUM SERPL-SCNC: 137 MMOL/L — SIGNIFICANT CHANGE UP (ref 135–145)
SODIUM SERPL-SCNC: 138 MMOL/L — SIGNIFICANT CHANGE UP (ref 135–145)
SODIUM SERPL-SCNC: 138 MMOL/L — SIGNIFICANT CHANGE UP (ref 135–145)
SODIUM UR-SCNC: 45 MMOL/L — SIGNIFICANT CHANGE UP
SODIUM UR-SCNC: 45 MMOL/L — SIGNIFICANT CHANGE UP
SP GR SPEC: >1.03 — HIGH (ref 1–1.03)
SP GR SPEC: >1.03 — HIGH (ref 1–1.03)
UROBILINOGEN FLD QL: 1 MG/DL — SIGNIFICANT CHANGE UP (ref 0.2–1)
UROBILINOGEN FLD QL: 1 MG/DL — SIGNIFICANT CHANGE UP (ref 0.2–1)
UUN UR-MCNC: 1035 MG/DL — SIGNIFICANT CHANGE UP
UUN UR-MCNC: 1035 MG/DL — SIGNIFICANT CHANGE UP
WBC # BLD: 6.59 K/UL — SIGNIFICANT CHANGE UP (ref 3.8–10.5)
WBC # BLD: 6.59 K/UL — SIGNIFICANT CHANGE UP (ref 3.8–10.5)
WBC # FLD AUTO: 6.59 K/UL — SIGNIFICANT CHANGE UP (ref 3.8–10.5)
WBC # FLD AUTO: 6.59 K/UL — SIGNIFICANT CHANGE UP (ref 3.8–10.5)

## 2023-11-29 PROCEDURE — 99233 SBSQ HOSP IP/OBS HIGH 50: CPT | Mod: GC

## 2023-11-29 PROCEDURE — 99232 SBSQ HOSP IP/OBS MODERATE 35: CPT

## 2023-11-29 RX ORDER — DAPAGLIFLOZIN 10 MG/1
10 TABLET, FILM COATED ORAL EVERY 24 HOURS
Refills: 0 | Status: DISCONTINUED | OUTPATIENT
Start: 2023-11-29 | End: 2023-11-30

## 2023-11-29 RX ORDER — SODIUM CHLORIDE 9 MG/ML
1000 INJECTION INTRAMUSCULAR; INTRAVENOUS; SUBCUTANEOUS
Refills: 0 | Status: DISCONTINUED | OUTPATIENT
Start: 2023-11-29 | End: 2023-11-30

## 2023-11-29 RX ORDER — DAPAGLIFLOZIN 10 MG/1
1 TABLET, FILM COATED ORAL
Qty: 30 | Refills: 0
Start: 2023-11-29 | End: 2023-12-28

## 2023-11-29 RX ORDER — ZOLPIDEM TARTRATE 10 MG/1
5 TABLET ORAL AT BEDTIME
Refills: 0 | Status: DISCONTINUED | OUTPATIENT
Start: 2023-11-29 | End: 2023-11-30

## 2023-11-29 RX ORDER — SODIUM CHLORIDE 9 MG/ML
250 INJECTION INTRAMUSCULAR; INTRAVENOUS; SUBCUTANEOUS ONCE
Refills: 0 | Status: COMPLETED | OUTPATIENT
Start: 2023-11-29 | End: 2023-11-29

## 2023-11-29 RX ADMIN — Medication 3 MILLILITER(S): at 23:16

## 2023-11-29 RX ADMIN — Medication 2000 UNIT(S): at 10:26

## 2023-11-29 RX ADMIN — RIVAROXABAN 20 MILLIGRAM(S): KIT at 18:06

## 2023-11-29 RX ADMIN — NYSTATIN CREAM 1 APPLICATION(S): 100000 CREAM TOPICAL at 06:15

## 2023-11-29 RX ADMIN — SODIUM CHLORIDE 250 MILLILITER(S): 9 INJECTION INTRAMUSCULAR; INTRAVENOUS; SUBCUTANEOUS at 10:26

## 2023-11-29 RX ADMIN — Medication 1 MILLIGRAM(S): at 10:26

## 2023-11-29 RX ADMIN — Medication 100 MILLIGRAM(S): at 18:07

## 2023-11-29 RX ADMIN — Medication 1 TABLET(S): at 10:26

## 2023-11-29 RX ADMIN — Medication 100 MILLIGRAM(S): at 06:16

## 2023-11-29 RX ADMIN — PANTOPRAZOLE SODIUM 40 MILLIGRAM(S): 20 TABLET, DELAYED RELEASE ORAL at 06:15

## 2023-11-29 RX ADMIN — PREGABALIN 1000 MICROGRAM(S): 225 CAPSULE ORAL at 10:26

## 2023-11-29 RX ADMIN — Medication 5 MILLIGRAM(S): at 22:15

## 2023-11-29 RX ADMIN — DAPAGLIFLOZIN 10 MILLIGRAM(S): 10 TABLET, FILM COATED ORAL at 10:26

## 2023-11-29 RX ADMIN — SENNA PLUS 2 TABLET(S): 8.6 TABLET ORAL at 22:15

## 2023-11-29 RX ADMIN — Medication 5 MILLIGRAM(S): at 02:29

## 2023-11-29 RX ADMIN — SODIUM CHLORIDE 75 MILLILITER(S): 9 INJECTION INTRAMUSCULAR; INTRAVENOUS; SUBCUTANEOUS at 16:27

## 2023-11-29 RX ADMIN — Medication 3 MILLILITER(S): at 06:15

## 2023-11-29 RX ADMIN — ATORVASTATIN CALCIUM 40 MILLIGRAM(S): 80 TABLET, FILM COATED ORAL at 22:15

## 2023-11-29 RX ADMIN — Medication 5 MILLIGRAM(S): at 00:49

## 2023-11-29 NOTE — DISCHARGE NOTE PROVIDER - CARE PROVIDER_API CALL
Michael Barbour  Cardiology  [address] [place] [address], [address]  [place] [address]  Phone: [phone]  Fax: [phone]  Scheduled Appointment: 12/07/2023 11:30 AM

## 2023-11-29 NOTE — DISCHARGE NOTE PROVIDER - ATTENDING ATTESTATION STATEMENT
I have personally seen and examined the patient. I have collaborated with and supervised the
0 = understands/communicates without difficulty

## 2023-11-29 NOTE — PROGRESS NOTE ADULT - NS ATTEND AMEND GEN_ALL_CORE FT
79 y/o F, poor historian, w/ PMHx of HTN, HLD, AFib (on Xarelto), chronic venous stasis, DM T2, neuropathy, Hx of uterine CA who presented to [facility] ED endorsing 2 days of worsening SOB with minimal exertion; admitted to cardiology for further workup; course c/b new hypoxia and ETOH withdrawal. s/p RHC/LHC with Kodra 11/27 non obstructive CAD, no severe AS or LVOT but elevated filling pressures restarted on IV Lasix with diuresis through 11/28    1. HFpEF  Normal LVEF, elevated PCWP with PHTN ---Continue lasix IV, add farxiga, probably euvolemic1-2 days, discharge planning.
79 y/o F, poor historian, w/ PMHx of HTN, HLD, AFib (on Xarelto), chronic venous stasis, DM T2, neuropathy, Hx of uterine CA who presented to [facility] ED endorsing 2 days of worsening SOB with minimal exertion; admitted to cardiology for further workup; course c/b new hypoxia and ETOH withdrawal. s/p RHC/LHC with Kodra 11/27 non obstructive CAD, no severe AS or LVOT but elevated filling pressures restarted on IV Lasix with diuresis through 11/28    1. HFpEF  Normal LVEF, elevated PCWP with PHTN , now dry with mild TED.  Stop IV lasix, 250 NS, postpone d/c for AM.
77 y/o F, poor historian, w/ PMHx of HTN, HLD, AFib (on Xarelto), chronic venous stasis, DM T2, neuropathy, Hx of uterine CA who presented to [facility] ED endorsing 2 days of worsening SOB with minimal exertion; admitted to cardiology for further workup; course c/b new hypoxia and ETOH withdrawal. s/p RHC/LHC with Kodra 11/27 non obstructive CAD, no severe AS or LVOT but elevated filling pressures restarted on IV Lasix with diuresis through 11/28    1. HFpEF  Normal LVEF, elevated PCWP with PHTN ---Continue lasix IV, add farxiga, probably euvolemic in 2days.

## 2023-11-29 NOTE — DISCHARGE NOTE PROVIDER - NSDCCPCAREPLAN_GEN_ALL_CORE_FT
PRINCIPAL DISCHARGE DIAGNOSIS  Diagnosis: Acute diastolic congestive heart failure  Assessment and Plan of Treatment: You were found to have Diastolic Heart failure meaning your heart and should continue. You should weigh yourself daily and if you notice a weight gain of more than 2-3 pounds in 2 days, shortness of breath, or lower extremity swelling you should contact your doctor immediately. Please restrict your fluid intake to 1-2L daily.  You were optimized on therapy and should continue with Farixga, Lopressor, Diuretic__  Please follow up with  ___ on      SECONDARY DISCHARGE DIAGNOSES  Diagnosis: HTN (hypertension)  Assessment and Plan of Treatment: You have a history of elevated blood pressure and you should continue your blood pressure medications as prescribed.    Diagnosis: Prediabetes  Assessment and Plan of Treatment: Your Hemoglobin A1c is ___ and your goal A1c is less than 7.0%. This number measures your average blood sugar level over the last three months.  Please continue ___ as listed for diabetes. Maintain a low carbohydrate, low sugar diet, exercise, monitor your fingerstick blood sugars regarly and follow up with your Endocrinologist/Primary Care Doctor.    Diagnosis: Valvular heart disease  Assessment and Plan of Treatment: You were found to have moderate narrowing of your aortic valve but underwent a heart cath that showed the valve disease was not severe not requiring any surgical intervention.    Diagnosis: AF (atrial fibrillation)  Assessment and Plan of Treatment: You have a history of Afib and should continue your anticoagulation with xarelto daily and continue with your rate control medication lopressor twice a day/     PRINCIPAL DISCHARGE DIAGNOSIS  Diagnosis: Acute diastolic congestive heart failure  Assessment and Plan of Treatment: You were found to have Diastolic Heart failure meaning your heart doesn't relax properly between each beat and as a result it doesn't adequately fill with blood. You should weigh yourself daily and if you notice a weight gain of more than 2-3 pounds in 2 days, shortness of breath, or lower extremity swelling you should contact your doctor immediately. Please restrict your fluid intake to 1-2L daily. You were optimized on therapy and should continue with Farixga, Lopressor, Diuretic__  Please follow up with  ___ on      SECONDARY DISCHARGE DIAGNOSES  Diagnosis: AF (atrial fibrillation)  Assessment and Plan of Treatment: You have a history of Afib and should continue your anticoagulation with xarelto daily and continue with your rate control medication lopressor twice a day.    Diagnosis: HTN (hypertension)  Assessment and Plan of Treatment: You have a history of elevated blood pressure and you should continue your blood pressure medications as prescribed.    Diagnosis: Prediabetes  Assessment and Plan of Treatment: Your Hemoglobin A1c is 5.7% and your goal A1c is less than 7.0%. This number measures your average blood sugar level over the last three months.  Please continue ____ as listed for diabetes. Maintain a low carbohydrate, low sugar diet, exercise, monitor your fingerstick blood sugars regarly and follow up with your Endocrinologist/Primary Care Doctor.    Diagnosis: Valvular heart disease  Assessment and Plan of Treatment: You were found to have moderate narrowing of your aortic valve but underwent a heart cath that showed the valve disease was not severe not requiring any surgical intervention.     PRINCIPAL DISCHARGE DIAGNOSIS  Diagnosis: Acute diastolic congestive heart failure  Assessment and Plan of Treatment: You were found to have Diastolic Heart failure meaning your heart doesn't relax properly between each beat and as a result it doesn't adequately fill with blood. You should weigh yourself daily and if you notice a weight gain of more than 2-3 pounds in 2 days, shortness of breath, or lower extremity swelling you should contact your doctor immediately. Please restrict your fluid intake to 1-2L daily. You were optimized on therapy and should continue with farixga 10mg daily and lopressor 100mg twice daily.  Please follow up with Dr. Barbour on 12/07 at 11:30 am at [address]      SECONDARY DISCHARGE DIAGNOSES  Diagnosis: AF (atrial fibrillation)  Assessment and Plan of Treatment: You have a history of Afib and should continue your anticoagulation with xarelto 20mg daily and continue with your rate control medication lopressor 100mg twice a day.    Diagnosis: HTN (hypertension)  Assessment and Plan of Treatment: You have a history of elevated blood pressure and you should continue your blood pressure medications as prescribed.    Diagnosis: Prediabetes  Assessment and Plan of Treatment: Your Hemoglobin A1c is 5.7% and your goal A1c is less than 7.0%. This number measures your average blood sugar level over the last three months.  Please continue jardiance as listed for diabetes. Maintain a low carbohydrate, low sugar diet, exercise, monitor your fingerstick blood sugars regarly and follow up with your Endocrinologist/Primary Care Doctor.    Diagnosis: Valvular heart disease  Assessment and Plan of Treatment: You were found to have moderate narrowing of your aortic valve but underwent a heart cath that showed the valve disease was not severe and did not require any surgical intervention.    Diagnosis: ETOH abuse  Assessment and Plan of Treatment: You need to stop drinking alcohol. Alcohol is bad for your heart and can exacerbate heart failure complications.    Diagnosis: Acute hypoxic respiratory failure  Assessment and Plan of Treatment: On admission you were found to have acute hypoxic respiratory failure due to a combination of factors including viral infections, a history of lung disease and heart failure. You were treated with diuretics, duoneb inhalers and oxygen. You've been weaned off oxygen and completed your course of medications. Please continue taking anoro upon discharge and follow up with your pulmonologist Dr. Silvestre.     PRINCIPAL DISCHARGE DIAGNOSIS  Diagnosis: Acute diastolic congestive heart failure  Assessment and Plan of Treatment: You were found to have Diastolic Heart failure meaning your heart doesn't relax properly between each beat and as a result it doesn't adequately fill with blood. You should weigh yourself daily and if you notice a weight gain of more than 2-3 pounds in 2 days, shortness of breath, or lower extremity swelling you should contact your doctor immediately. Please restrict your fluid intake to 1-2L daily. You were optimized on therapy and should continue with farxiga 10mg daily and lopressor 100mg twice daily.  You were not given water pill on discharge due to your worsening kidney function. Please repeat your labs outpatient to ensure kidney function continues to improve.   Please follow up with Dr. Barbour on 12/07 at 11:30 am at [address]      SECONDARY DISCHARGE DIAGNOSES  Diagnosis: AF (atrial fibrillation)  Assessment and Plan of Treatment: You have a history of Afib and should continue your anticoagulation with xarelto 20mg daily and continue with your rate control medication lopressor 100mg twice a day.    Diagnosis: HTN (hypertension)  Assessment and Plan of Treatment: You have a history of elevated blood pressure and you should continue your blood pressure medications as prescribed.    Diagnosis: Prediabetes  Assessment and Plan of Treatment: Your Hemoglobin A1c is 5.7% and your goal A1c is less than 7.0%. This number measures your average blood sugar level over the last three months.  Please continue jardiance as listed for diabetes. Maintain a low carbohydrate, low sugar diet, exercise, monitor your fingerstick blood sugars regarly and follow up with your Endocrinologist/Primary Care Doctor.    Diagnosis: Valvular heart disease  Assessment and Plan of Treatment: You were found to have moderate narrowing of your aortic valve but underwent a heart cath that showed the valve disease was not severe and did not require any surgical intervention. Follow up with your cardiologist for outpatient monitoring of valvular disease.    Diagnosis: ETOH abuse  Assessment and Plan of Treatment: You need to stop drinking alcohol. Alcohol is bad for your heart and can exacerbate heart failure complications.    Diagnosis: Acute hypoxic respiratory failure  Assessment and Plan of Treatment: On admission you were found to be short of breath due to low oxygen levels from a combination of factors including viral infections, a history of lung disease and heart failure. You were treated with diuretics, duoneb inhalers and oxygen. You've been weaned off oxygen and completed your course of medications. Please continue taking anoro upon discharge and follow up with your pulmonologist Dr. Silvestre.

## 2023-11-29 NOTE — DISCHARGE NOTE PROVIDER - ATTENDING DISCHARGE PHYSICAL EXAMINATION:
77 y/o F, poor historian, w/ PMHx of HTN, HLD, AFib (on Xarelto), chronic venous stasis, DM T2, neuropathy, Hx of uterine CA who presented to [facility] ED endorsing 2 days of worsening SOB with minimal exertion; admitted to cardiology for further workup; course c/b new hypoxia and ETOH withdrawal. s/p RHC/LHC with Kodra 11/27 non obstructive CAD, no severe AS or LVOT but elevated filling pressures restarted on IV Lasix with diuresis through 11/28    1. HFpEF  Normal LVEF, elevated PCWP with PHTN, diuresed, jung on farxiga and metoprolol.

## 2023-11-29 NOTE — PROGRESS NOTE ADULT - ASSESSMENT
79 y/o F, poor historian, w/ PMHx of HTN, HLD, AFib (on Xarelto), chronic venous stasis, DM T2, neuropathy, Hx of uterine CA who presented to [facility] ED endorsing 2 days of worsening SOB with minimal exertion; admitted to cardiology for further workup; course c/b new hypoxia and ETOH withdrawal. s/p RHC/LHC with Kodra 11/27 non obstructive CAD, no severe AS or LVOT but elevated filling pressures restarted on IV diuresis. Renal function worsened, IV lasix discontinued.

## 2023-11-29 NOTE — PROGRESS NOTE ADULT - ASSESSMENT
77 y/o F w/ atrial fibrillation on xarelto, HTN, HLD, DM2 with neuropathy and history of uterine cancer presenting with exertional dyspnea and admitted to the cardiology service for further workup.     #Hypoxia  #Dyspnea  #Acute hypoxic respiratory failure  TTE 11/22/23 w/ Hyperdynamic LVSF, EF >75%,  w/ cavity obliteration resulting in an intra-cavitary gradient of 66.00 mmHg w/ Valsalva maneuver; mild LVH; Dilated RV. MARISOL. Mod AS/TR. Mild MS/MR.  PulmHTN PASP 116 mmHg.  - improved s/p IV Lasix, monitor volume status  - RVP+ Enterovirus, possibly contributing  - appreciate input from pulmonology: on Anoro at home, can resume on stiolto while here  Outpatient PFTs-moderate restrictive defect, with mildly reduced DLCO  - concern for precapillary pulmonary hypertension, and will need outpatient pulm follow up  - wean oxygen as able; ambulatory O2 sats >88%    #TED  - likely due to Lasix; Pt. not presently edematous, plan for trial of judicious IVF per Cardiology; Lasix d/c'ed; PO intake encouraged  - can check UA and urine lytes (to calculate FeUrea) to better characterize  - monitor BMP  - renally-dose rx    #HTN  #HLD  - plan per primary team    #Alcohol withdrawal  - continue CIWA monitor  - not requiring any ativan    #Atrial fibrillation  - continue xarelto, metoprolol

## 2023-11-29 NOTE — PROGRESS NOTE ADULT - REASON FOR ADMISSION
Acute hypoxic respiratory failure
SOB
Acute hypoxic respiratory failure
Acute hypoxic respiratory failure, CHF exacerbation
Acute hypoxic respiratory failure

## 2023-11-29 NOTE — DISCHARGE NOTE PROVIDER - NSDCFUSCHEDAPPT_GEN_ALL_CORE_FT
Michael Barbour  [facility] Physician [facility]  HEARTVASC [address]  Scheduled Appointment: 12/07/2023

## 2023-11-29 NOTE — PROGRESS NOTE ADULT - PROBLEM SELECTOR PROBLEM 1
Acute hypoxic respiratory failure
SOB (shortness of breath)
Acute hypoxic respiratory failure

## 2023-11-29 NOTE — DISCHARGE NOTE PROVIDER - NSDCMRMEDTOKEN_GEN_ALL_CORE_FT
atorvastatin 40 mg oral tablet: 1 tab(s) orally once a day (at bedtime)  Jardiance 10 mg oral tablet: 1 tab(s) orally  Lunesta 3 mg oral tablet: 1 tab(s) orally once a day (at bedtime)  nebivolol 10 mg oral tablet: 1 tab(s) orally once a day  omeprazole 20 mg oral delayed release capsule: 1 cap(s) orally once a day  Vitamin B-12 1000 mcg oral tablet: 1 tab(s) orally once a day  Vitamin D3 2000 intl units oral tablet: 1 tab(s) orally once a day  Xarelto 20 mg oral tablet: 1 tab(s) orally once a day (in the evening)   atorvastatin 40 mg oral tablet: 1 tab(s) orally once a day (at bedtime)  Farxiga 10 mg oral tablet: 1 tab(s) orally once a day  Jardiance 10 mg oral tablet: 1 tab(s) orally  Lunesta 3 mg oral tablet: 1 tab(s) orally once a day (at bedtime)  nebivolol 10 mg oral tablet: 1 tab(s) orally once a day  omeprazole 20 mg oral delayed release capsule: 1 cap(s) orally once a day  Vitamin B-12 1000 mcg oral tablet: 1 tab(s) orally once a day  Vitamin D3 2000 intl units oral tablet: 1 tab(s) orally once a day  Xarelto 20 mg oral tablet: 1 tab(s) orally once a day (in the evening)   atorvastatin 40 mg oral tablet: 1 tab(s) orally once a day (at bedtime)  Farxiga 10 mg oral tablet: 1 tab(s) orally every 24 hours  Lunesta 3 mg oral tablet: 1 tab(s) orally once a day (at bedtime)  Metoprolol Tartrate 100 mg oral tablet: 1 tab(s) orally 2 times a day  omeprazole 20 mg oral delayed release capsule: 1 cap(s) orally once a day  Vitamin B-12 1000 mcg oral tablet: 1 tab(s) orally once a day  Vitamin D3 2000 intl units oral tablet: 1 tab(s) orally once a day  Xarelto 20 mg oral tablet: 1 tab(s) orally once a day (in the evening)

## 2023-11-29 NOTE — PROGRESS NOTE ADULT - PROBLEM SELECTOR PLAN 1
-2/2 + enterovirus/rhinovirus, restrictive lung dz, HF  -O2: Remains on 2 L NC sat 98%  -Pulm eval 4/2023 revealed mild restrictive disease due to spinal kyphosis. Pulm rec'd outpt eval  -Diuretics: D/c IV lasix as clinically euvolemic & SCr inc to 1.52  -Duonebs q6hrs PRN. OOBTC and incentive spirometry  -Plan: 250 IV NS bolus w/ repeat BMP later today to ensure kidney function normalizes

## 2023-11-29 NOTE — PROGRESS NOTE ADULT - SUBJECTIVE AND OBJECTIVE BOX
Cardiology PA Adult Progress Note    SUBJECTIVE ASSESSMENT: Seen and examined at bedside. Pt states she feels fine and would like to go home. Denies SOB, palpitations, CP. Pt ambulated with PT and desat'd down to 88 % spO2 off O2. Pt remains on 2 L O2 satting 98%. Scr inc to 1.52 on lasix, d/c IV lasix.  	  MEDICATIONS:  metoprolol tartrate 100 milliGRAM(s) Oral two times a day  albuterol/ipratropium for Nebulization 3 milliLiter(s) Nebulizer every 6 hours  melatonin 5 milliGRAM(s) Oral at bedtime  pantoprazole    Tablet 40 milliGRAM(s) Oral before breakfast  senna 2 Tablet(s) Oral at bedtime  atorvastatin 40 milliGRAM(s) Oral at bedtime  dapagliflozin 10 milliGRAM(s) Oral every 24 hours  chlorhexidine 4% Liquid 1 Application(s) Topical once  cholecalciferol 2000 Unit(s) Oral daily  cyanocobalamin 1000 MICROGram(s) Oral daily  folic acid 1 milliGRAM(s) Oral daily  multivitamin 1 Tablet(s) Oral daily  nystatin Powder 1 Application(s) Topical two times a day  rivaroxaban 20 milliGRAM(s) Oral with dinner  sodium chloride 0.65% Nasal 1 Spray(s) Both Nostrils every 3 hours PRN    	  VITAL SIGNS:  T(C): 36.5 (11-29-23 @ 13:01), Max: 36.8 (11-28-23 @ 16:53)  HR: 77 (11-29-23 @ 13:01) (58 - 84)  BP: 124/53 (11-29-23 @ 13:01) (91/57 - 134/65)  RR: 18 (11-29-23 @ 13:01) (15 - 18)  SpO2: 93% (11-29-23 @ 13:01) (89% - 98%)  Wt(kg): --    I&O's Summary    28 Nov 2023 07:01  -  29 Nov 2023 07:00  --------------------------------------------------------  IN: 980 mL / OUT: 550 mL / NET: 430 mL    29 Nov 2023 07:01  -  29 Nov 2023 13:32  --------------------------------------------------------  IN: 180 mL / OUT: 0 mL / NET: 180 mL                                           PHYSICAL EXAM:  Appearance: Normal	  HEENT: Normal oral mucosa, PERRL, EOMI	  Neck: Supple, + JVD/ - JVD; ___ Carotid Bruit   Cardiovascular: Normal S1 S2, No murmurs  Respiratory: Lungs clear to auscultation/Decreased Breath Sounds/No Rales, Rhonchi, Wheezing	  Gastrointestinal:  Soft, Non-tender, + BS	  Skin: No rashes, No ecchymoses, No cyanosis  Extremities: Normal range of motion, No clubbing, cyanosis or edema  Vascular: Peripheral pulses palpable 2+ bilaterally  Neurologic: Non-focal  Psychiatry: A & O x 3, Mood & affect appropriate    LABS:	 	                        13.5   6.59  )-----------( 176      ( 29 Nov 2023 05:30 )             43.0     11-29    137  |  97  |  32<H>  ----------------------------<  132<H>  4.5   |  31  |  1.52<H>    Ca    10.2      29 Nov 2023 05:30  Mg     2.2     11-29

## 2023-11-29 NOTE — PROGRESS NOTE ADULT - PROBLEM SELECTOR PLAN 2
-TTE 11/22/23 w/ Hyperdynamic LVSF, EF >75%, w/ cavity obliteration resulting in an intra-cavitary gradient of 66.00 mmHg w/ Valsalva maneuver, Mod AS/TR, PASP 116 mmHg.   -Limited TTE 11/24: Improved intracavitary gradients, mod AS, mod PH  -c/w lopressor 100 mg BID. Structural consulted now signed off as of 11/27 as RHC with no Severe AS or LVOT;  -RHC/LHC w/ Kodra 11/27: Non obstructive CAD, Elevated filling pressures, No Severe AS or LVOT.

## 2023-11-29 NOTE — PROGRESS NOTE ADULT - PROBLEM SELECTOR PLAN 5
- A1c 5.7%. Prediabetic;  Hold home Jardiance inpt.    - Glucose checks    #HTN (Hypertension)  - -140s.   - Home Nebivolol 10mg PO QD not on formulary. c/w Lopressor as above.    #HLD (Hyperlipidemia)  - Lipid panel well controlled, c/w Atorvastatin 40mg PO QHS.    #Venous stasis wounds    - Wound care consulted, await/appreciate recs    N: DASH/TLC diet   E: Replete lytes PRN   P: DVT PPX: Xarelto  C: FULL CODE   Dispo: PT no needs     Case discussed with Dr. Mohan, reivewed Medicine and Pulmonology.

## 2023-11-29 NOTE — PROGRESS NOTE ADULT - SUBJECTIVE AND OBJECTIVE BOX
Patient is a 78y old  Female who presents with a chief complaint of Acute hypoxic respiratory failure (2023 13:31)      INTERVAL HPI/OVERNIGHT EVENTS:    Pt. seen and examined earlier today  Pt. reports her GREGORY is mostly back to baseline  Pt. denies F/C, CP, cough  OOB and ambulated w/ PT    Review of Systems: 12 point review of systems otherwise negative    MEDICATIONS  (STANDING):  albuterol/ipratropium for Nebulization 3 milliLiter(s) Nebulizer every 6 hours  atorvastatin 40 milliGRAM(s) Oral at bedtime  chlorhexidine 4% Liquid 1 Application(s) Topical once  cholecalciferol 2000 Unit(s) Oral daily  cyanocobalamin 1000 MICROGram(s) Oral daily  dapagliflozin 10 milliGRAM(s) Oral every 24 hours  folic acid 1 milliGRAM(s) Oral daily  melatonin 5 milliGRAM(s) Oral at bedtime  metoprolol tartrate 100 milliGRAM(s) Oral two times a day  multivitamin 1 Tablet(s) Oral daily  nystatin Powder 1 Application(s) Topical two times a day  pantoprazole    Tablet 40 milliGRAM(s) Oral before breakfast  rivaroxaban 20 milliGRAM(s) Oral with dinner  senna 2 Tablet(s) Oral at bedtime  sodium chloride 0.9%. 1000 milliLiter(s) (75 mL/Hr) IV Continuous <Continuous>    MEDICATIONS  (PRN):  sodium chloride 0.65% Nasal 1 Spray(s) Both Nostrils every 3 hours PRN Nasal Congestion      Allergies    No Known Allergies    Intolerances          Vital Signs Last 24 Hrs  T(C): 36.5 (2023 13:01), Max: 36.8 (2023 16:53)  T(F): 97.7 (2023 13:01), Max: 98.2 (2023 16:53)  HR: 77 (2023 13:01) (58 - 84)  BP: 124/53 (2023 13:) (91/57 - 134/65)  BP(mean): 84 (2023 05:40) (84 - 90)  RR: 18 (2023 13:) (15 - 18)  SpO2: 93% (2023 13:01) (89% - 98%)    Parameters below as of 2023 13:01  Patient On (Oxygen Delivery Method): room air      CAPILLARY BLOOD GLUCOSE           @ 07: @ 07:00  --------------------------------------------------------  IN: 980 mL / OUT: 550 mL / NET: 430 mL     @ 07: @ 16:10  --------------------------------------------------------  IN: 180 mL / OUT: 0 mL / NET: 180 mL        Physical Exam:  (earlier today)  Daily     Daily Weight in k.9 (2023 06:54)  General:  comfortable-appearing in NAD  HEENT:  MMM  CV:  irregular S1S2, no JVD  Lungs:  CTA B/L, normal WOB on 2L NC  Abdomen:  soft NT ND  Extremities:  no edema B/L LE  Skin:  WWP  Neuro:  AAOx3      LABS:                        13.5   6.59  )-----------( 176      ( 2023 05:30 )             43.0         138  |  100  |  39<H>  ----------------------------<  164<H>  4.3   |  27  |  1.71<H>    Ca    10.2      2023 15:22  Mg     2.2             Urinalysis Basic - ( 2023 15:22 )    Color: x / Appearance: x / SG: x / pH: x  Gluc: 164 mg/dL / Ketone: x  / Bili: x / Urobili: x   Blood: x / Protein: x / Nitrite: x   Leuk Esterase: x / RBC: x / WBC x   Sq Epi: x / Non Sq Epi: x / Bacteria: x

## 2023-11-29 NOTE — DISCHARGE NOTE PROVIDER - HOSPITAL COURSE
77 y/o F, poor historian, w/ PMHx of HTN, HLD, AFib (on Xarelto), chronic venous stasis, DM T2, neuropathy, Hx of uterine CA who presented to [facility] ED endorsing 2 days of worsening SOB with minimal exertion;     Admitted to cardiology for further workup; course c/b new hypoxia and ETOH withdrawal. Found to be overloaded with HFpEF moderate AS and concerns for Outflow   Echo with concerns of Moderate AS and outflow obstruction therefore CTS was consulted    Therefore patient underwent s/p RHC/LHC with Kodra 11/27 showing non obstructive CAD, no severe AS or LVOT but elevated filling pressures. Therefore CTS signed off   On RHC right sided pressures elevated still needing diuresis restarted on IV Lasix,   Optimized GDMT with BB, Sakshi? started copay is __, Diuretic with ___   Pulm following along as history of restrictive lung disease, can follow up with Dr. Silvestre and continue with Anoro at home 77 y/o F, poor historian, w/ PMHx of HTN, HLD, AFib (on Xarelto), chronic venous stasis, DM T2, neuropathy, Hx of uterine CA who presented to [facility] ED endorsing 2 days of worsening SOB with minimal exertion. Pt was admitted to cardiology for further workup; course c/b new hypoxia and ETOH withdrawal. Pt was found to be overloaded with HFpEF moderate AS and LVOT concern therefore CTS was consulted. Pt underwent RHC/LHC with Kodra 11/27 showing non obstructive CAD, no severe AS or LVOT but elevated filling pressures therefore pt was restarted on IV diuresis. Pt was found to have worsening kidney function secondary to overdiuresis in which pt given IV hydration overnight and Cr improved to ______.     During hospital course, pt found to be withdrawing from alcohol requiring one dose of ativan. Medicine consulted in which they recommended continue CIWA monitoring with low thresold for librium taper if withdrawal symptoms persisted however it resolved.     Pulmonary was consulted given history of restrictive lung disease and newly found hypoxemia. Pt had outpatient PFTs revealing moderate restrictive defect, with mildly reduced DLCO. Per pulm, RHC suggestive of group 2 pulmonary hypertension with elevated filling pressures. Her PFTs are due to kyphosis and would not likely contribute to her hypoxemia. Pt may resume anoro at home and will follow up with Dr. Silvestre outpatient.    Pt is asymptomatic at this time and denies CP, SOB, GREGORY, palpitations, dizziness, LOC, N/V, orthopnea/PND and leg swelling. Pt able to ambulate and void without complication. Pt is a candidate for discharge per  ___________. Pt will follow up with Dr. Km enriquez on 12/07 at 11:30 am at [address]     Discharge meds: metoprolol tartrate 100 milliGRAM BID, atorvastatin 40 mg QD, dapagliflozin 10 mg QD, rivaroxaban 20 mg QD     79 y/o F, poor historian, w/ PMHx of HTN, HLD, AFib (on Xarelto), chronic venous stasis, DM T2, neuropathy, Hx of uterine CA who presented to [facility] ED endorsing 2 days of worsening SOB with minimal exertion. Pt was admitted to cardiology for further workup; course c/b new hypoxia and ETOH withdrawal. Pt was found to be overloaded with HFpEF moderate AS and LVOT concern therefore CTS was consulted. Pt underwent RHC/LHC with Kodra 11/27 showing non obstructive CAD, no severe AS or LVOT but elevated filling pressures therefore pt was restarted on IV diuresis. Pt was found to have worsening kidney function secondary to overdiuresis in which pt given IV hydration overnight and Cr improved to 1.37.    During hospital course, pt found to be withdrawing from alcohol requiring one dose of ativan. Medicine consulted in which they recommended continue CIWA monitoring with low threshold for librium taper if withdrawal symptoms persisted, however it resolved.     Pulmonary was consulted given history of restrictive lung disease and newly found hypoxemia. Pt had outpatient PFTs revealing moderate restrictive defect, with mildly reduced DLCO. Per pulm, RHC suggestive of group 2 pulmonary hypertension with elevated filling pressures. Her PFTs are due to kyphosis and would not likely contribute to her hypoxemia. Pt may resume anoro at home and will follow up with Dr. Silvestre outpatient.    Pt is asymptomatic at this time and denies CP, SOB, GREGORY, palpitations, dizziness, LOC, N/V, orthopnea/PND and leg swelling. Pt able to ambulate and void without complication. Pt is a candidate for discharge per Dr. Mohan. Pt will follow up with Dr. Km enriquez on 12/07 at 11:30 am at [address]     Discharge meds: metoprolol tartrate 100 mg BID, atorvastatin 40 mg QD, dapagliflozin 10 mg QD, rivaroxaban 20 mg QD     79 y/o F, poor historian, w/ PMHx of HTN, HLD, AFib (on Xarelto), chronic venous stasis, DM T2, neuropathy, Hx of uterine CA who presented to [facility] ED endorsing 2 days of worsening SOB with minimal exertion. Pt was admitted to cardiology for further workup; course c/b new hypoxia and ETOH withdrawal. Pt was found to be overloaded and underwent IV diureses. Echocardiogram revealed hyperdynamic LVSF with cavity obliteration resulting in an intra-cavitary gradient of 66.00 mmHg with Valsalva maneuver. LVEF >75%, mild LVH, mod AS, PASP 116 mm hg, mod TR, mild MR, mild MS.     CT surgery consulted for significant VHD in which it was recommended to increase BB therapy and undergo LHC/RHC once euvolemic.  Pt had limited TTE after inc BB therapy which revealed improved intracavitary gradients, moderate AS, mod PH. Pt underwent RHC/LHC with Kodra 11/27 showing non obstructive CAD, no severe AS or LVOT but elevated filling pressures therefore pt was restarted on IV diuresis until euvolemic. Pt was found to have worsening kidney function secondary to overdiuresis in which pt given IV hydration overnight and Cr improved to 1.37.    During hospital course, pt found to be withdrawing from alcohol requiring one dose of ativan. Medicine consulted in which they recommended continue CIWA monitoring with low threshold for librium taper if withdrawal symptoms persisted, however it resolved. Pt was instructed to avoid excessive alcohol consumption.     Pulmonary was consulted given history of restrictive lung disease and newly found hypoxemia. Pt had outpatient PFTs revealing moderate restrictive defect, with mildly reduced DLCO. Per pulm, RHC suggestive of group 2 pulmonary hypertension with elevated filling pressures. Her PFTs are due to kyphosis and would not likely contribute to her hypoxemia. Pt has been weaned off oxygen therapy. Pt may resume anoro at home and will follow up with Dr. Silvestre outpatient.     Pt currently in rate controlled atrial fibrillation and will continue home xarelto 20 mg once daily.     Pt is asymptomatic at this time and denies CP, SOB, GREGORY, palpitations, dizziness, LOC, N/V, orthopnea/PND and leg swelling. Pt able to ambulate and void without complication. Pt is a candidate for discharge per Dr. Mohan. Pt will follow up with Dr. Km enriquez on 12/07 at 11:30 am at [address]     Discharge meds: metoprolol tartrate 100 mg BID, atorvastatin 40 mg QD, dapagliflozin 10 mg QD, rivaroxaban 20 mg QD

## 2023-11-30 ENCOUNTER — TRANSCRIPTION ENCOUNTER (OUTPATIENT)
Age: 78
End: 2023-11-30

## 2023-11-30 VITALS
HEART RATE: 55 BPM | OXYGEN SATURATION: 97 % | RESPIRATION RATE: 17 BRPM | DIASTOLIC BLOOD PRESSURE: 58 MMHG | SYSTOLIC BLOOD PRESSURE: 132 MMHG | TEMPERATURE: 97 F

## 2023-11-30 LAB
ANION GAP SERPL CALC-SCNC: 9 MMOL/L — SIGNIFICANT CHANGE UP (ref 5–17)
ANION GAP SERPL CALC-SCNC: 9 MMOL/L — SIGNIFICANT CHANGE UP (ref 5–17)
BUN SERPL-MCNC: 29 MG/DL — HIGH (ref 7–23)
BUN SERPL-MCNC: 29 MG/DL — HIGH (ref 7–23)
CALCIUM SERPL-MCNC: 9.7 MG/DL — SIGNIFICANT CHANGE UP (ref 8.4–10.5)
CALCIUM SERPL-MCNC: 9.7 MG/DL — SIGNIFICANT CHANGE UP (ref 8.4–10.5)
CHLORIDE SERPL-SCNC: 104 MMOL/L — SIGNIFICANT CHANGE UP (ref 96–108)
CHLORIDE SERPL-SCNC: 104 MMOL/L — SIGNIFICANT CHANGE UP (ref 96–108)
CO2 SERPL-SCNC: 28 MMOL/L — SIGNIFICANT CHANGE UP (ref 22–31)
CO2 SERPL-SCNC: 28 MMOL/L — SIGNIFICANT CHANGE UP (ref 22–31)
CREAT SERPL-MCNC: 1.37 MG/DL — HIGH (ref 0.5–1.3)
CREAT SERPL-MCNC: 1.37 MG/DL — HIGH (ref 0.5–1.3)
EGFR: 39 ML/MIN/1.73M2 — LOW
EGFR: 39 ML/MIN/1.73M2 — LOW
GLUCOSE SERPL-MCNC: 131 MG/DL — HIGH (ref 70–99)
GLUCOSE SERPL-MCNC: 131 MG/DL — HIGH (ref 70–99)
HCT VFR BLD CALC: 39.8 % — SIGNIFICANT CHANGE UP (ref 34.5–45)
HCT VFR BLD CALC: 39.8 % — SIGNIFICANT CHANGE UP (ref 34.5–45)
HGB BLD-MCNC: 12.7 G/DL — SIGNIFICANT CHANGE UP (ref 11.5–15.5)
HGB BLD-MCNC: 12.7 G/DL — SIGNIFICANT CHANGE UP (ref 11.5–15.5)
MAGNESIUM SERPL-MCNC: 1.9 MG/DL — SIGNIFICANT CHANGE UP (ref 1.6–2.6)
MAGNESIUM SERPL-MCNC: 1.9 MG/DL — SIGNIFICANT CHANGE UP (ref 1.6–2.6)
MCHC RBC-ENTMCNC: 30.8 PG — SIGNIFICANT CHANGE UP (ref 27–34)
MCHC RBC-ENTMCNC: 30.8 PG — SIGNIFICANT CHANGE UP (ref 27–34)
MCHC RBC-ENTMCNC: 31.9 GM/DL — LOW (ref 32–36)
MCHC RBC-ENTMCNC: 31.9 GM/DL — LOW (ref 32–36)
MCV RBC AUTO: 96.6 FL — SIGNIFICANT CHANGE UP (ref 80–100)
MCV RBC AUTO: 96.6 FL — SIGNIFICANT CHANGE UP (ref 80–100)
NRBC # BLD: 0 /100 WBCS — SIGNIFICANT CHANGE UP (ref 0–0)
NRBC # BLD: 0 /100 WBCS — SIGNIFICANT CHANGE UP (ref 0–0)
PLATELET # BLD AUTO: 177 K/UL — SIGNIFICANT CHANGE UP (ref 150–400)
PLATELET # BLD AUTO: 177 K/UL — SIGNIFICANT CHANGE UP (ref 150–400)
POTASSIUM SERPL-MCNC: 4 MMOL/L — SIGNIFICANT CHANGE UP (ref 3.5–5.3)
POTASSIUM SERPL-MCNC: 4 MMOL/L — SIGNIFICANT CHANGE UP (ref 3.5–5.3)
POTASSIUM SERPL-SCNC: 4 MMOL/L — SIGNIFICANT CHANGE UP (ref 3.5–5.3)
POTASSIUM SERPL-SCNC: 4 MMOL/L — SIGNIFICANT CHANGE UP (ref 3.5–5.3)
RBC # BLD: 4.12 M/UL — SIGNIFICANT CHANGE UP (ref 3.8–5.2)
RBC # BLD: 4.12 M/UL — SIGNIFICANT CHANGE UP (ref 3.8–5.2)
RBC # FLD: 18.8 % — HIGH (ref 10.3–14.5)
RBC # FLD: 18.8 % — HIGH (ref 10.3–14.5)
SODIUM SERPL-SCNC: 141 MMOL/L — SIGNIFICANT CHANGE UP (ref 135–145)
SODIUM SERPL-SCNC: 141 MMOL/L — SIGNIFICANT CHANGE UP (ref 135–145)
WBC # BLD: 5.76 K/UL — SIGNIFICANT CHANGE UP (ref 3.8–10.5)
WBC # BLD: 5.76 K/UL — SIGNIFICANT CHANGE UP (ref 3.8–10.5)
WBC # FLD AUTO: 5.76 K/UL — SIGNIFICANT CHANGE UP (ref 3.8–10.5)
WBC # FLD AUTO: 5.76 K/UL — SIGNIFICANT CHANGE UP (ref 3.8–10.5)

## 2023-11-30 PROCEDURE — 99239 HOSP IP/OBS DSCHRG MGMT >30: CPT

## 2023-11-30 PROCEDURE — 99233 SBSQ HOSP IP/OBS HIGH 50: CPT

## 2023-11-30 RX ORDER — NEBIVOLOL HYDROCHLORIDE 5 MG/1
1 TABLET ORAL
Refills: 0 | DISCHARGE

## 2023-11-30 RX ORDER — MAGNESIUM OXIDE 400 MG ORAL TABLET 241.3 MG
400 TABLET ORAL ONCE
Refills: 0 | Status: COMPLETED | OUTPATIENT
Start: 2023-11-30 | End: 2023-11-30

## 2023-11-30 RX ORDER — METOPROLOL TARTRATE 50 MG
1 TABLET ORAL
Qty: 60 | Refills: 3
Start: 2023-11-30 | End: 2024-03-28

## 2023-11-30 RX ORDER — EMPAGLIFLOZIN 10 MG/1
1 TABLET, FILM COATED ORAL
Refills: 0 | DISCHARGE

## 2023-11-30 RX ORDER — DAPAGLIFLOZIN 10 MG/1
1 TABLET, FILM COATED ORAL
Qty: 30 | Refills: 3
Start: 2023-11-30 | End: 2024-03-28

## 2023-11-30 RX ADMIN — Medication 3 MILLILITER(S): at 06:22

## 2023-11-30 RX ADMIN — Medication 100 MILLIGRAM(S): at 06:25

## 2023-11-30 RX ADMIN — PREGABALIN 1000 MICROGRAM(S): 225 CAPSULE ORAL at 11:39

## 2023-11-30 RX ADMIN — Medication 1 MILLIGRAM(S): at 11:39

## 2023-11-30 RX ADMIN — Medication 2000 UNIT(S): at 11:39

## 2023-11-30 RX ADMIN — MAGNESIUM OXIDE 400 MG ORAL TABLET 400 MILLIGRAM(S): 241.3 TABLET ORAL at 10:35

## 2023-11-30 RX ADMIN — DAPAGLIFLOZIN 10 MILLIGRAM(S): 10 TABLET, FILM COATED ORAL at 10:36

## 2023-11-30 RX ADMIN — NYSTATIN CREAM 1 APPLICATION(S): 100000 CREAM TOPICAL at 06:24

## 2023-11-30 RX ADMIN — Medication 1 TABLET(S): at 11:39

## 2023-11-30 RX ADMIN — Medication 3 MILLILITER(S): at 10:36

## 2023-11-30 RX ADMIN — PANTOPRAZOLE SODIUM 40 MILLIGRAM(S): 20 TABLET, DELAYED RELEASE ORAL at 06:25

## 2023-11-30 NOTE — PROGRESS NOTE ADULT - PROVIDER SPECIALTY LIST ADULT
Cardiology
Hospitalist
Internal Medicine
Internal Medicine
Pulmonology
Cardiology
Hospitalist
Pulmonology
Cardiology
Pulmonology
Cardiology
Hospitalist
Hospitalist
Internal Medicine
Cardiology

## 2023-11-30 NOTE — PROGRESS NOTE ADULT - TIME BILLING
direct patient encounter  reviewed labs and images  d/w Cardiology PA
face to face with patient, care coordination and documentation.
direct patient encounter  reviewed labs and images  d/w Cardiology PA

## 2023-11-30 NOTE — PROGRESS NOTE ADULT - ASSESSMENT
77 y/o F w/ atrial fibrillation on xarelto, HTN, HLD, DM2 with neuropathy and history of uterine cancer presenting with exertional dyspnea and admitted to the cardiology service for further workup.     #Hypoxia  #Dyspnea  #Acute hypoxic respiratory failure  TTE 11/22/23 w/ Hyperdynamic LVSF, EF >75%,  w/ cavity obliteration resulting in an intra-cavitary gradient of 66.00 mmHg w/ Valsalva maneuver; mild LVH; Dilated RV. MARISOL. Mod AS/TR. Mild MS/MR.  PulmHTN PASP 116 mmHg.  - improved s/p IV Lasix, monitor volume status  - RVP+ Enterovirus, possibly contributing  - appreciate input from pulmonology: on Anoro at home, can resume on stiolto while here  Outpatient PFTs-moderate restrictive defect, with mildly reduced DLCO  - concern for precapillary pulmonary hypertension, and will need outpatient pulm follow up  - wean oxygen as able; ambulatory O2 sats >88%    #TED  - likely due to Lasix; UA specific gravity elevated and FeUrea < 35% c/w prerenal dehydration; Lasix d/c'ed; Cr improved s/p IVF; PO intake encouraged  - monitor BMP  - renally-dose rx    #HTN  #HLD  - plan per primary team    #Alcohol withdrawal  - continue CIWA monitor  - not requiring any ativan    #Atrial fibrillation  - continue xarelto, metoprolol

## 2023-11-30 NOTE — DISCHARGE NOTE NURSING/CASE MANAGEMENT/SOCIAL WORK - NSDCPEFALRISK_GEN_ALL_CORE
For information on Fall & Injury Prevention, visit: [email or web address] OR  [email or web address] OR  https://www.cdc.gov/steadi/patient.html

## 2023-11-30 NOTE — DISCHARGE NOTE NURSING/CASE MANAGEMENT/SOCIAL WORK - PATIENT PORTAL LINK FT
You can access the FollowMyHealth Patient Portal offered by [facility] by registering at the following website: [email or web address]. By joining [employer]’s FollowMyHealth portal, you will also be able to view your health information using other applications (apps) compatible with our system.

## 2023-12-01 ENCOUNTER — NON-APPOINTMENT (OUTPATIENT)
Age: 78
End: 2023-12-01

## 2023-12-01 ENCOUNTER — TRANSCRIPTION ENCOUNTER (OUTPATIENT)
Age: 78
End: 2023-12-01

## 2023-12-04 DIAGNOSIS — E78.5 HYPERLIPIDEMIA, UNSPECIFIED: ICD-10-CM

## 2023-12-04 DIAGNOSIS — Z79.82 LONG TERM (CURRENT) USE OF ASPIRIN: ICD-10-CM

## 2023-12-04 DIAGNOSIS — Z90.49 ACQUIRED ABSENCE OF OTHER SPECIFIED PARTS OF DIGESTIVE TRACT: ICD-10-CM

## 2023-12-04 DIAGNOSIS — B97.89 OTHER VIRAL AGENTS AS THE CAUSE OF DISEASES CLASSIFIED ELSEWHERE: ICD-10-CM

## 2023-12-04 DIAGNOSIS — M40.204 UNSPECIFIED KYPHOSIS, THORACIC REGION: ICD-10-CM

## 2023-12-04 DIAGNOSIS — Z85.42 PERSONAL HISTORY OF MALIGNANT NEOPLASM OF OTHER PARTS OF UTERUS: ICD-10-CM

## 2023-12-04 DIAGNOSIS — I48.0 PAROXYSMAL ATRIAL FIBRILLATION: ICD-10-CM

## 2023-12-04 DIAGNOSIS — B97.10 UNSPECIFIED ENTEROVIRUS AS THE CAUSE OF DISEASES CLASSIFIED ELSEWHERE: ICD-10-CM

## 2023-12-04 DIAGNOSIS — I27.22 PULMONARY HYPERTENSION DUE TO LEFT HEART DISEASE: ICD-10-CM

## 2023-12-04 DIAGNOSIS — Z90.710 ACQUIRED ABSENCE OF BOTH CERVIX AND UTERUS: ICD-10-CM

## 2023-12-04 DIAGNOSIS — Z79.01 LONG TERM (CURRENT) USE OF ANTICOAGULANTS: ICD-10-CM

## 2023-12-04 DIAGNOSIS — I08.3 COMBINED RHEUMATIC DISORDERS OF MITRAL, AORTIC AND TRICUSPID VALVES: ICD-10-CM

## 2023-12-04 DIAGNOSIS — N17.9 ACUTE KIDNEY FAILURE, UNSPECIFIED: ICD-10-CM

## 2023-12-04 DIAGNOSIS — J06.9 ACUTE UPPER RESPIRATORY INFECTION, UNSPECIFIED: ICD-10-CM

## 2023-12-04 DIAGNOSIS — J96.01 ACUTE RESPIRATORY FAILURE WITH HYPOXIA: ICD-10-CM

## 2023-12-04 DIAGNOSIS — I11.0 HYPERTENSIVE HEART DISEASE WITH HEART FAILURE: ICD-10-CM

## 2023-12-04 DIAGNOSIS — I25.10 ATHEROSCLEROTIC HEART DISEASE OF NATIVE CORONARY ARTERY WITHOUT ANGINA PECTORIS: ICD-10-CM

## 2023-12-04 DIAGNOSIS — F10.239 ALCOHOL DEPENDENCE WITH WITHDRAWAL, UNSPECIFIED: ICD-10-CM

## 2023-12-04 DIAGNOSIS — J98.4 OTHER DISORDERS OF LUNG: ICD-10-CM

## 2023-12-04 DIAGNOSIS — I87.2 VENOUS INSUFFICIENCY (CHRONIC) (PERIPHERAL): ICD-10-CM

## 2023-12-04 DIAGNOSIS — E11.42 TYPE 2 DIABETES MELLITUS WITH DIABETIC POLYNEUROPATHY: ICD-10-CM

## 2023-12-04 DIAGNOSIS — Z41.8 ENCOUNTER FOR OTHER PROCEDURES FOR PURPOSES OTHER THAN REMEDYING HEALTH STATE: ICD-10-CM

## 2023-12-04 DIAGNOSIS — I50.31 ACUTE DIASTOLIC (CONGESTIVE) HEART FAILURE: ICD-10-CM

## 2023-12-04 DIAGNOSIS — Z79.84 LONG TERM (CURRENT) USE OF ORAL HYPOGLYCEMIC DRUGS: ICD-10-CM

## 2023-12-07 ENCOUNTER — NON-APPOINTMENT (OUTPATIENT)
Age: 78
End: 2023-12-07

## 2023-12-07 ENCOUNTER — APPOINTMENT (OUTPATIENT)
Dept: HEART AND VASCULAR | Facility: CLINIC | Age: 78
End: 2023-12-07
Payer: MEDICARE

## 2023-12-07 VITALS
HEART RATE: 63 BPM | WEIGHT: 176 LBS | BODY MASS INDEX: 34.55 KG/M2 | SYSTOLIC BLOOD PRESSURE: 139 MMHG | DIASTOLIC BLOOD PRESSURE: 63 MMHG | HEIGHT: 60 IN | OXYGEN SATURATION: 96 % | TEMPERATURE: 97.5 F

## 2023-12-07 DIAGNOSIS — E78.5 HYPERLIPIDEMIA, UNSPECIFIED: ICD-10-CM

## 2023-12-07 DIAGNOSIS — I73.9 PERIPHERAL VASCULAR DISEASE, UNSPECIFIED: ICD-10-CM

## 2023-12-07 DIAGNOSIS — F10.10 ALCOHOL ABUSE, UNCOMPLICATED: ICD-10-CM

## 2023-12-07 DIAGNOSIS — I25.10 ATHEROSCLEROTIC HEART DISEASE OF NATIVE CORONARY ARTERY W/OUT ANGINA PECTORIS: ICD-10-CM

## 2023-12-07 DIAGNOSIS — N17.9 ACUTE KIDNEY FAILURE, UNSPECIFIED: ICD-10-CM

## 2023-12-07 DIAGNOSIS — I27.22 PULMONARY HYPERTENSION DUE TO LEFT HEART DISEASE: ICD-10-CM

## 2023-12-07 PROCEDURE — 99215 OFFICE O/P EST HI 40 MIN: CPT

## 2023-12-07 PROCEDURE — 93000 ELECTROCARDIOGRAM COMPLETE: CPT

## 2023-12-07 RX ORDER — PNV NO.95/FERROUS FUM/FOLIC AC 28MG-0.8MG
TABLET ORAL
Refills: 0 | Status: DISCONTINUED | COMMUNITY
End: 2023-12-07

## 2023-12-07 RX ORDER — CHLORHEXIDINE GLUCONATE 4 %
1000 LIQUID (ML) TOPICAL DAILY
Refills: 0 | Status: DISCONTINUED | COMMUNITY
End: 2023-12-07

## 2023-12-07 RX ORDER — FLUTICASONE PROPIONATE 50 UG/1
50 SPRAY, METERED NASAL
Qty: 10 | Refills: 0 | Status: DISCONTINUED | COMMUNITY
Start: 2017-06-01 | End: 2023-12-07

## 2023-12-07 RX ORDER — HYDROCHLOROTHIAZIDE 12.5 MG/1
12.5 TABLET ORAL
Qty: 30 | Refills: 0 | Status: DISCONTINUED | COMMUNITY
Start: 2017-04-07 | End: 2023-12-07

## 2023-12-07 RX ORDER — MULTIVIT-MIN/FOLIC/VIT K/LYCOP 400-300MCG
50 MCG TABLET ORAL
Refills: 0 | Status: DISCONTINUED | COMMUNITY
End: 2023-12-07

## 2023-12-07 RX ORDER — NEBIVOLOL HYDROCHLORIDE 20 MG/1
20 TABLET ORAL DAILY
Refills: 0 | Status: DISCONTINUED | COMMUNITY
End: 2023-12-07

## 2023-12-07 RX ORDER — HYDROCHLOROTHIAZIDE 12.5 MG/1
12.5 CAPSULE ORAL
Qty: 30 | Refills: 0 | Status: DISCONTINUED | COMMUNITY
Start: 2017-09-28 | End: 2023-12-07

## 2023-12-07 RX ORDER — ZOLPIDEM TARTRATE 10 MG/1
10 TABLET ORAL
Refills: 0 | Status: DISCONTINUED | COMMUNITY
End: 2023-12-07

## 2023-12-07 RX ORDER — TERCONAZOLE 4 MG/G
0.4 CREAM VAGINAL
Qty: 45 | Refills: 0 | Status: DISCONTINUED | COMMUNITY
Start: 2017-07-10 | End: 2023-12-07

## 2023-12-07 RX ORDER — OMEPRAZOLE 20 MG/1
20 CAPSULE, DELAYED RELEASE ORAL DAILY
Refills: 0 | Status: ACTIVE | COMMUNITY

## 2023-12-07 RX ORDER — UMECLIDINIUM BROMIDE AND VILANTEROL TRIFENATATE 62.5; 25 UG/1; UG/1
62.5-25 POWDER RESPIRATORY (INHALATION)
Qty: 60 | Refills: 11 | Status: DISCONTINUED | COMMUNITY
Start: 2023-10-10 | End: 2023-12-07

## 2023-12-07 RX ORDER — METOPROLOL TARTRATE 100 MG/1
100 TABLET, FILM COATED ORAL TWICE DAILY
Qty: 180 | Refills: 1 | Status: ACTIVE | COMMUNITY
Start: 1900-01-01 | End: 1900-01-01

## 2023-12-07 RX ORDER — RANITIDINE 150 MG/1
150 TABLET ORAL
Qty: 90 | Refills: 0 | Status: DISCONTINUED | COMMUNITY
Start: 2017-02-10 | End: 2023-12-07

## 2023-12-07 RX ORDER — RANITIDINE HYDROCHLORIDE 150 MG/1
150 CAPSULE ORAL TWICE DAILY
Refills: 0 | Status: DISCONTINUED | COMMUNITY
End: 2023-12-07

## 2023-12-07 RX ORDER — CHLORDIAZEPOXIDE HYDROCHLORIDE AND CLIDINIUM BROMIDE 5; 2.5 MG/1; MG/1
5-2.5 CAPSULE, GELATIN COATED ORAL
Qty: 25 | Refills: 0 | Status: DISCONTINUED | COMMUNITY
Start: 2017-06-22 | End: 2023-12-07

## 2023-12-07 RX ORDER — ASPIRIN 81 MG
81 TABLET, DELAYED RELEASE (ENTERIC COATED) ORAL DAILY
Refills: 0 | Status: DISCONTINUED | COMMUNITY
End: 2023-12-07

## 2023-12-07 RX ORDER — ATORVASTATIN CALCIUM 40 MG/1
40 TABLET, FILM COATED ORAL
Qty: 1 | Refills: 1 | Status: ACTIVE | COMMUNITY
Start: 1900-01-01 | End: 1900-01-01

## 2023-12-07 RX ORDER — DAPAGLIFLOZIN 10 MG/1
10 TABLET, FILM COATED ORAL DAILY
Qty: 90 | Refills: 1 | Status: ACTIVE | COMMUNITY
Start: 1900-01-01 | End: 1900-01-01

## 2023-12-07 RX ORDER — ESOMEPRAZOLE MAGNESIUM 40 MG/1
40 CAPSULE, DELAYED RELEASE ORAL DAILY
Refills: 0 | Status: DISCONTINUED | COMMUNITY
End: 2023-12-07

## 2023-12-08 LAB
ALBUMIN SERPL ELPH-MCNC: 4 G/DL
ALP BLD-CCNC: 79 U/L
ALT SERPL-CCNC: 28 U/L
ANION GAP SERPL CALC-SCNC: 11 MMOL/L
AST SERPL-CCNC: 26 U/L
BILIRUB SERPL-MCNC: 0.4 MG/DL
BUN SERPL-MCNC: 23 MG/DL
CALCIUM SERPL-MCNC: 9.5 MG/DL
CHLORIDE SERPL-SCNC: 107 MMOL/L
CO2 SERPL-SCNC: 22 MMOL/L
CREAT SERPL-MCNC: 1.06 MG/DL
EGFR: 53 ML/MIN/1.73M2
GLUCOSE SERPL-MCNC: 166 MG/DL
POTASSIUM SERPL-SCNC: 4.9 MMOL/L
PROT SERPL-MCNC: 6.6 G/DL
SODIUM SERPL-SCNC: 141 MMOL/L

## 2023-12-13 ENCOUNTER — TRANSCRIPTION ENCOUNTER (OUTPATIENT)
Age: 78
End: 2023-12-13

## 2023-12-13 PROCEDURE — 84540 ASSAY OF URINE/UREA-N: CPT

## 2023-12-13 PROCEDURE — 97161 PT EVAL LOW COMPLEX 20 MIN: CPT

## 2023-12-13 PROCEDURE — 97116 GAIT TRAINING THERAPY: CPT

## 2023-12-13 PROCEDURE — 82330 ASSAY OF CALCIUM: CPT

## 2023-12-13 PROCEDURE — 84156 ASSAY OF PROTEIN URINE: CPT

## 2023-12-13 PROCEDURE — 80076 HEPATIC FUNCTION PANEL: CPT

## 2023-12-13 PROCEDURE — C1769: CPT

## 2023-12-13 PROCEDURE — 71045 X-RAY EXAM CHEST 1 VIEW: CPT

## 2023-12-13 PROCEDURE — 80053 COMPREHEN METABOLIC PANEL: CPT

## 2023-12-13 PROCEDURE — C1887: CPT

## 2023-12-13 PROCEDURE — 83935 ASSAY OF URINE OSMOLALITY: CPT

## 2023-12-13 PROCEDURE — 80048 BASIC METABOLIC PNL TOTAL CA: CPT

## 2023-12-13 PROCEDURE — C1894: CPT

## 2023-12-13 PROCEDURE — C1760: CPT

## 2023-12-13 PROCEDURE — 80061 LIPID PANEL: CPT

## 2023-12-13 PROCEDURE — 81003 URINALYSIS AUTO W/O SCOPE: CPT

## 2023-12-13 PROCEDURE — 99285 EMERGENCY DEPT VISIT HI MDM: CPT

## 2023-12-13 PROCEDURE — 94640 AIRWAY INHALATION TREATMENT: CPT

## 2023-12-13 PROCEDURE — 85379 FIBRIN DEGRADATION QUANT: CPT

## 2023-12-13 PROCEDURE — 82570 ASSAY OF URINE CREATININE: CPT

## 2023-12-13 PROCEDURE — 97110 THERAPEUTIC EXERCISES: CPT

## 2023-12-13 PROCEDURE — 96374 THER/PROPH/DIAG INJ IV PUSH: CPT

## 2023-12-13 PROCEDURE — 84295 ASSAY OF SERUM SODIUM: CPT

## 2023-12-13 PROCEDURE — 85730 THROMBOPLASTIN TIME PARTIAL: CPT

## 2023-12-13 PROCEDURE — 82962 GLUCOSE BLOOD TEST: CPT

## 2023-12-13 PROCEDURE — 84300 ASSAY OF URINE SODIUM: CPT

## 2023-12-13 PROCEDURE — 85610 PROTHROMBIN TIME: CPT

## 2023-12-13 PROCEDURE — 84443 ASSAY THYROID STIM HORMONE: CPT

## 2023-12-13 PROCEDURE — C8929: CPT

## 2023-12-13 PROCEDURE — 84484 ASSAY OF TROPONIN QUANT: CPT

## 2023-12-13 PROCEDURE — 83735 ASSAY OF MAGNESIUM: CPT

## 2023-12-13 PROCEDURE — 84133 ASSAY OF URINE POTASSIUM: CPT

## 2023-12-13 PROCEDURE — 85025 COMPLETE CBC W/AUTO DIFF WBC: CPT

## 2023-12-13 PROCEDURE — 83880 ASSAY OF NATRIURETIC PEPTIDE: CPT

## 2023-12-13 PROCEDURE — 84132 ASSAY OF SERUM POTASSIUM: CPT

## 2023-12-13 PROCEDURE — 0225U NFCT DS DNA&RNA 21 SARSCOV2: CPT

## 2023-12-13 PROCEDURE — 82803 BLOOD GASES ANY COMBINATION: CPT

## 2023-12-13 PROCEDURE — 85027 COMPLETE CBC AUTOMATED: CPT

## 2023-12-13 PROCEDURE — 83036 HEMOGLOBIN GLYCOSYLATED A1C: CPT

## 2023-12-13 PROCEDURE — 93321 DOPPLER ECHO F-UP/LMTD STD: CPT

## 2023-12-13 PROCEDURE — 36415 COLL VENOUS BLD VENIPUNCTURE: CPT

## 2023-12-13 PROCEDURE — 84436 ASSAY OF TOTAL THYROXINE: CPT

## 2023-12-18 ENCOUNTER — TRANSCRIPTION ENCOUNTER (OUTPATIENT)
Age: 78
End: 2023-12-18

## 2023-12-26 ENCOUNTER — NON-APPOINTMENT (OUTPATIENT)
Age: 78
End: 2023-12-26

## 2023-12-27 ENCOUNTER — TRANSCRIPTION ENCOUNTER (OUTPATIENT)
Age: 78
End: 2023-12-27

## 2024-01-02 ENCOUNTER — NON-APPOINTMENT (OUTPATIENT)
Age: 79
End: 2024-01-02

## 2024-01-04 ENCOUNTER — APPOINTMENT (OUTPATIENT)
Dept: HEART AND VASCULAR | Facility: CLINIC | Age: 79
End: 2024-01-04
Payer: MEDICARE

## 2024-01-04 ENCOUNTER — INPATIENT (INPATIENT)
Facility: HOSPITAL | Age: 79
LOS: 3 days | Discharge: HOME | DRG: 291 | End: 2024-01-08
Attending: INTERNAL MEDICINE | Admitting: STUDENT IN AN ORGANIZED HEALTH CARE EDUCATION/TRAINING PROGRAM
Payer: MEDICARE

## 2024-01-04 VITALS
HEIGHT: 60 IN | SYSTOLIC BLOOD PRESSURE: 130 MMHG | WEIGHT: 179.9 LBS | RESPIRATION RATE: 20 BRPM | HEART RATE: 58 BPM | OXYGEN SATURATION: 96 % | DIASTOLIC BLOOD PRESSURE: 63 MMHG | TEMPERATURE: 98 F

## 2024-01-04 VITALS
HEIGHT: 60 IN | BODY MASS INDEX: 35.53 KG/M2 | SYSTOLIC BLOOD PRESSURE: 150 MMHG | OXYGEN SATURATION: 97 % | TEMPERATURE: 97.6 F | WEIGHT: 181 LBS | DIASTOLIC BLOOD PRESSURE: 80 MMHG | HEART RATE: 70 BPM

## 2024-01-04 DIAGNOSIS — F10.10 ALCOHOL ABUSE, UNCOMPLICATED: ICD-10-CM

## 2024-01-04 DIAGNOSIS — Z98.890 OTHER SPECIFIED POSTPROCEDURAL STATES: Chronic | ICD-10-CM

## 2024-01-04 DIAGNOSIS — Z90.49 ACQUIRED ABSENCE OF OTHER SPECIFIED PARTS OF DIGESTIVE TRACT: Chronic | ICD-10-CM

## 2024-01-04 DIAGNOSIS — R06.02 SHORTNESS OF BREATH: ICD-10-CM

## 2024-01-04 DIAGNOSIS — I38 ENDOCARDITIS, VALVE UNSPECIFIED: ICD-10-CM

## 2024-01-04 DIAGNOSIS — E78.5 HYPERLIPIDEMIA, UNSPECIFIED: ICD-10-CM

## 2024-01-04 DIAGNOSIS — I48.91 UNSPECIFIED ATRIAL FIBRILLATION: ICD-10-CM

## 2024-01-04 DIAGNOSIS — I87.2 VENOUS INSUFFICIENCY (CHRONIC) (PERIPHERAL): ICD-10-CM

## 2024-01-04 DIAGNOSIS — I10 ESSENTIAL (PRIMARY) HYPERTENSION: ICD-10-CM

## 2024-01-04 DIAGNOSIS — Z90.710 ACQUIRED ABSENCE OF BOTH CERVIX AND UTERUS: Chronic | ICD-10-CM

## 2024-01-04 LAB
ANION GAP SERPL CALC-SCNC: 12 MMOL/L — SIGNIFICANT CHANGE UP (ref 5–17)
ANION GAP SERPL CALC-SCNC: 12 MMOL/L — SIGNIFICANT CHANGE UP (ref 5–17)
APTT BLD: 34.1 SEC — SIGNIFICANT CHANGE UP (ref 24.5–35.6)
APTT BLD: 34.1 SEC — SIGNIFICANT CHANGE UP (ref 24.5–35.6)
BASE EXCESS BLDV CALC-SCNC: -1.1 MMOL/L — SIGNIFICANT CHANGE UP (ref -2–3)
BASE EXCESS BLDV CALC-SCNC: -1.1 MMOL/L — SIGNIFICANT CHANGE UP (ref -2–3)
BASOPHILS # BLD AUTO: 0.04 K/UL — SIGNIFICANT CHANGE UP (ref 0–0.2)
BASOPHILS # BLD AUTO: 0.04 K/UL — SIGNIFICANT CHANGE UP (ref 0–0.2)
BASOPHILS NFR BLD AUTO: 0.5 % — SIGNIFICANT CHANGE UP (ref 0–2)
BASOPHILS NFR BLD AUTO: 0.5 % — SIGNIFICANT CHANGE UP (ref 0–2)
BUN SERPL-MCNC: 26 MG/DL — HIGH (ref 7–23)
BUN SERPL-MCNC: 26 MG/DL — HIGH (ref 7–23)
CA-I SERPL-SCNC: 1.26 MMOL/L — SIGNIFICANT CHANGE UP (ref 1.15–1.33)
CA-I SERPL-SCNC: 1.26 MMOL/L — SIGNIFICANT CHANGE UP (ref 1.15–1.33)
CALCIUM SERPL-MCNC: 9.7 MG/DL — SIGNIFICANT CHANGE UP (ref 8.4–10.5)
CALCIUM SERPL-MCNC: 9.7 MG/DL — SIGNIFICANT CHANGE UP (ref 8.4–10.5)
CHLORIDE SERPL-SCNC: 100 MMOL/L — SIGNIFICANT CHANGE UP (ref 96–108)
CHLORIDE SERPL-SCNC: 100 MMOL/L — SIGNIFICANT CHANGE UP (ref 96–108)
CO2 BLDV-SCNC: 27.7 MMOL/L — HIGH (ref 22–26)
CO2 BLDV-SCNC: 27.7 MMOL/L — HIGH (ref 22–26)
CO2 SERPL-SCNC: 24 MMOL/L — SIGNIFICANT CHANGE UP (ref 22–31)
CO2 SERPL-SCNC: 24 MMOL/L — SIGNIFICANT CHANGE UP (ref 22–31)
CREAT SERPL-MCNC: 1.3 MG/DL — SIGNIFICANT CHANGE UP (ref 0.5–1.3)
CREAT SERPL-MCNC: 1.3 MG/DL — SIGNIFICANT CHANGE UP (ref 0.5–1.3)
EGFR: 42 ML/MIN/1.73M2 — LOW
EGFR: 42 ML/MIN/1.73M2 — LOW
EOSINOPHIL # BLD AUTO: 0.13 K/UL — SIGNIFICANT CHANGE UP (ref 0–0.5)
EOSINOPHIL # BLD AUTO: 0.13 K/UL — SIGNIFICANT CHANGE UP (ref 0–0.5)
EOSINOPHIL NFR BLD AUTO: 1.5 % — SIGNIFICANT CHANGE UP (ref 0–6)
EOSINOPHIL NFR BLD AUTO: 1.5 % — SIGNIFICANT CHANGE UP (ref 0–6)
FLUAV AG NPH QL: SIGNIFICANT CHANGE UP
FLUAV AG NPH QL: SIGNIFICANT CHANGE UP
FLUBV AG NPH QL: SIGNIFICANT CHANGE UP
FLUBV AG NPH QL: SIGNIFICANT CHANGE UP
GAS PNL BLDV: 136 MMOL/L — SIGNIFICANT CHANGE UP (ref 136–145)
GAS PNL BLDV: 136 MMOL/L — SIGNIFICANT CHANGE UP (ref 136–145)
GAS PNL BLDV: SIGNIFICANT CHANGE UP
GAS PNL BLDV: SIGNIFICANT CHANGE UP
GLUCOSE SERPL-MCNC: 123 MG/DL — HIGH (ref 70–99)
GLUCOSE SERPL-MCNC: 123 MG/DL — HIGH (ref 70–99)
HCO3 BLDV-SCNC: 26 MMOL/L — SIGNIFICANT CHANGE UP (ref 22–29)
HCO3 BLDV-SCNC: 26 MMOL/L — SIGNIFICANT CHANGE UP (ref 22–29)
HCT VFR BLD CALC: 37.5 % — SIGNIFICANT CHANGE UP (ref 34.5–45)
HCT VFR BLD CALC: 37.5 % — SIGNIFICANT CHANGE UP (ref 34.5–45)
HGB BLD-MCNC: 11.8 G/DL — SIGNIFICANT CHANGE UP (ref 11.5–15.5)
HGB BLD-MCNC: 11.8 G/DL — SIGNIFICANT CHANGE UP (ref 11.5–15.5)
IMM GRANULOCYTES NFR BLD AUTO: 0.5 % — SIGNIFICANT CHANGE UP (ref 0–0.9)
IMM GRANULOCYTES NFR BLD AUTO: 0.5 % — SIGNIFICANT CHANGE UP (ref 0–0.9)
INR BLD: 1.83 — HIGH (ref 0.85–1.18)
INR BLD: 1.83 — HIGH (ref 0.85–1.18)
LYMPHOCYTES # BLD AUTO: 0.86 K/UL — LOW (ref 1–3.3)
LYMPHOCYTES # BLD AUTO: 0.86 K/UL — LOW (ref 1–3.3)
LYMPHOCYTES # BLD AUTO: 10.1 % — LOW (ref 13–44)
LYMPHOCYTES # BLD AUTO: 10.1 % — LOW (ref 13–44)
MCHC RBC-ENTMCNC: 30.6 PG — SIGNIFICANT CHANGE UP (ref 27–34)
MCHC RBC-ENTMCNC: 30.6 PG — SIGNIFICANT CHANGE UP (ref 27–34)
MCHC RBC-ENTMCNC: 31.5 GM/DL — LOW (ref 32–36)
MCHC RBC-ENTMCNC: 31.5 GM/DL — LOW (ref 32–36)
MCV RBC AUTO: 97.4 FL — SIGNIFICANT CHANGE UP (ref 80–100)
MCV RBC AUTO: 97.4 FL — SIGNIFICANT CHANGE UP (ref 80–100)
MONOCYTES # BLD AUTO: 0.63 K/UL — SIGNIFICANT CHANGE UP (ref 0–0.9)
MONOCYTES # BLD AUTO: 0.63 K/UL — SIGNIFICANT CHANGE UP (ref 0–0.9)
MONOCYTES NFR BLD AUTO: 7.4 % — SIGNIFICANT CHANGE UP (ref 2–14)
MONOCYTES NFR BLD AUTO: 7.4 % — SIGNIFICANT CHANGE UP (ref 2–14)
NEUTROPHILS # BLD AUTO: 6.79 K/UL — SIGNIFICANT CHANGE UP (ref 1.8–7.4)
NEUTROPHILS # BLD AUTO: 6.79 K/UL — SIGNIFICANT CHANGE UP (ref 1.8–7.4)
NEUTROPHILS NFR BLD AUTO: 80 % — HIGH (ref 43–77)
NEUTROPHILS NFR BLD AUTO: 80 % — HIGH (ref 43–77)
NRBC # BLD: 0 /100 WBCS — SIGNIFICANT CHANGE UP (ref 0–0)
NRBC # BLD: 0 /100 WBCS — SIGNIFICANT CHANGE UP (ref 0–0)
NT-PROBNP SERPL-SCNC: 5822 PG/ML — HIGH (ref 0–300)
NT-PROBNP SERPL-SCNC: 5822 PG/ML — HIGH (ref 0–300)
PCO2 BLDV: 53 MMHG — HIGH (ref 39–42)
PCO2 BLDV: 53 MMHG — HIGH (ref 39–42)
PH BLDV: 7.3 — LOW (ref 7.32–7.43)
PH BLDV: 7.3 — LOW (ref 7.32–7.43)
PLATELET # BLD AUTO: 207 K/UL — SIGNIFICANT CHANGE UP (ref 150–400)
PLATELET # BLD AUTO: 207 K/UL — SIGNIFICANT CHANGE UP (ref 150–400)
PO2 BLDV: <33 MMHG — LOW (ref 25–45)
PO2 BLDV: <33 MMHG — LOW (ref 25–45)
POTASSIUM BLDV-SCNC: 4.8 MMOL/L — SIGNIFICANT CHANGE UP (ref 3.5–5.1)
POTASSIUM BLDV-SCNC: 4.8 MMOL/L — SIGNIFICANT CHANGE UP (ref 3.5–5.1)
POTASSIUM SERPL-MCNC: 5.2 MMOL/L — SIGNIFICANT CHANGE UP (ref 3.5–5.3)
POTASSIUM SERPL-MCNC: 5.2 MMOL/L — SIGNIFICANT CHANGE UP (ref 3.5–5.3)
POTASSIUM SERPL-SCNC: 5.2 MMOL/L — SIGNIFICANT CHANGE UP (ref 3.5–5.3)
POTASSIUM SERPL-SCNC: 5.2 MMOL/L — SIGNIFICANT CHANGE UP (ref 3.5–5.3)
PROTHROM AB SERPL-ACNC: 20.5 SEC — HIGH (ref 9.5–13)
PROTHROM AB SERPL-ACNC: 20.5 SEC — HIGH (ref 9.5–13)
RBC # BLD: 3.85 M/UL — SIGNIFICANT CHANGE UP (ref 3.8–5.2)
RBC # BLD: 3.85 M/UL — SIGNIFICANT CHANGE UP (ref 3.8–5.2)
RBC # FLD: 19.6 % — HIGH (ref 10.3–14.5)
RBC # FLD: 19.6 % — HIGH (ref 10.3–14.5)
RSV RNA NPH QL NAA+NON-PROBE: SIGNIFICANT CHANGE UP
RSV RNA NPH QL NAA+NON-PROBE: SIGNIFICANT CHANGE UP
SAO2 % BLDV: 21.6 % — LOW (ref 67–88)
SAO2 % BLDV: 21.6 % — LOW (ref 67–88)
SARS-COV-2 RNA SPEC QL NAA+PROBE: SIGNIFICANT CHANGE UP
SARS-COV-2 RNA SPEC QL NAA+PROBE: SIGNIFICANT CHANGE UP
SODIUM SERPL-SCNC: 136 MMOL/L — SIGNIFICANT CHANGE UP (ref 135–145)
SODIUM SERPL-SCNC: 136 MMOL/L — SIGNIFICANT CHANGE UP (ref 135–145)
TROPONIN T, HIGH SENSITIVITY RESULT: 33 NG/L — SIGNIFICANT CHANGE UP (ref 0–51)
TROPONIN T, HIGH SENSITIVITY RESULT: 33 NG/L — SIGNIFICANT CHANGE UP (ref 0–51)
TROPONIN T, HIGH SENSITIVITY RESULT: 34 NG/L — SIGNIFICANT CHANGE UP (ref 0–51)
TROPONIN T, HIGH SENSITIVITY RESULT: 34 NG/L — SIGNIFICANT CHANGE UP (ref 0–51)
WBC # BLD: 8.49 K/UL — SIGNIFICANT CHANGE UP (ref 3.8–10.5)
WBC # BLD: 8.49 K/UL — SIGNIFICANT CHANGE UP (ref 3.8–10.5)
WBC # FLD AUTO: 8.49 K/UL — SIGNIFICANT CHANGE UP (ref 3.8–10.5)
WBC # FLD AUTO: 8.49 K/UL — SIGNIFICANT CHANGE UP (ref 3.8–10.5)

## 2024-01-04 PROCEDURE — 99223 1ST HOSP IP/OBS HIGH 75: CPT

## 2024-01-04 PROCEDURE — 93010 ELECTROCARDIOGRAM REPORT: CPT

## 2024-01-04 PROCEDURE — 71045 X-RAY EXAM CHEST 1 VIEW: CPT | Mod: 26

## 2024-01-04 PROCEDURE — 99215 OFFICE O/P EST HI 40 MIN: CPT

## 2024-01-04 PROCEDURE — 99285 EMERGENCY DEPT VISIT HI MDM: CPT

## 2024-01-04 RX ORDER — ATORVASTATIN CALCIUM 80 MG/1
1 TABLET, FILM COATED ORAL
Refills: 0 | DISCHARGE

## 2024-01-04 RX ORDER — ESZOPICLONE 2 MG/1
1 TABLET, COATED ORAL
Refills: 0 | DISCHARGE

## 2024-01-04 RX ORDER — METOPROLOL TARTRATE 50 MG
100 TABLET ORAL
Refills: 0 | Status: DISCONTINUED | OUTPATIENT
Start: 2024-01-04 | End: 2024-01-07

## 2024-01-04 RX ORDER — NYSTATIN CREAM 100000 [USP'U]/G
1 CREAM TOPICAL
Refills: 0 | Status: DISCONTINUED | OUTPATIENT
Start: 2024-01-04 | End: 2024-01-08

## 2024-01-04 RX ORDER — ATORVASTATIN CALCIUM 80 MG/1
40 TABLET, FILM COATED ORAL AT BEDTIME
Refills: 0 | Status: DISCONTINUED | OUTPATIENT
Start: 2024-01-04 | End: 2024-01-08

## 2024-01-04 RX ORDER — OMEPRAZOLE 10 MG/1
1 CAPSULE, DELAYED RELEASE ORAL
Refills: 0 | DISCHARGE

## 2024-01-04 RX ORDER — OXYMETAZOLINE HYDROCHLORIDE 0.5 MG/ML
1 SPRAY NASAL
Refills: 0 | Status: COMPLETED | OUTPATIENT
Start: 2024-01-04 | End: 2024-01-07

## 2024-01-04 RX ORDER — CHOLECALCIFEROL (VITAMIN D3) 125 MCG
1 CAPSULE ORAL
Qty: 0 | Refills: 0 | DISCHARGE

## 2024-01-04 RX ORDER — FUROSEMIDE 40 MG
40 TABLET ORAL ONCE
Refills: 0 | Status: COMPLETED | OUTPATIENT
Start: 2024-01-04 | End: 2024-01-04

## 2024-01-04 RX ORDER — DAPAGLIFLOZIN 10 MG/1
10 TABLET, FILM COATED ORAL EVERY 24 HOURS
Refills: 0 | Status: DISCONTINUED | OUTPATIENT
Start: 2024-01-04 | End: 2024-01-08

## 2024-01-04 RX ORDER — FUROSEMIDE 40 MG
40 TABLET ORAL
Refills: 0 | Status: DISCONTINUED | OUTPATIENT
Start: 2024-01-04 | End: 2024-01-06

## 2024-01-04 RX ORDER — PANTOPRAZOLE SODIUM 20 MG/1
40 TABLET, DELAYED RELEASE ORAL
Refills: 0 | Status: DISCONTINUED | OUTPATIENT
Start: 2024-01-04 | End: 2024-01-08

## 2024-01-04 RX ORDER — RIVAROXABAN 15 MG-20MG
20 KIT ORAL
Refills: 0 | Status: DISCONTINUED | OUTPATIENT
Start: 2024-01-04 | End: 2024-01-08

## 2024-01-04 RX ORDER — SODIUM ZIRCONIUM CYCLOSILICATE 10 G/10G
10 POWDER, FOR SUSPENSION ORAL ONCE
Refills: 0 | Status: COMPLETED | OUTPATIENT
Start: 2024-01-04 | End: 2024-01-04

## 2024-01-04 RX ORDER — FUROSEMIDE 40 MG
20 TABLET ORAL
Refills: 0 | Status: DISCONTINUED | OUTPATIENT
Start: 2024-01-04 | End: 2024-01-04

## 2024-01-04 RX ORDER — PREGABALIN 225 MG/1
1000 CAPSULE ORAL DAILY
Refills: 0 | Status: DISCONTINUED | OUTPATIENT
Start: 2024-01-04 | End: 2024-01-08

## 2024-01-04 RX ORDER — SPIRONOLACTONE 25 MG/1
25 TABLET, FILM COATED ORAL DAILY
Refills: 0 | Status: DISCONTINUED | OUTPATIENT
Start: 2024-01-04 | End: 2024-01-08

## 2024-01-04 RX ORDER — RIVAROXABAN 15 MG-20MG
1 KIT ORAL
Qty: 0 | Refills: 0 | DISCHARGE

## 2024-01-04 RX ORDER — PREGABALIN 225 MG/1
1 CAPSULE ORAL
Qty: 0 | Refills: 0 | DISCHARGE

## 2024-01-04 RX ORDER — ZOLPIDEM TARTRATE 10 MG/1
5 TABLET ORAL AT BEDTIME
Refills: 0 | Status: DISCONTINUED | OUTPATIENT
Start: 2024-01-04 | End: 2024-01-08

## 2024-01-04 RX ORDER — RIVAROXABAN 15 MG-20MG
15 KIT ORAL
Refills: 0 | Status: DISCONTINUED | OUTPATIENT
Start: 2024-01-04 | End: 2024-01-04

## 2024-01-04 RX ADMIN — NYSTATIN CREAM 1 APPLICATION(S): 100000 CREAM TOPICAL at 19:37

## 2024-01-04 RX ADMIN — PREGABALIN 1000 MICROGRAM(S): 225 CAPSULE ORAL at 17:58

## 2024-01-04 RX ADMIN — Medication 40 MILLIGRAM(S): at 19:37

## 2024-01-04 RX ADMIN — SPIRONOLACTONE 25 MILLIGRAM(S): 25 TABLET, FILM COATED ORAL at 17:58

## 2024-01-04 RX ADMIN — Medication 100 MILLIGRAM(S): at 17:58

## 2024-01-04 RX ADMIN — ATORVASTATIN CALCIUM 40 MILLIGRAM(S): 80 TABLET, FILM COATED ORAL at 23:05

## 2024-01-04 RX ADMIN — RIVAROXABAN 20 MILLIGRAM(S): KIT at 17:57

## 2024-01-04 RX ADMIN — SODIUM ZIRCONIUM CYCLOSILICATE 10 GRAM(S): 10 POWDER, FOR SUSPENSION ORAL at 15:23

## 2024-01-04 RX ADMIN — Medication 40 MILLIGRAM(S): at 13:04

## 2024-01-04 NOTE — H&P ADULT - NS ATTEND AMEND GEN_ALL_CORE FT
77 yo lady PMHx of non-obstructive CAD ([facility] Nov/2023) paroxysmal Afib on rivaroxaban, HFpEF, DM2 c/b diabetic neuropathy, and LE lymphedema who is admitted for acute on chronic decompensated HFpEF exacerbation.    Recent admission @ [facility] Nov/2023  At that time, TTE w/ hyperdynamic LV w/ intra-cavitary gradient 66 mmHg w/ valsalva and elevated PASP  However, in the cath lab, no significant LVOT gradient was noted. Peak-peak aortic gradient @ rest was 5 mmHg. Mitral valve gradient 3 mmHg.  RHC did reveal RA 8, RV 65/11, PA 75/25, PCWP 20, and Loretta CO/CI 3.7/2.2 c/w HFpEF    Assessment  1. Acute on chronic decompensated HFpEF exacerbation  2. Paroxysmal Afib   3. LE edema c/w chronic lymphedema  4. Non-obstructive CAD    Plan  1. IV lasix 40mg BID w/ strict I/O and daily standing weight  2. HFpEF: Will start MRA w/ Aldactone 25mg PO QD; c/w SGLT2i  3. Metoprolol tartrate 100mg BID for Afib  4. Xarelto at current dose for systemic embolization prevention  5. High intensity statin for HLD + CAD  6. Will order LE arterial duplex b/l to r/o PAD (low suspicion but DP/PT difficult to palpate 2/2 edema)  7. Will repeat TTE    During non face-to-face time, I reviewed relevant portions of the patient’s medical record. During face-to-face time, I took a relevant history and examined the patient. I also explained differential diagnoses, relevant cardiac diagnoses, workup, and management plan, which required a high level of medical decision making. I answered all questions related to the patient's medical conditions.     Eva Schneider M.D.  CARDIOLOGY ATTENDING 77 yo lady PMHx of non-obstructive CAD ([facility] Nov/2023) paroxysmal Afib on rivaroxaban, HFpEF, DM2 c/b diabetic neuropathy, and LE lymphedema who is admitted for acute on chronic decompensated HFpEF exacerbation.    Recent admission @ [facility] Nov/2023  At that time, TTE w/ hyperdynamic LV w/ intra-cavitary gradient 66 mmHg w/ valsalva and elevated PASP  However, in the cath lab, no significant LVOT gradient was noted. Peak-peak aortic gradient @ rest was 5 mmHg. Mitral valve gradient 3 mmHg.  RHC did reveal RA 8, RV 65/11, PA 75/25, PCWP 20, and Loretta CO/CI 3.7/2.2 c/w HFpEF    Assessment  1. Acute on chronic decompensated HFpEF exacerbation  2. Paroxysmal Afib   3. LE edema c/w chronic lymphedema  4. Non-obstructive CAD    Plan  1. IV lasix 40mg BID w/ strict I/O and daily standing weight  2. HFpEF: Will start MRA w/ Aldactone 25mg PO QD; c/w SGLT2i  3. Metoprolol tartrate 100mg BID for Afib  4. Xarelto at current dose for systemic embolization prevention  5. High intensity statin for HLD + CAD  6. Will order LE arterial duplex b/l to r/o PAD (low suspicion but DP/PT difficult to palpate 2/2 edema)  7. Will repeat TTE    During non face-to-face time, I reviewed relevant portions of the patient’s medical record. During face-to-face time, I took a relevant history and examined the patient. I also explained differential diagnoses, relevant cardiac diagnoses, workup, and management plan, which required a high level of medical decision making. I answered all questions related to the patient's medical conditions.     Eva Schneider M.D.  CARDIOLOGY ATTENDING

## 2024-01-04 NOTE — H&P ADULT - HISTORY OF PRESENT ILLNESS
"Chief Complaint  sore on foot (outside of left foot feels bumps but cant see them x 2-3 weeks)    SUBJECTIVE  Enrique Wylie presents to [facility]  82-year-old increase skin sloughing on his feet has had numerous cancers worried about cancer    PAST MEDICAL HISTORY  No Known Allergies     Past Surgical History:   • CATARACT EXTRACTION   • CERVICAL FUSION    C4,5 and 6   • KNEE ARTHROSCOPY    right knee   • LUMBAR LAMINECTOMY    L3-4   • ROTATOR CUFF REPAIR   • SKIN BIOPSY    face and ears, squamous cell    • SPINE SURGERY    minimally invasive procedure \"closing in on spinal Cord\"  \"gave spinal cord more room\"       Social History     Tobacco Use   • Smoking status: Never Smoker   • Smokeless tobacco: Never Used   Substance Use Topics   • Alcohol use: Never       Family History   Problem Relation Age of Onset   • Heart failure Mother    • Heart failure Father         Health Maintenance Due   Topic Date Due   • TDAP/TD VACCINES (2 - Tdap) 10/30/2007   • ANNUAL WELLNESS VISIT  2021   • LIPID PANEL  2021      Last Completed Colonoscopy     This patient has no relevant Health Maintenance data.          REVIEW OF SYSTEMS  Neurologic no falls  GI has gained a little weight  Psych seems more satisfied now he was his wife  about a year ago  Cardiovascular no chest pain no palpitations  Respiratory no shortness of breath no dyspnea on exertion  Skin sloughing some skin on both feet more the left than the right no ulcers    OBJECTIVE  Vitals:    11/15/21 1037   BP: 120/64   Pulse: 55   Temp: 98.2 °F (36.8 °C)   SpO2: 97%   Weight: 83.5 kg (184 lb)   Height: 185.4 cm (73\")     Body mass index is 24.28 kg/m².    PHYSICAL EXAM  General no distress  Skin nonspecific dermatitis left foot worse than the right skin is dry needs to be moisturized discussed baby oil Crisco Vaseline  Cardiovascular regular rhythm no murmur  Respiratory lungs clear and equal bilaterally  Neurologic " mentation is normal speech is normal gait is normal    ASSESSMENT & PLAN  There are no diagnoses linked to this encounter.  Xerosis feet needs moisturization grief secondary to wife's death is lessening            Patient was given instructions and counseling regarding his condition or for health maintenance advice. Please see specific information pulled into the AVS if appropriate.    Cardiologist: Dr. Barbour    78F daily ETOH drinker, HTN, HLD, AFib (on Xarelto), chronic venous stasis, DMT2 w peripheral neuropathy, Hx of uterine CA who was admitted to [facility] 11/21-11/30/23 for ADHFpEF and hypoxia. TTE with intracavitary gradient, LVEF >75%, mild LVH, mod AS, PASP 116 mm hg, mod TR, mild MR, mild MS, with no plan for intervention from CTSx.  L/RHC 11/27 showing non obstructive CAD, no severe AS or LVOT but elevated filling pressures. IV diuresis to biochemical dehydration for which pt was given IV hydration with Cr improvement to 1.37 at discharge. Had some ETOH withdrawal sx requiring a dose of Ativan during hospitalization. PFTs w moderate restrictive defect likely from kyphosis, with mildly reduced DLCO. Was weaned off oxygen after diuresis. Today, patient presents to [facility] sent in by her cardiologist, because of worsening shortness of breath on minimal exertion. Patient reports that she was off Lasix for a week per her doctor. She reports she is barely able to walk around her apartment without getting very short of breath. Denies dizziness, LOC, chest pain, changes in bowel habits, bleeding,     In the ED:   Vitals: T(F): 98, Max: 98 (01-04 @ 13:09) HR: 56 (56 - 58) BP: 136/68 (130/63 - 136/68) RR: 20 (20 - 20) SpO2: 95% (95% - 96%)   Labs: hsTnT: 42, BNP 5822  Interventions: Lasix 40 mg IVP x1        Cardiologist: Dr. Barbour    78F daily ETOH drinker, HTN, HLD, AFib (on Xarelto), chronic venous stasis, DMT2 w peripheral neuropathy, Hx of uterine CA who was admitted to [facility] 11/21-11/30/23 for ADHFpEF and hypoxia. TTE with intracavitary gradient, LVEF >75%, mild LVH, mod AS, PASP 116 mm hg, mod TR, mild MR, mild MS, with no plan for intervention from CTSx.  L/RHC 11/27 showing non obstructive CAD, no severe AS or LVOT but elevated filling pressures. IV diuresis to biochemical dehydration for which pt was given IV hydration with Cr improvement to 1.37 at discharge. Had some ETOH withdrawal sx requiring a dose of Ativan during hospitalization. PFTs w moderate restrictive defect likely from kyphosis, with mildly reduced DLCO. Was weaned off oxygen after diuresis. Today, patient presents to [facility] sent in by her cardiologist, because of worsening shortness of breath on minimal exertion. Patient reports that she was off Lasix for a week per her doctor. She reports she is barely able to walk around her apartment without getting very short of breath. Denies dizziness, LOC, chest pain, changes in bowel habits, bleeding,     In the ED:   Vitals: T(F): 98, Max: 98 (01-04 @ 13:09) HR: 56 (56 - 58) BP: 136/68 (130/63 - 136/68) RR: 20 (20 - 20) SpO2: 95% (95% - 96%)   Labs: hsTnT: 42, BNP 5822  Interventions: Lasix 40 mg IVP x1

## 2024-01-04 NOTE — H&P ADULT - ASSESSMENT
78F daily ETOH drinker, HTN, HLD, AFib (on Xarelto), chronic venous stasis, DMT2 w peripheral neuropathy, Hx of uterine CA who was admitted to [facility] 11/21-11/30/23 for ADHFpEF with intracavitary gradient found on TTE with elevated filling pressures on L/RHC on 11/27 and hypoxia sent in by her cardiologist for SOB with minimal exertion. Admitted to cardiac telemetry for ADHFpEF.  78F daily ETOH drinker, HTN, HLD, AFib (on Xarelto), chronic venous stasis, DMT2 w peripheral neuropathy, Hx of uterine CA who was admitted to [facility] 11/21-11/30/23 for ADHFpEF with intracavitary gradient found on TTE with elevated filling pressures on L/RHC on 11/27 and hypoxia sent in by her cardiologist for SOB with minimal exertion. Admitted to cardiac telemetry for ADHFpEF.

## 2024-01-04 NOTE — REASON FOR VISIT
[Symptom and Test Evaluation] : symptom and test evaluation [FreeTextEntry1] :  CV Data: TTE 11/22 noted LV intracavitary gradient (66 mm hg) due to hyperdynamic systolic function with moderate AS, PASP ~116. Repeated echo 11/24 while on beta blockers: PASP 55 mm Hg, moderate AS, intracavitary gradient not well seen. RV dilated with normal function. L/RHC 11/27/23: no AS on valve study, no provocable LVOT obstruction w PVC. Elevated fill pressures, PCWP. RA 8, RV 65/11, PA 75/25 (MPAP 44), PCWP 20 V waves up to 30. Group 2 PH

## 2024-01-04 NOTE — PATIENT PROFILE ADULT - FALL HARM RISK - HARM RISK INTERVENTIONS
Assistance with ambulation/Assistance OOB with selected safe patient handling equipment/Communicate Risk of Fall with Harm to all staff/Discuss with provider need for PT consult/Monitor gait and stability/Provide patient with walking aids - walker, cane, crutches/Reinforce activity limits and safety measures with patient and family/Tailored Fall Risk Interventions/Visual Cue: Yellow wristband and red socks/Bed in lowest position, wheels locked, appropriate side rails in place/Call bell, personal items and telephone in reach/Instruct patient to call for assistance before getting out of bed or chair/Non-slip footwear when patient is out of bed/[place] to call system/Physically safe environment - no spills, clutter or unnecessary equipment/Purposeful Proactive Rounding/Room/bathroom lighting operational, light cord in reach Assistance with ambulation/Assistance OOB with selected safe patient handling equipment/Communicate Risk of Fall with Harm to all staff/Discuss with provider need for PT consult/Monitor gait and stability/Provide patient with walking aids - walker, cane, crutches/Reinforce activity limits and safety measures with patient and family/Tailored Fall Risk Interventions/Visual Cue: Yellow wristband and red socks/Bed in lowest position, wheels locked, appropriate side rails in place/Call bell, personal items and telephone in reach/Instruct patient to call for assistance before getting out of bed or chair/Non-slip footwear when patient is out of bed/[place] to call system/Physically safe environment - no spills, clutter or unnecessary equipment/Purposeful Proactive Rounding/Room/bathroom lighting operational, light cord in reach

## 2024-01-04 NOTE — H&P ADULT - PROBLEM SELECTOR PLAN 4
Attributes 1-2 bottles of Vodka per day  Last admission encountered withdrawal with resolved with one dose of Ativan   C/w TYRONEWA protocol

## 2024-01-04 NOTE — ED PROVIDER NOTE - OBJECTIVE STATEMENT
79 y/o F, poor historian, w/ PMHx of HTN, HLD, AFib (on Xarelto), chronic venous stasis, DM T2, neuropathy, Hx of uterine CA, recent admission for worsening sob with hyperdynamic LVSF, elevated filling pressures, large intracavitary gradient presenting with worsening sob. Pt endorsing worsening GREGORY since discharge, but worst over last several days. Saw Dr. Welch in office today, was told to come to ER for workup, repeat echo, possible IV diuresis. Denies worsening leg swelling. No cp. No fever, chills, nausea or vomiting. No lightheadedness or dizziness. No abd pain. ROS as above.      who presented to [facility] ED endorsing 2 days of worsening SOB with minimal exertion. Pt was admitted to cardiology for further workup; course c/b new hypoxia and ETOH withdrawal. Pt was found to be overloaded and underwent IV diureses. Echocardiogram revealed hyperdynamic LVSF with cavity obliteration resulting in an intra-cavitary gradient of 66.00 mmHg with Valsalva maneuver. LVEF >75%, mild LVH, mod AS, PASP 116 mm hg, mod TR, mild MR, mild MS.     CT surgery consulted for significant VHD in which it was recommended to increase BB therapy and undergo LHC/RHC once euvolemic.  Pt had limited TTE after inc BB therapy which revealed improved intracavitary gradients, moderate AS, mod PH. Pt underwent RHC/LHC with Kodra 11/27 showing non obstructive CAD, no severe AS or LVOT but elevated filling pressures therefore pt was restarted on IV diuresis until euvolemic. Pt was found to have worsening kidney function secondary to overdiuresis in which pt given IV hydration overnight and Cr improved to 1.37.    During hospital course, pt found to be withdrawing from alcohol requiring one dose of ativan. Medicine consulted in which they recommended continue CIWA monitoring with low threshold for librium taper if withdrawal symptoms persisted, however it resolved. Pt was instructed to avoid excessive alcohol consumption. 79 y/o F, poor historian, w/ PMHx of HTN, HLD, AFib (on Xarelto), chronic venous stasis, DM T2, neuropathy, Hx of uterine CA, recent admission for worsening sob with hyperdynamic LVSF, elevated filling pressures, large intracavitary gradient presenting with worsening sob. Pt endorsing worsening GREGORY since discharge, but worst over last several days. Saw Dr. Welch in office today, was told to come to ER for workup, repeat echo, possible IV diuresis. Denies worsening leg swelling. No cp. No fever, chills, nausea or vomiting. No lightheadedness or dizziness. No abd pain. ROS as above.      who presented to [facility] ED endorsing 2 days of worsening SOB with minimal exertion. Pt was admitted to cardiology for further workup; course c/b new hypoxia and ETOH withdrawal. Pt was found to be overloaded and underwent IV diureses. Echocardiogram revealed hyperdynamic LVSF with cavity obliteration resulting in an intra-cavitary gradient of 66.00 mmHg with Valsalva maneuver. LVEF >75%, mild LVH, mod AS, PASP 116 mm hg, mod TR, mild MR, mild MS.     CT surgery consulted for significant VHD in which it was recommended to increase BB therapy and undergo LHC/RHC once euvolemic.  Pt had limited TTE after inc BB therapy which revealed improved intracavitary gradients, moderate AS, mod PH. Pt underwent RHC/LHC with Kodra 11/27 showing non obstructive CAD, no severe AS or LVOT but elevated filling pressures therefore pt was restarted on IV diuresis until euvolemic. Pt was found to have worsening kidney function secondary to overdiuresis in which pt given IV hydration overnight and Cr improved to 1.37.    During hospital course, pt found to be withdrawing from alcohol requiring one dose of ativan. Medicine consulted in which they recommended continue CIWA monitoring with low threshold for librium taper if withdrawal symptoms persisted, however it resolved. Pt was instructed to avoid excessive alcohol consumption. 77 y/o F, poor historian, w/ PMHx of HTN, HLD, AFib (on Xarelto), chronic venous stasis, DM T2, neuropathy, Hx of uterine CA, recent admission for worsening sob with hyperdynamic LVSF, elevated filling pressures, large intracavitary gradient presenting with worsening sob. Pt endorsing worsening GREGORY since discharge, but worst over last several days. Saw Dr. Welch in office today, was told to come to ER for workup, repeat echo, possible IV diuresis. Denies worsening leg swelling. No cp. No fever, chills, nausea or vomiting. No lightheadedness or dizziness. No abd pain. ROS as above.

## 2024-01-04 NOTE — H&P ADULT - PROBLEM SELECTOR PLAN 1
SOB with minimal exertion acutely worsening over past 2 days.   - SpO2 93% on RA; pt visibly short of breath while walking.  now on 2L NC   - HS Trop T 42, trend second troponin   - BNP 5822.   - RVP negative   - PLAN:   c/w Lasix 20mg IV BID, repeat TTE to evaluate VHD as below (as per last admission patient will not need intervention by Structural)   GDMT: Toprol 100 mg BID SOB with minimal exertion acutely worsening over past 2 days.   - SpO2 93% on RA; pt visibly short of breath while walking.  now on 2L NC   - HS Trop T 42, trend second troponin   - BNP 5822.   - RVP negative   - PLAN:   c/w Lasix 40mg IV BID, repeat TTE to evaluate VHD as below (as per last admission patient will not need intervention by Structural)   GDMT: Toprol 100 mg BID

## 2024-01-04 NOTE — HISTORY OF PRESENT ILLNESS
[FreeTextEntry1] : 78F daily ETOH drinker, HTN, HLD, AFib (on Xarelto), chronic venous stasis, DMT2 w peripheral neuropathy, Hx of uterine CA who was admitted to [facility] 11/21-11/30/23 for ADHFpEF and hypoxia. TTE with intracavitary gradient, LVEF >75%, mild LVH, mod AS, PASP 116 mm hg, mod TR, mild MR, mild MS. L/RHC 11/27 showing non obstructive CAD, no severe AS or LVOT but elevated filling pressures. IV diuresis to biochemical dehydration for which pt was given IV hydration with Cr improvement to 1.37 at discharge. Had some ETOH withdrawal sx requiring a dose of Ativan during hospitalization. PFTs w moderate restrictive defect likely from kyphosis, with mildly reduced DLCO. Was weaned off oxygen after diuresis. Pt resumed anoro at home prn and will follow up with Dr. Silvestre.  Pt currently in rate controlled atrial fibrillation and tolerating xarelto 20 mg once daily.  12/7/23 FU: walking balance remains an issue - has been mostly sedentary, but moving around the house is okay - no orthopnea, PND. Leg swelling chronic and possibly less than usual - getting to the office just a little out of breath, not the same as what brought her to the hospital - confirmed taking the following discharge meds: metoprolol tartrate 100 mg BID, atorvastatin 40 mg QD, dapagliflozin 10 mg QD, rivaroxaban 20 mg QD  1/4/24 FU: patient reports being short of breath walking around her home - we tried to have her take lasix for a week without improvement - O2 sats normal in office today. Appears euvolemic, with some wheezing on chest auscultation. + DEAN - friend who came with her also concerned she cannot ambulate at home safely due to significant GREGORY

## 2024-01-04 NOTE — ED PROVIDER NOTE - PHYSICAL EXAMINATION
general: Well appearing, in no acute distress  HEENT: Normocephalic, atraumatic, extraocular movements intact  CV: Regular rate  Pulm: No respiratory distress, no tachypnea  Abd: Flat, no gross distension  Ext: warm and well perfused, chronic venous stasis  Skin: No gross rashes or lesions  Neuro: Alert and oriented, moving all extremities

## 2024-01-04 NOTE — H&P ADULT - NSHPPHYSICALEXAM_GEN_ALL_CORE
Constitutional:  well developed, well nourished, no acute distress, obese.   Eyes:  normal conjunctiva.   Neck:  normal venous pressure, no carotid bruit . +kyphotic.   Cardiac:  +DEAN, normal S1, S2, no rub, no gallop, murmur.   Pulmonary:  no respiratory distress, wheeze .   Abdomen:  abdomen soft, non-tender, no masses/organomegaly, normal bowel sounds.   Musculoskeletal:  abnormal gait.   Extremities:  no cyanosis, no clubbing, no varicosities.   Peripheral Pulses: edema ++ up to thighs, venous stasis.   Skin: . +++ chronic skin changes with some breaks in integrity on bilateral LE.   Neurological:  moves all extremities, no focal deficits, normal speech.   Psychiatric:  alert and oriented, normal memory.

## 2024-01-04 NOTE — ED PROVIDER NOTE - NS ED ROS FT
Constitutional: No fever or chills  Eyes: No discharge or drainage  Ears, Nose, Mouth, Throat: No nasal discharge, no sore throat  Cardiovascular: No chest pain, no palpitations  Respiratory: + shortness of breath, no cough  Gastrointestinal: No nausea or vomiting, no abdominal pain, no diarrhea or constipation  Musculoskeletal: No joint pain, no swelling  Skin: No rashes or lesions  Neurological: No numbness, weakness, tingling, no headache  Psychiatric: No depression

## 2024-01-04 NOTE — PHYSICAL EXAM
[Well Developed] : well developed [Well Nourished] : well nourished [No Acute Distress] : no acute distress [Obese] : obese [Normal Conjunctiva] : normal conjunctiva [Normal Venous Pressure] : normal venous pressure [No Carotid Bruit] : no carotid bruit [Normal S1, S2] : normal S1, S2 [No Rub] : no rub [No Gallop] : no gallop [Murmur] : murmur [No Respiratory Distress] : no respiratory distress  [Wheeze ____] : wheeze [unfilled] [Soft] : abdomen soft [Non Tender] : non-tender [No Masses/organomegaly] : no masses/organomegaly [Normal Bowel Sounds] : normal bowel sounds [Abnormal Gait] : abnormal gait [No Cyanosis] : no cyanosis [No Clubbing] : no clubbing [No Varicosities] : no varicosities [Edema ___] : edema [unfilled] [Venous stasis] : venous stasis [Moves all extremities] : moves all extremities [No Focal Deficits] : no focal deficits [Normal Speech] : normal speech [Alert and Oriented] : alert and oriented [Normal memory] : normal memory [de-identified] : +kyphotic [de-identified] : +DEAN [de-identified] : +++ chronic skin changes with some breaks in integrity on bilateral LE

## 2024-01-04 NOTE — H&P ADULT - NSHPLABSRESULTS_GEN_ALL_CORE
- Labs:                        11.8   8.49  )-----------( 207      ( 04 Jan 2024 11:27 )             37.5     01-04    136  |  100  |  26<H>  ----------------------------<  123<H>  5.2   |  24  |  1.30    Ca    9.7      04 Jan 2024 11:27      PT/INR - ( 04 Jan 2024 11:27 )   PT: 20.5 sec;   INR: 1.83          PTT - ( 04 Jan 2024 11:27 )  PTT:34.1 sec            Urinalysis Basic - ( 04 Jan 2024 11:27 )    Color: x / Appearance: x / SG: x / pH: x  Gluc: 123 mg/dL / Ketone: x  / Bili: x / Urobili: x   Blood: x / Protein: x / Nitrite: x   Leuk Esterase: x / RBC: x / WBC x   Sq Epi: x / Non Sq Epi: x / Bacteria: x

## 2024-01-04 NOTE — ED ADULT NURSE NOTE - NSFALLUNIVINTERV_ED_ALL_ED
Bed/Stretcher in lowest position, wheels locked, appropriate side rails in place/Call bell, personal items and telephone in reach/Instruct patient to call for assistance before getting out of bed/chair/stretcher/Non-slip footwear applied when patient is off stretcher/[place] to call system/Physically safe environment - no spills, clutter or unnecessary equipment/Purposeful proactive rounding/Room/bathroom lighting operational, light cord in reach Bed/Stretcher in lowest position, wheels locked, appropriate side rails in place/Call bell, personal items and telephone in reach/Instruct patient to call for assistance before getting out of bed/chair/stretcher/Non-slip footwear applied when patient is off stretcher/[place] to call system/Physically safe environment - no spills, clutter or unnecessary equipment/Purposeful proactive rounding/Room/bathroom lighting operational, light cord in reach

## 2024-01-04 NOTE — ED PROVIDER NOTE - CLINICAL SUMMARY MEDICAL DECISION MAKING FREE TEXT BOX
78 year old m presenting with sob, sent by cardiologist. no cp, not tachycardic, do not suspect PE, compliant with xerelto for afib, not tachypneic. Will check labs, CXR for ro fluid overload, reassess.

## 2024-01-04 NOTE — H&P ADULT - PROBLEM SELECTOR PLAN 3
TTE 11/22/23 w/ Hyperdynamic LVSF, EF >75%,  w/ cavity obliteration resulting in an intra-cavitary gradient of 66.00 mmHg w/ Valsalva maneuver; mild LVH; Dilated RV. MARISOL. Mod AS/TR. Mild MS/MR.  PulmHTN PASP 116 mmHg.  R/LHC 11/27/23: NOCAD,  Non obstructive CAD, Elevated filling pressures, No Severe AS or LVOT.  Plan: Repeat TTE, continue with Toprol 100 mg BID

## 2024-01-04 NOTE — ED ADULT NURSE NOTE - OBJECTIVE STATEMENT
78y F pmHx DM2, neuropathy, 78y F pmHx DM2, neuropathy, COPD presents to ED c/o worsening GREGORY. Pt reports recent admission for SOB, dc'ed home but pt endorses worsening GREGORY. Denies cough, worsening leg swelling, CP/SOB at rest., fever/chills, congestion/runny nose. redness noted to BLLE. Pt AAOx4, speaking in full and clear sentences, NAD at this time.

## 2024-01-05 DIAGNOSIS — I50.30 UNSPECIFIED DIASTOLIC (CONGESTIVE) HEART FAILURE: ICD-10-CM

## 2024-01-05 DIAGNOSIS — N17.9 ACUTE KIDNEY FAILURE, UNSPECIFIED: ICD-10-CM

## 2024-01-05 LAB
A1C WITH ESTIMATED AVERAGE GLUCOSE RESULT: 6.2 % — HIGH (ref 4–5.6)
A1C WITH ESTIMATED AVERAGE GLUCOSE RESULT: 6.2 % — HIGH (ref 4–5.6)
ALBUMIN SERPL ELPH-MCNC: 4 G/DL — SIGNIFICANT CHANGE UP (ref 3.3–5)
ALBUMIN SERPL ELPH-MCNC: 4 G/DL — SIGNIFICANT CHANGE UP (ref 3.3–5)
ALP SERPL-CCNC: 86 U/L — SIGNIFICANT CHANGE UP (ref 40–120)
ALP SERPL-CCNC: 86 U/L — SIGNIFICANT CHANGE UP (ref 40–120)
ALT FLD-CCNC: 14 U/L — SIGNIFICANT CHANGE UP (ref 10–45)
ALT FLD-CCNC: 14 U/L — SIGNIFICANT CHANGE UP (ref 10–45)
ANION GAP SERPL CALC-SCNC: 11 MMOL/L — SIGNIFICANT CHANGE UP (ref 5–17)
ANION GAP SERPL CALC-SCNC: 11 MMOL/L — SIGNIFICANT CHANGE UP (ref 5–17)
AST SERPL-CCNC: 13 U/L — SIGNIFICANT CHANGE UP (ref 10–40)
AST SERPL-CCNC: 13 U/L — SIGNIFICANT CHANGE UP (ref 10–40)
BILIRUB SERPL-MCNC: 1.6 MG/DL — HIGH (ref 0.2–1.2)
BILIRUB SERPL-MCNC: 1.6 MG/DL — HIGH (ref 0.2–1.2)
BUN SERPL-MCNC: 28 MG/DL — HIGH (ref 7–23)
BUN SERPL-MCNC: 28 MG/DL — HIGH (ref 7–23)
CALCIUM SERPL-MCNC: 9.8 MG/DL — SIGNIFICANT CHANGE UP (ref 8.4–10.5)
CALCIUM SERPL-MCNC: 9.8 MG/DL — SIGNIFICANT CHANGE UP (ref 8.4–10.5)
CHLORIDE SERPL-SCNC: 100 MMOL/L — SIGNIFICANT CHANGE UP (ref 96–108)
CHLORIDE SERPL-SCNC: 100 MMOL/L — SIGNIFICANT CHANGE UP (ref 96–108)
CHOLEST SERPL-MCNC: 135 MG/DL — SIGNIFICANT CHANGE UP
CHOLEST SERPL-MCNC: 135 MG/DL — SIGNIFICANT CHANGE UP
CO2 SERPL-SCNC: 26 MMOL/L — SIGNIFICANT CHANGE UP (ref 22–31)
CO2 SERPL-SCNC: 26 MMOL/L — SIGNIFICANT CHANGE UP (ref 22–31)
CREAT SERPL-MCNC: 1.57 MG/DL — HIGH (ref 0.5–1.3)
CREAT SERPL-MCNC: 1.57 MG/DL — HIGH (ref 0.5–1.3)
EGFR: 33 ML/MIN/1.73M2 — LOW
EGFR: 33 ML/MIN/1.73M2 — LOW
ESTIMATED AVERAGE GLUCOSE: 131 MG/DL — HIGH (ref 68–114)
ESTIMATED AVERAGE GLUCOSE: 131 MG/DL — HIGH (ref 68–114)
ETHANOL SERPL-MCNC: <10 MG/DL — SIGNIFICANT CHANGE UP (ref 0–10)
ETHANOL SERPL-MCNC: <10 MG/DL — SIGNIFICANT CHANGE UP (ref 0–10)
FERRITIN SERPL-MCNC: 124 NG/ML — SIGNIFICANT CHANGE UP (ref 13–330)
FERRITIN SERPL-MCNC: 124 NG/ML — SIGNIFICANT CHANGE UP (ref 13–330)
GLUCOSE BLDC GLUCOMTR-MCNC: 132 MG/DL — HIGH (ref 70–99)
GLUCOSE BLDC GLUCOMTR-MCNC: 132 MG/DL — HIGH (ref 70–99)
GLUCOSE BLDC GLUCOMTR-MCNC: 133 MG/DL — HIGH (ref 70–99)
GLUCOSE BLDC GLUCOMTR-MCNC: 133 MG/DL — HIGH (ref 70–99)
GLUCOSE BLDC GLUCOMTR-MCNC: 137 MG/DL — HIGH (ref 70–99)
GLUCOSE BLDC GLUCOMTR-MCNC: 137 MG/DL — HIGH (ref 70–99)
GLUCOSE BLDC GLUCOMTR-MCNC: 147 MG/DL — HIGH (ref 70–99)
GLUCOSE BLDC GLUCOMTR-MCNC: 147 MG/DL — HIGH (ref 70–99)
GLUCOSE SERPL-MCNC: 125 MG/DL — HIGH (ref 70–99)
GLUCOSE SERPL-MCNC: 125 MG/DL — HIGH (ref 70–99)
HCT VFR BLD CALC: 35.7 % — SIGNIFICANT CHANGE UP (ref 34.5–45)
HCT VFR BLD CALC: 35.7 % — SIGNIFICANT CHANGE UP (ref 34.5–45)
HDLC SERPL-MCNC: 46 MG/DL — LOW
HDLC SERPL-MCNC: 46 MG/DL — LOW
HGB BLD-MCNC: 11.2 G/DL — LOW (ref 11.5–15.5)
HGB BLD-MCNC: 11.2 G/DL — LOW (ref 11.5–15.5)
IRON SATN MFR SERPL: 19 % — SIGNIFICANT CHANGE UP (ref 14–50)
IRON SATN MFR SERPL: 19 % — SIGNIFICANT CHANGE UP (ref 14–50)
IRON SATN MFR SERPL: 62 UG/DL — SIGNIFICANT CHANGE UP (ref 30–160)
IRON SATN MFR SERPL: 62 UG/DL — SIGNIFICANT CHANGE UP (ref 30–160)
LIPID PNL WITH DIRECT LDL SERPL: 74 MG/DL — SIGNIFICANT CHANGE UP
LIPID PNL WITH DIRECT LDL SERPL: 74 MG/DL — SIGNIFICANT CHANGE UP
MAGNESIUM SERPL-MCNC: 2 MG/DL — SIGNIFICANT CHANGE UP (ref 1.6–2.6)
MAGNESIUM SERPL-MCNC: 2 MG/DL — SIGNIFICANT CHANGE UP (ref 1.6–2.6)
MCHC RBC-ENTMCNC: 31 PG — SIGNIFICANT CHANGE UP (ref 27–34)
MCHC RBC-ENTMCNC: 31 PG — SIGNIFICANT CHANGE UP (ref 27–34)
MCHC RBC-ENTMCNC: 31.4 GM/DL — LOW (ref 32–36)
MCHC RBC-ENTMCNC: 31.4 GM/DL — LOW (ref 32–36)
MCV RBC AUTO: 98.9 FL — SIGNIFICANT CHANGE UP (ref 80–100)
MCV RBC AUTO: 98.9 FL — SIGNIFICANT CHANGE UP (ref 80–100)
NON HDL CHOLESTEROL: 89 MG/DL — SIGNIFICANT CHANGE UP
NON HDL CHOLESTEROL: 89 MG/DL — SIGNIFICANT CHANGE UP
NRBC # BLD: 0 /100 WBCS — SIGNIFICANT CHANGE UP (ref 0–0)
NRBC # BLD: 0 /100 WBCS — SIGNIFICANT CHANGE UP (ref 0–0)
PLATELET # BLD AUTO: 187 K/UL — SIGNIFICANT CHANGE UP (ref 150–400)
PLATELET # BLD AUTO: 187 K/UL — SIGNIFICANT CHANGE UP (ref 150–400)
POTASSIUM SERPL-MCNC: 4 MMOL/L — SIGNIFICANT CHANGE UP (ref 3.5–5.3)
POTASSIUM SERPL-MCNC: 4 MMOL/L — SIGNIFICANT CHANGE UP (ref 3.5–5.3)
POTASSIUM SERPL-SCNC: 4 MMOL/L — SIGNIFICANT CHANGE UP (ref 3.5–5.3)
POTASSIUM SERPL-SCNC: 4 MMOL/L — SIGNIFICANT CHANGE UP (ref 3.5–5.3)
PROT SERPL-MCNC: 6.6 G/DL — SIGNIFICANT CHANGE UP (ref 6–8.3)
PROT SERPL-MCNC: 6.6 G/DL — SIGNIFICANT CHANGE UP (ref 6–8.3)
RBC # BLD: 3.61 M/UL — LOW (ref 3.8–5.2)
RBC # BLD: 3.61 M/UL — LOW (ref 3.8–5.2)
RBC # FLD: 19.6 % — HIGH (ref 10.3–14.5)
RBC # FLD: 19.6 % — HIGH (ref 10.3–14.5)
SODIUM SERPL-SCNC: 137 MMOL/L — SIGNIFICANT CHANGE UP (ref 135–145)
SODIUM SERPL-SCNC: 137 MMOL/L — SIGNIFICANT CHANGE UP (ref 135–145)
TIBC SERPL-MCNC: 333 UG/DL — SIGNIFICANT CHANGE UP (ref 220–430)
TIBC SERPL-MCNC: 333 UG/DL — SIGNIFICANT CHANGE UP (ref 220–430)
TRIGL SERPL-MCNC: 76 MG/DL — SIGNIFICANT CHANGE UP
TRIGL SERPL-MCNC: 76 MG/DL — SIGNIFICANT CHANGE UP
UIBC SERPL-MCNC: 271 UG/DL — SIGNIFICANT CHANGE UP (ref 110–370)
UIBC SERPL-MCNC: 271 UG/DL — SIGNIFICANT CHANGE UP (ref 110–370)
WBC # BLD: 8.73 K/UL — SIGNIFICANT CHANGE UP (ref 3.8–10.5)
WBC # BLD: 8.73 K/UL — SIGNIFICANT CHANGE UP (ref 3.8–10.5)
WBC # FLD AUTO: 8.73 K/UL — SIGNIFICANT CHANGE UP (ref 3.8–10.5)
WBC # FLD AUTO: 8.73 K/UL — SIGNIFICANT CHANGE UP (ref 3.8–10.5)

## 2024-01-05 PROCEDURE — 93306 TTE W/DOPPLER COMPLETE: CPT | Mod: 26

## 2024-01-05 PROCEDURE — 99233 SBSQ HOSP IP/OBS HIGH 50: CPT

## 2024-01-05 PROCEDURE — 93925 LOWER EXTREMITY STUDY: CPT | Mod: 26

## 2024-01-05 RX ORDER — GLUCAGON INJECTION, SOLUTION 0.5 MG/.1ML
1 INJECTION, SOLUTION SUBCUTANEOUS ONCE
Refills: 0 | Status: DISCONTINUED | OUTPATIENT
Start: 2024-01-05 | End: 2024-01-08

## 2024-01-05 RX ORDER — DEXTROSE 50 % IN WATER 50 %
25 SYRINGE (ML) INTRAVENOUS ONCE
Refills: 0 | Status: DISCONTINUED | OUTPATIENT
Start: 2024-01-05 | End: 2024-01-08

## 2024-01-05 RX ORDER — DEXTROSE 50 % IN WATER 50 %
15 SYRINGE (ML) INTRAVENOUS ONCE
Refills: 0 | Status: DISCONTINUED | OUTPATIENT
Start: 2024-01-05 | End: 2024-01-08

## 2024-01-05 RX ORDER — DEXTROSE 50 % IN WATER 50 %
12.5 SYRINGE (ML) INTRAVENOUS ONCE
Refills: 0 | Status: DISCONTINUED | OUTPATIENT
Start: 2024-01-05 | End: 2024-01-08

## 2024-01-05 RX ORDER — INSULIN LISPRO 100/ML
VIAL (ML) SUBCUTANEOUS
Refills: 0 | Status: DISCONTINUED | OUTPATIENT
Start: 2024-01-05 | End: 2024-01-08

## 2024-01-05 RX ORDER — SODIUM CHLORIDE 9 MG/ML
1000 INJECTION, SOLUTION INTRAVENOUS
Refills: 0 | Status: DISCONTINUED | OUTPATIENT
Start: 2024-01-05 | End: 2024-01-08

## 2024-01-05 RX ADMIN — PREGABALIN 1000 MICROGRAM(S): 225 CAPSULE ORAL at 12:35

## 2024-01-05 RX ADMIN — NYSTATIN CREAM 1 APPLICATION(S): 100000 CREAM TOPICAL at 05:51

## 2024-01-05 RX ADMIN — Medication 40 MILLIGRAM(S): at 05:53

## 2024-01-05 RX ADMIN — SPIRONOLACTONE 25 MILLIGRAM(S): 25 TABLET, FILM COATED ORAL at 05:51

## 2024-01-05 RX ADMIN — Medication 40 MILLIGRAM(S): at 13:06

## 2024-01-05 RX ADMIN — NYSTATIN CREAM 1 APPLICATION(S): 100000 CREAM TOPICAL at 17:41

## 2024-01-05 RX ADMIN — Medication 100 MILLIGRAM(S): at 17:41

## 2024-01-05 RX ADMIN — ZOLPIDEM TARTRATE 5 MILLIGRAM(S): 10 TABLET ORAL at 00:26

## 2024-01-05 RX ADMIN — ATORVASTATIN CALCIUM 40 MILLIGRAM(S): 80 TABLET, FILM COATED ORAL at 23:36

## 2024-01-05 RX ADMIN — Medication 100 MILLIGRAM(S): at 05:51

## 2024-01-05 RX ADMIN — OXYMETAZOLINE HYDROCHLORIDE 1 SPRAY(S): 0.5 SPRAY NASAL at 00:26

## 2024-01-05 RX ADMIN — PANTOPRAZOLE SODIUM 40 MILLIGRAM(S): 20 TABLET, DELAYED RELEASE ORAL at 05:53

## 2024-01-05 RX ADMIN — RIVAROXABAN 20 MILLIGRAM(S): KIT at 17:41

## 2024-01-05 RX ADMIN — OXYMETAZOLINE HYDROCHLORIDE 1 SPRAY(S): 0.5 SPRAY NASAL at 17:41

## 2024-01-05 RX ADMIN — DAPAGLIFLOZIN 10 MILLIGRAM(S): 10 TABLET, FILM COATED ORAL at 12:35

## 2024-01-05 NOTE — PROGRESS NOTE ADULT - PROBLEM SELECTOR PLAN 1
- SOB w /minimal exertion x2days; BNP 5822.   - DIURETIC: Lasix 40mg IV BID.    - GDMT: Farxiga 10mg PO QD, Spironolactone 25mg PO QD.   - I/O: net net 1.5L/24hr    - PLAN: cont IV diuresis. Repeat TTE to eval valvular disease as below).

## 2024-01-05 NOTE — PROGRESS NOTE ADULT - NS ATTEND AMEND GEN_ALL_CORE FT
77 yo lady PMHx of non-obstructive CAD ([facility] ) paroxysmal Afib on rivaroxaban, HFpEF, DM2 c/b diabetic neuropathy, and LE lymphedema who is admitted for acute on chronic decompensated HFpEF exacerbation.    Recent admission @ [facility]   At that time, TTE w/ hyperdynamic LV w/ intra-cavitary gradient 66 mmHg w/ valsalva and elevated PASP  However, in the cath lab, no significant LVOT gradient was noted. Peak-peak aortic gradient @ rest was 5 mmHg. Mitral valve gradient 3 mmHg.  RHC did reveal RA 8, RV 65/11, PA 75/25, PCWP 20, and Loretta CO/CI 3.7/2.2 c/w HFpEF    Assessment  1. Acute on chronic decompensated HFpEF exacerbation  2. Paroxysmal Afib - in NSR  3. LE edema c/w chronic lymphedema  4. Non-obstructive CAD  5. Paradoxical low flow low gradient severe AS w/ normal EF  6. Moderate TR    Plan  1. IV lasix 40mg BID one more day w/ strict I/O and daily standing weight  2. HFpEF: Aldactone 25mg PO QD and SGLT2i  3. Metoprolol tartrate 100mg BID for Afib  4. Xarelto at current dose for systemic embolization prevention  5. High intensity statin for HLD + CAD  6. Will order LE arterial duplex b/l to r/o PAD (low suspicion but DP/PT difficult to palpate 2/2 edema)  7. When euvolemic, will obtain low-dose dobutamine stress test for paradoxical low flow low gradient severe AS w/ normal EF; patient is in NSR and can undergo low dose  stress test    During non face-to-face time, I reviewed relevant portions of the patient’s medical record. During face-to-face time, I took a relevant history and examined the patient. I also explained differential diagnoses, relevant cardiac diagnoses, workup, and management plan, which required a high level of medical decision making. I answered all questions related to the patient's medical conditions.     Eva Schneider M.D.  CARDIOLOGY ATTENDING. 77 yo lady PMHx of non-obstructive CAD ([facility] ) paroxysmal Afib on rivaroxaban, HFpEF, DM2 c/b diabetic neuropathy, and LE lymphedema who is admitted for acute on chronic decompensated HFpEF exacerbation.    Recent admission @ [facility]   At that time, TTE w/ hyperdynamic LV w/ intra-cavitary gradient 66 mmHg w/ valsalva and elevated PASP  However, in the cath lab, no significant LVOT gradient was noted. Peak-peak aortic gradient @ rest was 5 mmHg. Mitral valve gradient 3 mmHg.  RHC did reveal RA 8, RV 65/11, PA 75/25, PCWP 20, and [place] CO/CI 3.7/2.2 c/w HFpEF    Assessment  1. Acute on chronic decompensated HFpEF exacerbation  2. Paroxysmal Afib - in NSR  3. LE edema c/w chronic lymphedema  4. Non-obstructive CAD  5. Paradoxical low flow low gradient severe AS w/ normal EF  6. Moderate TR    Plan  1. IV lasix 40mg BID one more day w/ strict I/O and daily standing weight  2. HFpEF: Aldactone 25mg PO QD and SGLT2i  3. Metoprolol tartrate 100mg BID for Afib  4. Xarelto at current dose for systemic embolization prevention  5. High intensity statin for HLD + CAD  6. Will order LE arterial duplex b/l to r/o PAD (low suspicion but DP/PT difficult to palpate 2/2 edema)  7. When euvolemic, will obtain low-dose dobutamine stress test for paradoxical low flow low gradient severe AS w/ normal EF; patient is in NSR and can undergo low dose  stress test    During non face-to-face time, I reviewed relevant portions of the patient’s medical record. During face-to-face time, I took a relevant history and examined the patient. I also explained differential diagnoses, relevant cardiac diagnoses, workup, and management plan, which required a high level of medical decision making. I answered all questions related to the patient's medical conditions.     Eva Schneider M.D.  CARDIOLOGY ATTENDING.

## 2024-01-05 NOTE — PROGRESS NOTE ADULT - PROBLEM SELECTOR PLAN 3
- Reports 1-2 pints of vodka daily, last drink 1-4-23.    - Continue [facility] protocol. - Reports 1-2 pints of vodka daily, last drink 1-4-23.    - Continue [facility] protocol.

## 2024-01-05 NOTE — PROGRESS NOTE ADULT - SUBJECTIVE AND OBJECTIVE BOX
80-year-old man with PMHx of HTN, DM, DLD, GERD, prostate cancer (sp seed implant in 2008), pAfib, Hx of  SVT follows with Dr. Motta presenting to the ED for evaluation of DEJESUS found to be covid positive.    The patient was admitted for SOB on exertion and was covid positive. the patient  were recently admitted for SVTs and and refused to do the cardioversion. EP saw the patient and recommended that he needs MCOT prior to dicharge. The patient is medically stable to be discharged.  # DEJESUS 2/2 covid vs ?PE  - CT PE nondiagnostic   - f/u inflammatory markers  - negative stress this week    # Hx SVT s/p DCCV last admission  # pAF  - EP cs for MCOT considering frequency of sxs  - titrate metoprolol to 25 tid  - ASA / statin     # hx R basial nodular density   - keep OP pulm appointment    # DM type II   # Dyslipidemia   - statin   - c/c diet F/S monitoring and ISS    # Anemia - normocytic     # CKD IIIa  - stable, trend bmp    # H/o prostate cancer s/p RT/brachytherapy   - c/w home Flomax        History of Present Illness:   80-year-old man with PMHx of HTN, DM, DLD, GERD, prostate cancer (sp seed implant in 2008), pAfib, Hx of  SVT follows with Dr. Motta presenting to the ED for evaluation of acute dyspnea on exertion that began this morning. Pt was a recent admit/dc 6/29 to 7/1 for similar complaint; on last admission treated for sxs SVT; pt refused a/c; had negative ischemic w/u; made appointment for ILP w/ EP.     Per pt he was in his usual state of health but this AM on ambulation noted DEJESUS; said it was similar in quality to sxs he presented for last admission. At rest pt says he is not SOB at all. Denies subjective fevers; n/v/d; denies cp, palpitations;     Of note pt is fully covid vaxed    ED   /74  RR 18 98% RA T 98.5 Tmax 101.5  Labs notable for a lactate of 3.9 on vBG  CT PE inconclusive for PE; otherwise unchanged from prior  Covid positive (not tested prior admission)    Pt was given cef/vanc and 500cc bolus and admitted to tele   Interventional Cardiology PA Adult Progress Note    Subjective Assessment: Pt feels well today, still reports significant GREGORY w/ minimal ambulation.  	  MEDICATIONS:  furosemide   Injectable 40 milliGRAM(s) IV Push two times a day  metoprolol tartrate 100 milliGRAM(s) Oral two times a day  spironolactone 25 milliGRAM(s) Oral daily  LORazepam   Injectable 1 milliGRAM(s) IV Push every 1 hour PRN  zolpidem 5 milliGRAM(s) Oral at bedtime PRN  pantoprazole    Tablet 40 milliGRAM(s) Oral before breakfast  atorvastatin 40 milliGRAM(s) Oral at bedtime  dapagliflozin 10 milliGRAM(s) Oral every 24 hours  dextrose 50% Injectable 12.5 Gram(s) IV Push once  dextrose 50% Injectable 25 Gram(s) IV Push once  dextrose 50% Injectable 25 Gram(s) IV Push once  dextrose Oral Gel 15 Gram(s) Oral once PRN  glucagon  Injectable 1 milliGRAM(s) IntraMuscular once  insulin lispro (ADMELOG) corrective regimen sliding scale   SubCutaneous Before meals and at bedtime  cyanocobalamin 1000 MICROGram(s) Oral daily  dextrose 5%. 1000 milliLiter(s) IV Continuous <Continuous>  dextrose 5%. 1000 milliLiter(s) IV Continuous <Continuous>  nystatin Powder 1 Application(s) Topical two times a day  oxymetazoline 0.05% Nasal Spray 1 Spray(s) Both Nostrils two times a day  rivaroxaban 20 milliGRAM(s) Oral with dinner    PHYSICAL EXAM:  TELEMETRY:  T(C): 36.9 (01-05-24 @ 05:49), Max: 36.9 (01-05-24 @ 05:49)  HR: 68 (01-05-24 @ 09:07) (56 - 68)  BP: 117/51 (01-05-24 @ 09:07) (117/51 - 148/62)  RR: 20 (01-05-24 @ 09:07) (17 - 20)  SpO2: 98% (01-05-24 @ 09:07) (87% - 98%)  Wt(kg): --  I&O's Summary    04 Jan 2024 07:01  -  05 Jan 2024 07:00  --------------------------------------------------------  IN: 500 mL / OUT: 2000 mL / NET: -1500 mL    05 Jan 2024 07:01  -  05 Jan 2024 11:59  --------------------------------------------------------  IN: 0 mL / OUT: 500 mL / NET: -500 mL      Height (cm): 152.4 (01-04 @ 14:39)  Weight (kg): 83.3 (01-04 @ 14:39)  BMI (kg/m2): 35.9 (01-04 @ 14:39)  BSA (m2): 1.8 (01-04 @ 14:39)                               Appearance: Normal	  HEENT:   Normal oral mucosa, PERRL, EOMI	  Neck: Supple, + JVD/ - JVD; Carotid Bruit   Cardiovascular: Normal S1 S2, No JVD, No murmurs,   Respiratory: Lungs clear to auscultation/Decreased Breath Sounds/No Rales, Rhonchi, Wheezing	  Gastrointestinal:  Soft, Non-tender, + BS	  Skin: No rashes, No ecchymoses, No cyanosis  Extremities: Normal range of motion, No clubbing, cyanosis or edema  Vascular: Peripheral pulses palpable 2+ bilaterally  Neurologic: Non-focal  Psychiatry: A & O x 3, Mood & affect appropriate        CARDIAC MARKERS:                        11.2   8.73  )-----------( 187      ( 05 Jan 2024 05:30 )             35.7     137  |  100  |  28<H>  ----------------------------<  125<H>  4.0   |  26  |  1.57<H>    Ca    9.8      05 Jan 2024 05:30    Mg     2.0     01-05    TPro  6.6  /  Alb  4.0  /  TBili  1.6<H>  /  DBili  x   /  AST  13  /  ALT  14  /  AlkPhos  86  01-05       History of Present Illness:   80-year-old man with PMHx of HTN, DM, DLD, GERD, prostate cancer (sp seed implant in 2008), pAfib, Hx of  SVT follows with Dr. Motta presenting to the ED for evaluation of acute dyspnea on exertion that began this morning. Pt was recently admitted 6/29 to 7/1 for similar complaint; on last admission treated for sxs SVT; pt refused a/c; had negative ischemic w/u; made appointment for ILP w/ EP. Per pt he was in his usual state of health but this AM on ambulation noted DEJESUS; said it was similar in quality to sxs he presented for last admission. At rest pt says he is not SOB at all. Denies subjective fevers; n/v/d; denies cp, palpitations. Of note pt is fully covid vaxed    In the ED, /74  RR 18 98% RA T 98.5 Tmax 101.5. Labs notable for a lactate of 3.9 on vBG, d-dimer 245, procal 0.7. CT PE inconclusive for PE; otherwise unchanged from prior. Found to be COVID positive. Pt was given ceftriaxone and vancomycin, given a 500cc bolus and admitted to telemetry.     #Dyspnea  - Likely Secondary to COVID  (mild, without O2 requirement)  - Isolation precautions (contact, droplet, airborne)  - s/p steroids, pt refused remdesivir   -pt not hypoxic, breathing comfortably on RA   -Afebrile, inflammatory markers downtrended     #SVT s/p DCCV last admission, pAF  - EP - MCOT placed prior to discharge  - HR controlled  - metoprolol increased to three times daily, will increase BID dose for ease of adherence  - ASA / statin   -recent echo with 58%  -Outpatient appointment with Dr. Coughlin scheduled for 7/21 @2pm.     #Thrombocytopenia   ->131->106->100->86 ->75->70 -> 73  -Pt has h/o ITP s/p rituximab therapy (20 year ago)  -Heme: likely 2/2 COVID. No administration of heparin this admission, HIT/delayed HIT unlikely. LE duplex negative. Discharge with outpatient hem/onc followup with Dr. Mcintosh.     #R basial nodular density   - keep OP pulm appointment    #DM type II   Hold oral meds;  check fs;  Start insulin  regimen if  serum Glucose >180, start insulin  protocol and adjust insulin  Lantus/Lispro,  Hold for Hypoglycemia Goal  Goal 140-180     #Dyslipidemia   - c/w statin     #CKD IIIa  - stable, trend bmp    #prostate cancer s/p RT/brachytherapy   - c/w home Flomax

## 2024-01-05 NOTE — PROGRESS NOTE ADULT - PROBLEM SELECTOR PLAN 2
-TTE (11/22/23): LVEF > 75% w/ cavitary obliteration w/ intra-cavitary gradient (66mmHg w/ valsalva), mild LVH, dilated RV, MARISOL, mod AS/TR, mild MS/MR, pulmHTN w/ PASP 116mmHg.    - R/C 11/27/23 w/ non-obstructive CAD, elevated filling pressures, no severe AS or LVOT.    - PLAN: repeat TTE as above (CTSx previously consulted and no intervention recommended).

## 2024-01-05 NOTE — PROGRESS NOTE ADULT - ASSESSMENT
78F daily ETOH drinker, HTN, HLD, AFib (on Xarelto), chronic venous stasis, DMT2 w peripheral neuropathy, Hx of uterine CA who was admitted to [facility] 11/21-11/30/23 for ADHFpEF with intracavitary gradient found on TTE with elevated filling pressures on L/RHC on 11/27 and hypoxia sent in by her cardiologist for SOB with minimal exertion. Admitted to cardiac telemetry for HFpEF exacerbation, repeat TTE pending and diuresing with Lasix 40mg IV BID.     78F daily ETOH drinker, HTN, HLD, AFib (on Xarelto), chronic venous stasis, DMT2 w peripheral neuropathy, Hx of uterine CA who was admitted to [facility] 11/21-11/30/23 for ADHFpEF with intracavitary gradient found on TTE with elevated filling pressures on L/RHC on 11/27 and hypoxia sent in by her cardiologist for SOB with minimal exertion. Admitted to cardiac telemetry for HFpEF exacerbation, repeat TTE pending and diuresing with Lasix 40mg IV BID.

## 2024-01-05 NOTE — PROGRESS NOTE ADULT - PROBLEM SELECTOR PLAN 4
- Cr 1.3 -> 1.57; suspecting in setting of renal congestion.   - Continue diuresis as above and monitor renal function.

## 2024-01-06 LAB
ANION GAP SERPL CALC-SCNC: 10 MMOL/L — SIGNIFICANT CHANGE UP (ref 5–17)
ANION GAP SERPL CALC-SCNC: 10 MMOL/L — SIGNIFICANT CHANGE UP (ref 5–17)
BUN SERPL-MCNC: 32 MG/DL — HIGH (ref 7–23)
BUN SERPL-MCNC: 32 MG/DL — HIGH (ref 7–23)
CALCIUM SERPL-MCNC: 9.4 MG/DL — SIGNIFICANT CHANGE UP (ref 8.4–10.5)
CALCIUM SERPL-MCNC: 9.4 MG/DL — SIGNIFICANT CHANGE UP (ref 8.4–10.5)
CHLORIDE SERPL-SCNC: 100 MMOL/L — SIGNIFICANT CHANGE UP (ref 96–108)
CHLORIDE SERPL-SCNC: 100 MMOL/L — SIGNIFICANT CHANGE UP (ref 96–108)
CO2 SERPL-SCNC: 32 MMOL/L — HIGH (ref 22–31)
CO2 SERPL-SCNC: 32 MMOL/L — HIGH (ref 22–31)
CREAT SERPL-MCNC: 1.77 MG/DL — HIGH (ref 0.5–1.3)
CREAT SERPL-MCNC: 1.77 MG/DL — HIGH (ref 0.5–1.3)
EGFR: 29 ML/MIN/1.73M2 — LOW
EGFR: 29 ML/MIN/1.73M2 — LOW
GLUCOSE BLDC GLUCOMTR-MCNC: 121 MG/DL — HIGH (ref 70–99)
GLUCOSE BLDC GLUCOMTR-MCNC: 121 MG/DL — HIGH (ref 70–99)
GLUCOSE BLDC GLUCOMTR-MCNC: 125 MG/DL — HIGH (ref 70–99)
GLUCOSE BLDC GLUCOMTR-MCNC: 125 MG/DL — HIGH (ref 70–99)
GLUCOSE BLDC GLUCOMTR-MCNC: 149 MG/DL — HIGH (ref 70–99)
GLUCOSE BLDC GLUCOMTR-MCNC: 149 MG/DL — HIGH (ref 70–99)
GLUCOSE BLDC GLUCOMTR-MCNC: 155 MG/DL — HIGH (ref 70–99)
GLUCOSE BLDC GLUCOMTR-MCNC: 155 MG/DL — HIGH (ref 70–99)
GLUCOSE SERPL-MCNC: 129 MG/DL — HIGH (ref 70–99)
GLUCOSE SERPL-MCNC: 129 MG/DL — HIGH (ref 70–99)
HCT VFR BLD CALC: 36.1 % — SIGNIFICANT CHANGE UP (ref 34.5–45)
HCT VFR BLD CALC: 36.1 % — SIGNIFICANT CHANGE UP (ref 34.5–45)
HGB BLD-MCNC: 11.2 G/DL — LOW (ref 11.5–15.5)
HGB BLD-MCNC: 11.2 G/DL — LOW (ref 11.5–15.5)
MAGNESIUM SERPL-MCNC: 1.8 MG/DL — SIGNIFICANT CHANGE UP (ref 1.6–2.6)
MAGNESIUM SERPL-MCNC: 1.8 MG/DL — SIGNIFICANT CHANGE UP (ref 1.6–2.6)
MCHC RBC-ENTMCNC: 30.6 PG — SIGNIFICANT CHANGE UP (ref 27–34)
MCHC RBC-ENTMCNC: 30.6 PG — SIGNIFICANT CHANGE UP (ref 27–34)
MCHC RBC-ENTMCNC: 31 GM/DL — LOW (ref 32–36)
MCHC RBC-ENTMCNC: 31 GM/DL — LOW (ref 32–36)
MCV RBC AUTO: 98.6 FL — SIGNIFICANT CHANGE UP (ref 80–100)
MCV RBC AUTO: 98.6 FL — SIGNIFICANT CHANGE UP (ref 80–100)
NRBC # BLD: 0 /100 WBCS — SIGNIFICANT CHANGE UP (ref 0–0)
NRBC # BLD: 0 /100 WBCS — SIGNIFICANT CHANGE UP (ref 0–0)
PLATELET # BLD AUTO: 168 K/UL — SIGNIFICANT CHANGE UP (ref 150–400)
PLATELET # BLD AUTO: 168 K/UL — SIGNIFICANT CHANGE UP (ref 150–400)
POTASSIUM SERPL-MCNC: 3.6 MMOL/L — SIGNIFICANT CHANGE UP (ref 3.5–5.3)
POTASSIUM SERPL-MCNC: 3.6 MMOL/L — SIGNIFICANT CHANGE UP (ref 3.5–5.3)
POTASSIUM SERPL-SCNC: 3.6 MMOL/L — SIGNIFICANT CHANGE UP (ref 3.5–5.3)
POTASSIUM SERPL-SCNC: 3.6 MMOL/L — SIGNIFICANT CHANGE UP (ref 3.5–5.3)
RBC # BLD: 3.66 M/UL — LOW (ref 3.8–5.2)
RBC # BLD: 3.66 M/UL — LOW (ref 3.8–5.2)
RBC # FLD: 19.6 % — HIGH (ref 10.3–14.5)
RBC # FLD: 19.6 % — HIGH (ref 10.3–14.5)
SODIUM SERPL-SCNC: 142 MMOL/L — SIGNIFICANT CHANGE UP (ref 135–145)
SODIUM SERPL-SCNC: 142 MMOL/L — SIGNIFICANT CHANGE UP (ref 135–145)
WBC # BLD: 8.35 K/UL — SIGNIFICANT CHANGE UP (ref 3.8–10.5)
WBC # BLD: 8.35 K/UL — SIGNIFICANT CHANGE UP (ref 3.8–10.5)
WBC # FLD AUTO: 8.35 K/UL — SIGNIFICANT CHANGE UP (ref 3.8–10.5)
WBC # FLD AUTO: 8.35 K/UL — SIGNIFICANT CHANGE UP (ref 3.8–10.5)

## 2024-01-06 PROCEDURE — 99222 1ST HOSP IP/OBS MODERATE 55: CPT

## 2024-01-06 PROCEDURE — 99233 SBSQ HOSP IP/OBS HIGH 50: CPT

## 2024-01-06 RX ADMIN — OXYMETAZOLINE HYDROCHLORIDE 1 SPRAY(S): 0.5 SPRAY NASAL at 07:10

## 2024-01-06 RX ADMIN — PANTOPRAZOLE SODIUM 40 MILLIGRAM(S): 20 TABLET, DELAYED RELEASE ORAL at 07:09

## 2024-01-06 RX ADMIN — Medication 40 MILLIGRAM(S): at 07:08

## 2024-01-06 RX ADMIN — ZOLPIDEM TARTRATE 5 MILLIGRAM(S): 10 TABLET ORAL at 00:44

## 2024-01-06 RX ADMIN — Medication 100 MILLIGRAM(S): at 17:43

## 2024-01-06 RX ADMIN — OXYMETAZOLINE HYDROCHLORIDE 1 SPRAY(S): 0.5 SPRAY NASAL at 17:47

## 2024-01-06 RX ADMIN — ZOLPIDEM TARTRATE 5 MILLIGRAM(S): 10 TABLET ORAL at 23:48

## 2024-01-06 RX ADMIN — RIVAROXABAN 20 MILLIGRAM(S): KIT at 17:43

## 2024-01-06 RX ADMIN — PREGABALIN 1000 MICROGRAM(S): 225 CAPSULE ORAL at 11:22

## 2024-01-06 RX ADMIN — NYSTATIN CREAM 1 APPLICATION(S): 100000 CREAM TOPICAL at 17:47

## 2024-01-06 RX ADMIN — Medication 100 MILLIGRAM(S): at 07:08

## 2024-01-06 RX ADMIN — DAPAGLIFLOZIN 10 MILLIGRAM(S): 10 TABLET, FILM COATED ORAL at 12:13

## 2024-01-06 RX ADMIN — NYSTATIN CREAM 1 APPLICATION(S): 100000 CREAM TOPICAL at 07:09

## 2024-01-06 RX ADMIN — SPIRONOLACTONE 25 MILLIGRAM(S): 25 TABLET, FILM COATED ORAL at 07:08

## 2024-01-06 RX ADMIN — Medication 2: at 22:12

## 2024-01-06 RX ADMIN — ATORVASTATIN CALCIUM 40 MILLIGRAM(S): 80 TABLET, FILM COATED ORAL at 22:03

## 2024-01-06 NOTE — PROGRESS NOTE ADULT - PROBLEM SELECTOR PLAN 4
- Cr 1.3 -> 1.57 -> 1.77; likely due to diuresis and patient now dry on exam.   - Continue diuresis as above and monitor renal function.

## 2024-01-06 NOTE — PROGRESS NOTE ADULT - NS ATTEND AMEND GEN_ALL_CORE FT
79 yo lady PMHx of non-obstructive CAD ([facility] ) paroxysmal Afib on rivaroxaban, HFpEF, DM2 c/b diabetic neuropathy, and LE lymphedema who is admitted for acute on chronic decompensated HFpEF exacerbation.    Recent admission @ [facility]   At that time, TTE w/ hyperdynamic LV w/ intra-cavitary gradient 66 mmHg w/ valsalva and elevated PASP  However, in the cath lab, no significant LVOT gradient was noted. Peak-peak aortic gradient @ rest was 5 mmHg. Mitral valve gradient 3 mmHg.  RHC did reveal RA 8, RV 65/11, PA 75/25, PCWP 20, and Loretta CO/CI 3.7/2.2 c/w HFpEF    Assessment  1. Acute on chronic decompensated HFpEF exacerbation  No further JVD  Pitting edema of LE resolved  2. Paroxysmal Afib - in NSR  3. LE edema c/w chronic lymphedema  4. Non-obstructive CAD  5. Paradoxical low flow low gradient severe AS w/ normal EF  6. Moderate TR    Plan  1. Discontinue diuretics  2. HFpEF: Aldactone 25mg PO QD and SGLT2i  3. Metoprolol tartrate 100mg BID for Afib  4. Xarelto at current dose for systemic embolization prevention  5. High intensity statin for HLD + CAD  6. Will order LE arterial duplex b/l to r/o PAD   7. On Monday, will obtain low-dose dobutamine stress test for paradoxical low flow low gradient severe AS w/ normal EF; patient is in NSR and can undergo low dose  stress test; if negative will go home Monday    During non face-to-face time, I reviewed relevant portions of the patient’s medical record. During face-to-face time, I took a relevant history and examined the patient. I also explained differential diagnoses, relevant cardiac diagnoses, workup, and management plan, which required a high level of medical decision making. I answered all questions related to the patient's medical conditions.     Eva Schneider M.D.  CARDIOLOGY ATTENDING. 79 yo lady PMHx of non-obstructive CAD ([facility] ) paroxysmal Afib on rivaroxaban, HFpEF, DM2 c/b diabetic neuropathy, and LE lymphedema who is admitted for acute on chronic decompensated HFpEF exacerbation.    Recent admission @ [facility]   At that time, TTE w/ hyperdynamic LV w/ intra-cavitary gradient 66 mmHg w/ valsalva and elevated PASP  However, in the cath lab, no significant LVOT gradient was noted. Peak-peak aortic gradient @ rest was 5 mmHg. Mitral valve gradient 3 mmHg.  RHC did reveal RA 8, RV 65/11, PA 75/25, PCWP 20, and Loretta CO/CI 3.7/2.2 c/w HFpEF    Assessment  1. Acute on chronic decompensated HFpEF exacerbation  No further JVD  Pitting edema of LE resolved  2. Paroxysmal Afib - in NSR  3. LE edema c/w chronic lymphedema  4. Non-obstructive CAD  5. Paradoxical low flow low gradient severe AS w/ normal EF  6. Moderate TR    Plan  1. Discontinue diuretics  2. HFpEF: Aldactone 25mg PO QD and SGLT2i  3. Metoprolol tartrate 100mg BID for Afib  4. Xarelto at current dose for systemic embolization prevention  5. High intensity statin for HLD + CAD  6. Will order LE arterial duplex b/l to r/o PAD   7. On Monday, will obtain low-dose dobutamine stress test for paradoxical low flow low gradient severe AS w/ normal EF; patient is in NSR and can undergo low dose  stress test; if negative will go home Monday    During non face-to-face time, I reviewed relevant portions of the patient’s medical record. During face-to-face time, I took a relevant history and examined the patient. I also explained differential diagnoses, relevant cardiac diagnoses, workup, and management plan, which required a high level of medical decision making. I answered all questions related to the patient's medical conditions.     Eva Schneider M.D.  CARDIOLOGY ATTENDING.

## 2024-01-06 NOTE — PROGRESS NOTE ADULT - ASSESSMENT
78F daily ETOH drinker, HTN, HLD, AFib (on Xarelto), chronic venous stasis, DMT2 w peripheral neuropathy, Hx of uterine CA who was admitted to [facility] 11/21-11/30/23 for ADHFpEF with intracavitary gradient found on TTE with elevated filling pressures on L/RHC on 11/27 and hypoxia sent in by her cardiologist for SOB with minimal exertion. Admitted to cardiac telemetry for HFpEF exacerbation. Lasix IV discontinued - Cr uptrending and likely dry. TTE revealing paradoxical low-flow low-gradient severe AS and normal LVEF - will plan for Dobutamine stress echo on Monday to further evaluation.  78F daily ETOH drinker, HTN, HLD, AFib (on Xarelto), chronic venous stasis, DMT2 w peripheral neuropathy, Hx of uterine CA who was admitted to [facility] 11/21-11/30/23 for ADHFpEF with intracavitary gradient found on TTE with elevated filling pressures on L/RHC on 11/27 and hypoxia sent in by her cardiologist for SOB with minimal exertion. Admitted to cardiac telemetry for HFpEF exacerbation. Lasix IV discontinued - Cr uptrending and likely dry. TTE revealing paradoxical low-flow low-gradient severe AS and normal LVEF - will plan for Dobutamine stress echo on Monday to further evaluation.

## 2024-01-06 NOTE — PROGRESS NOTE ADULT - PROBLEM SELECTOR PLAN 3
- Reports 1-2 pints of vodka daily, last drink 1-4-23.    - Continue [facility] protocol. - Reports 1-2 pints of vodka daily, last drink 1-4-23.    - Continue [facility] protocol.

## 2024-01-06 NOTE — PROGRESS NOTE ADULT - PROBLEM SELECTOR PLAN 2
-TTE (11/22/23): LVEF > 75% w/ cavitary obliteration w/ intra-cavitary gradient (66mmHg w/ valsalva), mild LVH, dilated RV, MARISOL, mod AS/TR, mild MS/MR, pulmHTN w/ PASP 116mmHg.    - R/LHC 11/27/23 w/ non-obstructive CAD, elevated filling pressures, no severe AS or LVOT.    - PLAN: repeat TTE as above; plan for dobutamine stress test asa tawnya.

## 2024-01-06 NOTE — PROGRESS NOTE ADULT - PROBLEM SELECTOR PLAN 1
- SOB w /minimal exertion x2days; BNP 5822.   - DIURETIC: s/p IV -- discontinued 1/6/23; likely dry and uptrending Cr.     - GDMT: Farxiga 10mg PO QD, Spironolactone 25mg PO QD.   - I/O: net neg 2L   - TTE (1/5/24): LVEF > 75%, LV mid cavitary obliteration (gradient 20mmhg), low-flow low-gradient severe AS, moderate TR, PASP 71mmHg.   - PLAN: discontinue IV diuresis.   cont IV diuresis. Repeat TTE to eval valvular disease as below).

## 2024-01-06 NOTE — CONSULT NOTE ADULT - SUBJECTIVE AND OBJECTIVE BOX
HPI:  Cardiologist: Dr. Barbour    78F daily ETOH drinker, HTN, HLD, AFib (on Xarelto), chronic venous stasis, DMT2 w peripheral neuropathy, Hx of uterine CA who was admitted to [facility] 11/21-11/30/23 for ADHFpEF and hypoxia. TTE with intracavitary gradient, LVEF >75%, mild LVH, mod AS, PASP 116 mm hg, mod TR, mild MR, mild MS, with no plan for intervention from CTSx.  L/RHC 11/27 showing non obstructive CAD, no severe AS or LVOT but elevated filling pressures. IV diuresis to biochemical dehydration for which pt was given IV hydration with Cr improvement to 1.37 at discharge. Had some ETOH withdrawal sx requiring a dose of Ativan during hospitalization. PFTs w moderate restrictive defect likely from kyphosis, with mildly reduced DLCO. Was weaned off oxygen after diuresis. Today, patient presents to [facility] sent in by her cardiologist, because of worsening shortness of breath on minimal exertion. Patient reports that she was off Lasix for a week per her doctor. She reports she is barely able to walk around her apartment without getting very short of breath. Denies dizziness, LOC, chest pain, changes in bowel habits, bleeding,     In the ED:   Vitals: T(F): 98, Max: 98 (01-04 @ 13:09) HR: 56 (56 - 58) BP: 136/68 (130/63 - 136/68) RR: 20 (20 - 20) SpO2: 95% (95% - 96%)   Labs: hsTnT: 42, BNP 5822  Interventions: Lasix 40 mg IVP x1        (04 Jan 2024 13:02)      ROS: A 10-point review of systems was otherwise negative.    PAST MEDICAL & SURGICAL HISTORY:  Atrial fibrillation      Peripheral neuropathy      HTN (hypertension)      HLD (hyperlipidemia)      HARRIS (nonalcoholic steatohepatitis)      Neuropathy      Kerr's esophagus      Uterine cancer      DM (diabetes mellitus)      History of cholecystectomy      H/O total hysterectomy      H/O shoulder surgery          SOCIAL HISTORY:  FAMILY HISTORY:  No pertinent family history in first degree relatives        ALLERGIES: 	  No Known Allergies            MEDICATIONS:  atorvastatin 40 milliGRAM(s) Oral at bedtime  cyanocobalamin 1000 MICROGram(s) Oral daily  dapagliflozin 10 milliGRAM(s) Oral every 24 hours  dextrose 5%. 1000 milliLiter(s) IV Continuous <Continuous>  dextrose 5%. 1000 milliLiter(s) IV Continuous <Continuous>  dextrose 50% Injectable 12.5 Gram(s) IV Push once  dextrose 50% Injectable 25 Gram(s) IV Push once  dextrose 50% Injectable 25 Gram(s) IV Push once  dextrose Oral Gel 15 Gram(s) Oral once PRN  furosemide   Injectable 40 milliGRAM(s) IV Push two times a day  glucagon  Injectable 1 milliGRAM(s) IntraMuscular once  insulin lispro (ADMELOG) corrective regimen sliding scale   SubCutaneous Before meals and at bedtime  LORazepam   Injectable 1 milliGRAM(s) IV Push every 1 hour PRN  metoprolol tartrate 100 milliGRAM(s) Oral two times a day  nystatin Powder 1 Application(s) Topical two times a day  oxymetazoline 0.05% Nasal Spray 1 Spray(s) Both Nostrils two times a day  pantoprazole    Tablet 40 milliGRAM(s) Oral before breakfast  rivaroxaban 20 milliGRAM(s) Oral with dinner  spironolactone 25 milliGRAM(s) Oral daily  zolpidem 5 milliGRAM(s) Oral at bedtime PRN      PHYSICAL EXAM:  T(C): 36.5 (01-06-24 @ 04:39), Max: 36.9 (01-05-24 @ 17:29)  HR: 45 (01-06-24 @ 09:05) (45 - 68)  BP: 105/47 (01-06-24 @ 09:05) (105/47 - 145/65)  RR: 18 (01-06-24 @ 09:05) (18 - 18)  SpO2: 93% (01-06-24 @ 09:05) (90% - 95%)  Wt(kg): --    GEN: Awake, comfortable. NAD.   HEENT: NCAT, PERRL, EOMI. Mucosa moist. No JVD.   RESP: CTA b/l  CV: RRR, normal s1/s2. No m/r/g.  ABD: Soft, NTND. BS+  EXT: Warm. No edema, clubbing, or cyanosis.   NEURO: AAOx3. No focal deficits.    I&O's Summary    05 Jan 2024 07:01  -  06 Jan 2024 07:00  --------------------------------------------------------  IN: 750 mL / OUT: 1400 mL / NET: -650 mL    06 Jan 2024 07:01  -  06 Jan 2024 10:22  --------------------------------------------------------  IN: 180 mL / OUT: 0 mL / NET: 180 mL        	  LABS:	 	    CARDIAC MARKERS:                                  11.2   8.35  )-----------( 168      ( 06 Jan 2024 05:30 )             36.1     01-06    142  |  100  |  32<H>  ----------------------------<  129<H>  3.6   |  32<H>  |  1.77<H>    Ca    9.4      06 Jan 2024 05:30  Mg     1.8     01-06    TPro  6.6  /  Alb  4.0  /  TBili  1.6<H>  /  DBili  x   /  AST  13  /  ALT  14  /  AlkPhos  86  01-05     HPI:  Cardiologist: Dr. Barbour    78F daily ETOH drinker, HTN, HLD, AFib (on Xarelto), chronic venous stasis, DMT2 w peripheral neuropathy, Hx of uterine CA who was admitted to [facility] 11/21-11/30/23 for ADHFpEF and hypoxia. TTE with intracavitary gradient, LVEF >75%, mild LVH, mod AS, PASP 116 mm hg, mod TR, mild MR, mild MS, with no plan for intervention from CTSx.  L/RHC 11/27 showing non obstructive CAD, no severe AS or LVOT but elevated filling pressures. IV diuresis to biochemical dehydration for which pt was given IV hydration with Cr improvement to 1.37 at discharge. Had some ETOH withdrawal sx requiring a dose of Ativan during hospitalization. PFTs w moderate restrictive defect likely from kyphosis, with mildly reduced DLCO. Was weaned off oxygen after diuresis. Today, patient presents to [facility] sent in by her cardiologist, because of worsening shortness of breath on minimal exertion. Patient reports that she was off Lasix for a week per her doctor. She reports she is barely able to walk around her apartment without getting very short of breath. Denies dizziness, LOC, chest pain, changes in bowel habits, bleeding,     In the ED:   Vitals: T(F): 98, Max: 98 (01-04 @ 13:09) HR: 56 (56 - 58) BP: 136/68 (130/63 - 136/68) RR: 20 (20 - 20) SpO2: 95% (95% - 96%)   Labs: hsTnT: 42, BNP 5822  Interventions: Lasix 40 mg IVP x1        (04 Jan 2024 13:02)      ROS: A 10-point review of systems was otherwise negative.    PAST MEDICAL & SURGICAL HISTORY:  Atrial fibrillation      Peripheral neuropathy      HTN (hypertension)      HLD (hyperlipidemia)      HARRIS (nonalcoholic steatohepatitis)      Neuropathy      Kerr's esophagus      Uterine cancer      DM (diabetes mellitus)      History of cholecystectomy      H/O total hysterectomy      H/O shoulder surgery          SOCIAL HISTORY:  FAMILY HISTORY:  No pertinent family history in first degree relatives        ALLERGIES: 	  No Known Allergies            MEDICATIONS:  atorvastatin 40 milliGRAM(s) Oral at bedtime  cyanocobalamin 1000 MICROGram(s) Oral daily  dapagliflozin 10 milliGRAM(s) Oral every 24 hours  dextrose 5%. 1000 milliLiter(s) IV Continuous <Continuous>  dextrose 5%. 1000 milliLiter(s) IV Continuous <Continuous>  dextrose 50% Injectable 12.5 Gram(s) IV Push once  dextrose 50% Injectable 25 Gram(s) IV Push once  dextrose 50% Injectable 25 Gram(s) IV Push once  dextrose Oral Gel 15 Gram(s) Oral once PRN  furosemide   Injectable 40 milliGRAM(s) IV Push two times a day  glucagon  Injectable 1 milliGRAM(s) IntraMuscular once  insulin lispro (ADMELOG) corrective regimen sliding scale   SubCutaneous Before meals and at bedtime  LORazepam   Injectable 1 milliGRAM(s) IV Push every 1 hour PRN  metoprolol tartrate 100 milliGRAM(s) Oral two times a day  nystatin Powder 1 Application(s) Topical two times a day  oxymetazoline 0.05% Nasal Spray 1 Spray(s) Both Nostrils two times a day  pantoprazole    Tablet 40 milliGRAM(s) Oral before breakfast  rivaroxaban 20 milliGRAM(s) Oral with dinner  spironolactone 25 milliGRAM(s) Oral daily  zolpidem 5 milliGRAM(s) Oral at bedtime PRN      PHYSICAL EXAM:  T(C): 36.5 (01-06-24 @ 04:39), Max: 36.9 (01-05-24 @ 17:29)  HR: 45 (01-06-24 @ 09:05) (45 - 68)  BP: 105/47 (01-06-24 @ 09:05) (105/47 - 145/65)  RR: 18 (01-06-24 @ 09:05) (18 - 18)  SpO2: 93% (01-06-24 @ 09:05) (90% - 95%)  Wt(kg): --    GEN: Awake, comfortable. NAD.   HEENT: NCAT, PERRL, EOMI. Mucosa moist. No JVD.   RESP: CTA b/l  CV: RRR, normal s1/s2. No m/r/g.  ABD: Soft, NTND. BS+  EXT: Warm. No edema, clubbing, or cyanosis.   NEURO: AAOx3. No focal deficits.    I&O's Summary    05 Jan 2024 07:01  -  06 Jan 2024 07:00  --------------------------------------------------------  IN: 750 mL / OUT: 1400 mL / NET: -650 mL    06 Jan 2024 07:01  -  06 Jan 2024 10:22  --------------------------------------------------------  IN: 180 mL / OUT: 0 mL / NET: 180 mL        	  LABS:	 	    CARDIAC MARKERS:                                  11.2   8.35  )-----------( 168      ( 06 Jan 2024 05:30 )             36.1     01-06    142  |  100  |  32<H>  ----------------------------<  129<H>  3.6   |  32<H>  |  1.77<H>    Ca    9.4      06 Jan 2024 05:30  Mg     1.8     01-06    TPro  6.6  /  Alb  4.0  /  TBili  1.6<H>  /  DBili  x   /  AST  13  /  ALT  14  /  AlkPhos  86  01-05

## 2024-01-06 NOTE — PROGRESS NOTE ADULT - SUBJECTIVE AND OBJECTIVE BOX
Interventional Cardiology PA Adult Progress Note    Subjective Assessment:  	  MEDICATIONS:  metoprolol tartrate 100 milliGRAM(s) Oral two times a day  spironolactone 25 milliGRAM(s) Oral daily  LORazepam   Injectable 1 milliGRAM(s) IV Push every 1 hour PRN  zolpidem 5 milliGRAM(s) Oral at bedtime PRN  pantoprazole    Tablet 40 milliGRAM(s) Oral before breakfast  atorvastatin 40 milliGRAM(s) Oral at bedtime  dapagliflozin 10 milliGRAM(s) Oral every 24 hours  dextrose 50% Injectable 12.5 Gram(s) IV Push once  dextrose 50% Injectable 25 Gram(s) IV Push once  dextrose 50% Injectable 25 Gram(s) IV Push once  dextrose Oral Gel 15 Gram(s) Oral once PRN  glucagon  Injectable 1 milliGRAM(s) IntraMuscular once  insulin lispro (ADMELOG) corrective regimen sliding scale   SubCutaneous Before meals and at bedtime  cyanocobalamin 1000 MICROGram(s) Oral daily  dextrose 5%. 1000 milliLiter(s) IV Continuous <Continuous>  dextrose 5%. 1000 milliLiter(s) IV Continuous <Continuous>  nystatin Powder 1 Application(s) Topical two times a day  oxymetazoline 0.05% Nasal Spray 1 Spray(s) Both Nostrils two times a day  rivaroxaban 20 milliGRAM(s) Oral with dinner      PHYSICAL EXAM:  TELEMETRY:  T(C): 36.7 (01-06-24 @ 09:05), Max: 36.9 (01-05-24 @ 17:29)  HR: 71 (01-06-24 @ 13:17) (45 - 71)  BP: 101/47 (01-06-24 @ 13:17) (101/47 - 145/65)  RR: 18 (01-06-24 @ 13:17) (18 - 18)  SpO2: 92% (01-06-24 @ 13:17) (90% - 94%)  Wt(kg): --  I&O's Summary    05 Jan 2024 07:01  -  06 Jan 2024 07:00  --------------------------------------------------------  IN: 750 mL / OUT: 1400 mL / NET: -650 mL    06 Jan 2024 07:01  -  06 Jan 2024 14:25  --------------------------------------------------------  IN: 380 mL / OUT: 0 mL / NET: 380 mL                                        Appearance: Normal	  HEENT:   Normal oral mucosa, PERRL, EOI	  Neck: Supple, - JVD; Carotid Bruit   Cardiovascular: Normal S1 S2, No JVD, No murmurs,   Respiratory: Lungs clear to auscultation / No Rales, Rhonchi, Wheezing	  Gastrointestinal:  Soft, Non-tender, + BS	  Skin: No rashes, No ecchymoses, No cyanosis  Extremities: Normal range of motion, No clubbing, cyanosis or edema  Vascular: Peripheral pulses palpable 2+ bilaterally  Neurologic: Non-focal  Psychiatry: A & O x 3, Mood & affect appropriate      LABS:	 	  CARDIAC MARKERS:                        11.2   8.35  )-----------( 168      ( 06 Jan 2024 05:30 )             36.1     142  |  100  |  32<H>  ----------------------------<  129<H>  3.6   |  32<H>  |  1.77<H>    Ca    9.4      06 Jan 2024 05:30    Mg     1.8     01-06    TPro  6.6  /  Alb  4.0  /  TBili  1.6<H>  /  DBili  x   /  AST  13  /  ALT  14  /  AlkPhos  86  01-05

## 2024-01-06 NOTE — CONSULT NOTE ADULT - ASSESSMENT
78F daily ETOH drinker, HTN, HLD, AFib (on Xarelto), chronic venous stasis, DMT2 w peripheral neuropathy, Hx of uterine CA who was admitted to [facility] 11/21-11/30/23 for ADHFpEF with intracavitary gradient found on TTE with elevated filling pressures on L/RHC on 11/27 and hypoxia sent in by her cardiologist for SOB with minimal exertion. Admitted to cardiac telemetry for HFpEF exacerbation, repeat TTE now showing low flow low gradient severe AS and hyperdynamic EF with intracavitary gradient.     # HFpEF  Management per Cardiology team   Currently on lasix IV BID, spironolactone and Farxiga   Strict I/O's   Daily weights    #Aortic stenosis- low gradient in the setting of low SVi   Pending dobutamine stress echo on Monday     #TED  Cr continues to worsen despite diuretics  IVC on TTE normal with > 50% collapse, she could be getting dry  Please obtain UA and urine lytes    # Alcohol use disorder  1-2 pints of vodka daily   CIWA protocol     #Afib  c/w Xarelto and Toprol     #HTN  - c/w Toprol and haroon as above     Dispo: TBD     78F daily ETOH drinker, HTN, HLD, AFib (on Xarelto), chronic venous stasis, DMT2 w peripheral neuropathy, Hx of uterine CA who was admitted to [facility] 11/21-11/30/23 for ADHFpEF with intracavitary gradient found on TTE with elevated filling pressures on L/RHC on 11/27 and hypoxia sent in by her cardiologist for SOB with minimal exertion. Admitted to cardiac telemetry for HFpEF exacerbation, repeat TTE now showing low flow low gradient severe AS and hyperdynamic EF with intracavitary gradient.     # HFpEF  Management per Cardiology team   Currently on lasix IV BID, spironolactone and Farxiga   Strict I/O's   Daily weights    #Aortic stenosis- low gradient in the setting of low SVi   Pending dobutamine stress echo on Monday     #TED  Cr continues to worsen despite diuretics  IVC on TTE normal with > 50% collapse, she could be getting dry  Please obtain UA and urine lytes    # Alcohol use disorder  1-2 pints of vodka daily   CIWA protocol     #Afib  c/w Xarelto and Toprol     #HTN  - c/w Toprol and haroon as above     Dispo: TBD

## 2024-01-07 LAB
ANION GAP SERPL CALC-SCNC: 11 MMOL/L — SIGNIFICANT CHANGE UP (ref 5–17)
ANION GAP SERPL CALC-SCNC: 11 MMOL/L — SIGNIFICANT CHANGE UP (ref 5–17)
APPEARANCE UR: CLEAR — SIGNIFICANT CHANGE UP
APPEARANCE UR: CLEAR — SIGNIFICANT CHANGE UP
BILIRUB UR-MCNC: NEGATIVE — SIGNIFICANT CHANGE UP
BILIRUB UR-MCNC: NEGATIVE — SIGNIFICANT CHANGE UP
BUN SERPL-MCNC: 35 MG/DL — HIGH (ref 7–23)
BUN SERPL-MCNC: 35 MG/DL — HIGH (ref 7–23)
CALCIUM SERPL-MCNC: 9.9 MG/DL — SIGNIFICANT CHANGE UP (ref 8.4–10.5)
CALCIUM SERPL-MCNC: 9.9 MG/DL — SIGNIFICANT CHANGE UP (ref 8.4–10.5)
CHLORIDE SERPL-SCNC: 99 MMOL/L — SIGNIFICANT CHANGE UP (ref 96–108)
CHLORIDE SERPL-SCNC: 99 MMOL/L — SIGNIFICANT CHANGE UP (ref 96–108)
CO2 SERPL-SCNC: 29 MMOL/L — SIGNIFICANT CHANGE UP (ref 22–31)
CO2 SERPL-SCNC: 29 MMOL/L — SIGNIFICANT CHANGE UP (ref 22–31)
COLOR SPEC: YELLOW — SIGNIFICANT CHANGE UP
COLOR SPEC: YELLOW — SIGNIFICANT CHANGE UP
CREAT ?TM UR-MCNC: 152 MG/DL — SIGNIFICANT CHANGE UP
CREAT ?TM UR-MCNC: 152 MG/DL — SIGNIFICANT CHANGE UP
CREAT SERPL-MCNC: 1.48 MG/DL — HIGH (ref 0.5–1.3)
CREAT SERPL-MCNC: 1.48 MG/DL — HIGH (ref 0.5–1.3)
DIFF PNL FLD: NEGATIVE — SIGNIFICANT CHANGE UP
DIFF PNL FLD: NEGATIVE — SIGNIFICANT CHANGE UP
EGFR: 36 ML/MIN/1.73M2 — LOW
EGFR: 36 ML/MIN/1.73M2 — LOW
GLUCOSE BLDC GLUCOMTR-MCNC: 125 MG/DL — HIGH (ref 70–99)
GLUCOSE BLDC GLUCOMTR-MCNC: 125 MG/DL — HIGH (ref 70–99)
GLUCOSE BLDC GLUCOMTR-MCNC: 130 MG/DL — HIGH (ref 70–99)
GLUCOSE BLDC GLUCOMTR-MCNC: 224 MG/DL — HIGH (ref 70–99)
GLUCOSE BLDC GLUCOMTR-MCNC: 224 MG/DL — HIGH (ref 70–99)
GLUCOSE SERPL-MCNC: 138 MG/DL — HIGH (ref 70–99)
GLUCOSE SERPL-MCNC: 138 MG/DL — HIGH (ref 70–99)
GLUCOSE UR QL: >=1000 MG/DL
GLUCOSE UR QL: >=1000 MG/DL
HCT VFR BLD CALC: 37.7 % — SIGNIFICANT CHANGE UP (ref 34.5–45)
HCT VFR BLD CALC: 37.7 % — SIGNIFICANT CHANGE UP (ref 34.5–45)
HGB BLD-MCNC: 11.8 G/DL — SIGNIFICANT CHANGE UP (ref 11.5–15.5)
HGB BLD-MCNC: 11.8 G/DL — SIGNIFICANT CHANGE UP (ref 11.5–15.5)
KETONES UR-MCNC: ABNORMAL MG/DL
KETONES UR-MCNC: ABNORMAL MG/DL
LEUKOCYTE ESTERASE UR-ACNC: NEGATIVE — SIGNIFICANT CHANGE UP
LEUKOCYTE ESTERASE UR-ACNC: NEGATIVE — SIGNIFICANT CHANGE UP
MAGNESIUM SERPL-MCNC: 1.9 MG/DL — SIGNIFICANT CHANGE UP (ref 1.6–2.6)
MAGNESIUM SERPL-MCNC: 1.9 MG/DL — SIGNIFICANT CHANGE UP (ref 1.6–2.6)
MCHC RBC-ENTMCNC: 30.5 PG — SIGNIFICANT CHANGE UP (ref 27–34)
MCHC RBC-ENTMCNC: 30.5 PG — SIGNIFICANT CHANGE UP (ref 27–34)
MCHC RBC-ENTMCNC: 31.3 GM/DL — LOW (ref 32–36)
MCHC RBC-ENTMCNC: 31.3 GM/DL — LOW (ref 32–36)
MCV RBC AUTO: 97.4 FL — SIGNIFICANT CHANGE UP (ref 80–100)
MCV RBC AUTO: 97.4 FL — SIGNIFICANT CHANGE UP (ref 80–100)
NITRITE UR-MCNC: NEGATIVE — SIGNIFICANT CHANGE UP
NITRITE UR-MCNC: NEGATIVE — SIGNIFICANT CHANGE UP
NRBC # BLD: 0 /100 WBCS — SIGNIFICANT CHANGE UP (ref 0–0)
NRBC # BLD: 0 /100 WBCS — SIGNIFICANT CHANGE UP (ref 0–0)
OSMOLALITY UR: 714 MOSM/KG — SIGNIFICANT CHANGE UP (ref 300–900)
OSMOLALITY UR: 714 MOSM/KG — SIGNIFICANT CHANGE UP (ref 300–900)
PH UR: 5.5 — SIGNIFICANT CHANGE UP (ref 5–8)
PH UR: 5.5 — SIGNIFICANT CHANGE UP (ref 5–8)
PLATELET # BLD AUTO: 174 K/UL — SIGNIFICANT CHANGE UP (ref 150–400)
PLATELET # BLD AUTO: 174 K/UL — SIGNIFICANT CHANGE UP (ref 150–400)
POTASSIUM SERPL-MCNC: 3.4 MMOL/L — LOW (ref 3.5–5.3)
POTASSIUM SERPL-MCNC: 3.4 MMOL/L — LOW (ref 3.5–5.3)
POTASSIUM SERPL-SCNC: 3.4 MMOL/L — LOW (ref 3.5–5.3)
POTASSIUM SERPL-SCNC: 3.4 MMOL/L — LOW (ref 3.5–5.3)
POTASSIUM UR-SCNC: 44 MMOL/L — SIGNIFICANT CHANGE UP
POTASSIUM UR-SCNC: 44 MMOL/L — SIGNIFICANT CHANGE UP
PROT ?TM UR-MCNC: 15 MG/DL — HIGH (ref 0–12)
PROT ?TM UR-MCNC: 15 MG/DL — HIGH (ref 0–12)
PROT UR-MCNC: SIGNIFICANT CHANGE UP MG/DL
PROT UR-MCNC: SIGNIFICANT CHANGE UP MG/DL
PROT/CREAT UR-RTO: 0.1 RATIO — SIGNIFICANT CHANGE UP (ref 0–0.2)
PROT/CREAT UR-RTO: 0.1 RATIO — SIGNIFICANT CHANGE UP (ref 0–0.2)
RBC # BLD: 3.87 M/UL — SIGNIFICANT CHANGE UP (ref 3.8–5.2)
RBC # BLD: 3.87 M/UL — SIGNIFICANT CHANGE UP (ref 3.8–5.2)
RBC # FLD: 19.4 % — HIGH (ref 10.3–14.5)
RBC # FLD: 19.4 % — HIGH (ref 10.3–14.5)
SODIUM SERPL-SCNC: 139 MMOL/L — SIGNIFICANT CHANGE UP (ref 135–145)
SODIUM SERPL-SCNC: 139 MMOL/L — SIGNIFICANT CHANGE UP (ref 135–145)
SODIUM UR-SCNC: 39 MMOL/L — SIGNIFICANT CHANGE UP
SODIUM UR-SCNC: 39 MMOL/L — SIGNIFICANT CHANGE UP
SP GR SPEC: 1.03 — SIGNIFICANT CHANGE UP (ref 1–1.03)
SP GR SPEC: 1.03 — SIGNIFICANT CHANGE UP (ref 1–1.03)
UROBILINOGEN FLD QL: 1 MG/DL — SIGNIFICANT CHANGE UP (ref 0.2–1)
UROBILINOGEN FLD QL: 1 MG/DL — SIGNIFICANT CHANGE UP (ref 0.2–1)
UUN UR-MCNC: 1111 MG/DL — SIGNIFICANT CHANGE UP
UUN UR-MCNC: 1111 MG/DL — SIGNIFICANT CHANGE UP
WBC # BLD: 7.2 K/UL — SIGNIFICANT CHANGE UP (ref 3.8–10.5)
WBC # BLD: 7.2 K/UL — SIGNIFICANT CHANGE UP (ref 3.8–10.5)
WBC # FLD AUTO: 7.2 K/UL — SIGNIFICANT CHANGE UP (ref 3.8–10.5)
WBC # FLD AUTO: 7.2 K/UL — SIGNIFICANT CHANGE UP (ref 3.8–10.5)

## 2024-01-07 PROCEDURE — 99233 SBSQ HOSP IP/OBS HIGH 50: CPT

## 2024-01-07 RX ORDER — METOPROLOL TARTRATE 50 MG
50 TABLET ORAL EVERY 8 HOURS
Refills: 0 | Status: DISCONTINUED | OUTPATIENT
Start: 2024-01-07 | End: 2024-01-07

## 2024-01-07 RX ORDER — POTASSIUM CHLORIDE 20 MEQ
40 PACKET (EA) ORAL EVERY 4 HOURS
Refills: 0 | Status: COMPLETED | OUTPATIENT
Start: 2024-01-07 | End: 2024-01-07

## 2024-01-07 RX ORDER — FUROSEMIDE 40 MG
40 TABLET ORAL DAILY
Refills: 0 | Status: DISCONTINUED | OUTPATIENT
Start: 2024-01-07 | End: 2024-01-08

## 2024-01-07 RX ORDER — METOPROLOL TARTRATE 50 MG
100 TABLET ORAL EVERY 12 HOURS
Refills: 0 | Status: DISCONTINUED | OUTPATIENT
Start: 2024-01-07 | End: 2024-01-08

## 2024-01-07 RX ADMIN — NYSTATIN CREAM 1 APPLICATION(S): 100000 CREAM TOPICAL at 06:14

## 2024-01-07 RX ADMIN — SPIRONOLACTONE 25 MILLIGRAM(S): 25 TABLET, FILM COATED ORAL at 06:13

## 2024-01-07 RX ADMIN — Medication 40 MILLIEQUIVALENT(S): at 14:21

## 2024-01-07 RX ADMIN — PREGABALIN 1000 MICROGRAM(S): 225 CAPSULE ORAL at 12:02

## 2024-01-07 RX ADMIN — OXYMETAZOLINE HYDROCHLORIDE 1 SPRAY(S): 0.5 SPRAY NASAL at 17:32

## 2024-01-07 RX ADMIN — Medication 4: at 21:55

## 2024-01-07 RX ADMIN — ZOLPIDEM TARTRATE 5 MILLIGRAM(S): 10 TABLET ORAL at 22:11

## 2024-01-07 RX ADMIN — Medication 40 MILLIGRAM(S): at 14:25

## 2024-01-07 RX ADMIN — OXYMETAZOLINE HYDROCHLORIDE 1 SPRAY(S): 0.5 SPRAY NASAL at 06:14

## 2024-01-07 RX ADMIN — PANTOPRAZOLE SODIUM 40 MILLIGRAM(S): 20 TABLET, DELAYED RELEASE ORAL at 06:14

## 2024-01-07 RX ADMIN — NYSTATIN CREAM 1 APPLICATION(S): 100000 CREAM TOPICAL at 17:32

## 2024-01-07 RX ADMIN — RIVAROXABAN 20 MILLIGRAM(S): KIT at 17:31

## 2024-01-07 RX ADMIN — Medication 40 MILLIEQUIVALENT(S): at 09:48

## 2024-01-07 RX ADMIN — Medication 100 MILLIGRAM(S): at 17:45

## 2024-01-07 RX ADMIN — ATORVASTATIN CALCIUM 40 MILLIGRAM(S): 80 TABLET, FILM COATED ORAL at 21:54

## 2024-01-07 RX ADMIN — DAPAGLIFLOZIN 10 MILLIGRAM(S): 10 TABLET, FILM COATED ORAL at 12:02

## 2024-01-07 NOTE — PROGRESS NOTE ADULT - SUBJECTIVE AND OBJECTIVE BOX
Cardiology PA Adult Progress Note    Subjective Assessment:   	  MEDICATIONS:  metoprolol tartrate 100 milliGRAM(s) Oral two times a day  spironolactone 25 milliGRAM(s) Oral daily  LORazepam   Injectable 1 milliGRAM(s) IV Push every 1 hour PRN  zolpidem 5 milliGRAM(s) Oral at bedtime PRN  pantoprazole    Tablet 40 milliGRAM(s) Oral before breakfast  atorvastatin 40 milliGRAM(s) Oral at bedtime  dapagliflozin 10 milliGRAM(s) Oral every 24 hours  dextrose 50% Injectable 12.5 Gram(s) IV Push once  dextrose 50% Injectable 25 Gram(s) IV Push once  dextrose 50% Injectable 25 Gram(s) IV Push once  dextrose Oral Gel 15 Gram(s) Oral once PRN  glucagon  Injectable 1 milliGRAM(s) IntraMuscular once  insulin lispro (ADMELOG) corrective regimen sliding scale   SubCutaneous Before meals and at bedtime  cyanocobalamin 1000 MICROGram(s) Oral daily  dextrose 5%. 1000 milliLiter(s) IV Continuous <Continuous>  dextrose 5%. 1000 milliLiter(s) IV Continuous <Continuous>  nystatin Powder 1 Application(s) Topical two times a day  oxymetazoline 0.05% Nasal Spray 1 Spray(s) Both Nostrils two times a day  rivaroxaban 20 milliGRAM(s) Oral with dinner      PHYSICAL EXAM:  TELEMETRY:  T(C): 36.4 (01-07-24 @ 05:57), Max: 36.8 (01-06-24 @ 17:16)  HR: 49 (01-07-24 @ 05:57) (45 - 71)  BP: 105/52 (01-07-24 @ 05:57) (93/47 - 111/52)  RR: 16 (01-07-24 @ 05:57) (16 - 18)  SpO2: 96% (01-07-24 @ 05:57) (91% - 96%)  Wt(kg): --  I&O's Summary    06 Jan 2024 07:01  -  07 Jan 2024 07:00  --------------------------------------------------------  IN: 740 mL / OUT: 600 mL / NET: 140 mL      Appearance: Normal	  HEENT:   Normal oral mucosa, PERRL, EOI	  Neck: Supple, - JVD; Carotid Bruit   Cardiovascular: Normal S1 S2, No JVD, No murmurs,   Respiratory: Lungs clear to auscultation / No Rales, Rhonchi, Wheezing	  Gastrointestinal:  Soft, Non-tender, + BS	  Skin: No rashes, No ecchymoses, No cyanosis  Extremities: Normal range of motion, No clubbing, cyanosis or edema  Vascular: Peripheral pulses palpable 2+ bilaterally  Neurologic: Non-focal  Psychiatry: A & O x 3, Mood & affect appropriate	      LABS:	 	  CARDIAC MARKERS:                        11.8   7.20  )-----------( 174      ( 07 Jan 2024 06:35 )             37.7     139  |  99  |  35<H>  ----------------------------<  138<H>  3.4<L>   |  29  |  1.48<H>    Ca    9.9      07 Jan 2024 06:35    Mg     1.9     01-07     Cardiology PA Adult Progress Note    Subjective Assessment: Patient reports frustration with not being able to sleep. Still SOB with ambulation. Denies SOB at rest, CP, dizziness.   	  MEDICATIONS:  metoprolol tartrate 100 milliGRAM(s) Oral two times a day  spironolactone 25 milliGRAM(s) Oral daily  LORazepam   Injectable 1 milliGRAM(s) IV Push every 1 hour PRN  zolpidem 5 milliGRAM(s) Oral at bedtime PRN  pantoprazole    Tablet 40 milliGRAM(s) Oral before breakfast  atorvastatin 40 milliGRAM(s) Oral at bedtime  dapagliflozin 10 milliGRAM(s) Oral every 24 hours  dextrose 50% Injectable 12.5 Gram(s) IV Push once  dextrose 50% Injectable 25 Gram(s) IV Push once  dextrose 50% Injectable 25 Gram(s) IV Push once  dextrose Oral Gel 15 Gram(s) Oral once PRN  glucagon  Injectable 1 milliGRAM(s) IntraMuscular once  insulin lispro (ADMELOG) corrective regimen sliding scale   SubCutaneous Before meals and at bedtime  cyanocobalamin 1000 MICROGram(s) Oral daily  dextrose 5%. 1000 milliLiter(s) IV Continuous <Continuous>  dextrose 5%. 1000 milliLiter(s) IV Continuous <Continuous>  nystatin Powder 1 Application(s) Topical two times a day  oxymetazoline 0.05% Nasal Spray 1 Spray(s) Both Nostrils two times a day  rivaroxaban 20 milliGRAM(s) Oral with dinner      PHYSICAL EXAM:  TELEMETRY:  T(C): 36.4 (01-07-24 @ 05:57), Max: 36.8 (01-06-24 @ 17:16)  HR: 49 (01-07-24 @ 05:57) (45 - 71)  BP: 105/52 (01-07-24 @ 05:57) (93/47 - 111/52)  RR: 16 (01-07-24 @ 05:57) (16 - 18)  SpO2: 96% (01-07-24 @ 05:57) (91% - 96%)  Wt(kg): --  I&O's Summary    06 Jan 2024 07:01  -  07 Jan 2024 07:00  --------------------------------------------------------  IN: 740 mL / OUT: 600 mL / NET: 140 mL      Appearance: Normal	  HEENT:   Normal oral mucosa, PERRL, EOI	  Neck: Supple, - JVD; Carotid Bruit   Cardiovascular: Normal S1 S2, No JVD,  Respiratory: Lungs clear to auscultation / No Rales, Rhonchi, Wheezing	  Gastrointestinal:  Soft, Non-tender, + BS	  Skin: No rashes, No ecchymoses, No cyanosis  Extremities: Normal range of motion, No clubbing, cyanosis or edema  Vascular: Peripheral pulses palpable 2+ bilaterally  Neurologic: Non-focal  Psychiatry: A & O x 3, Mood & affect appropriate	      LABS:	 	  CARDIAC MARKERS:                        11.8   7.20  )-----------( 174      ( 07 Jan 2024 06:35 )             37.7     139  |  99  |  35<H>  ----------------------------<  138<H>  3.4<L>   |  29  |  1.48<H>    Ca    9.9      07 Jan 2024 06:35    Mg     1.9     01-07

## 2024-01-07 NOTE — PROGRESS NOTE ADULT - PROBLEM SELECTOR PLAN 2
- TTE (11/22/23): LVEF > 75% w/ cavitary obliteration w/ intra-cavitary gradient (66mmHg w/ valsalva), mild LVH, dilated RV, MARISOL, mod AS/TR, mild MS/MR, pulmHTN w/ PASP 116mmHg.    - R/LHC 11/27/23 w/ non-obstructive CAD, elevated filling pressures, no severe AS or LVOT.    - PLAN: repeat TTE as above; plan for dobutamine stress echo - TTE (11/22/23): LVEF > 75% w/ cavitary obliteration w/ intra-cavitary gradient (66mmHg w/ valsalva), mild LVH, dilated RV, MARISOL, mod AS/TR, mild MS/MR, pulmHTN w/ PASP 116mmHg.    - R/C 11/27/23 w/ non-obstructive CAD, elevated filling pressures, no severe AS or LVOT.    - PLAN: repeat TTE as above; plan for dobutamine stress echo. - TTE (11/22/23): LVEF > 75% w/ cavitary obliteration w/ intra-cavitary gradient (66mmHg w/ valsalva), mild LVH, dilated RV, MARISOL, mod AS/TR, mild MS/MR, pulmHTN w/ PASP 116mmHg.    - R/C 11/27/23 w/ non-obstructive CAD, elevated filling pressures, no severe AS or LVOT.    - PLAN: repeat TTE as above; plan for structural heart CT scan to more accurately characterize AS.

## 2024-01-07 NOTE — PROGRESS NOTE ADULT - PROBLEM SELECTOR PLAN 6
- -140s.    - CONT: Toprol 100mg PO BID - -140s.    - CONT: Lopressor 50mg PO TID (decrease from 100mg BID in preparation for dobutamine stress echo). - -140s.    - CONT: Lopressor 100mg PO BID.

## 2024-01-07 NOTE — PROGRESS NOTE ADULT - ASSESSMENT
78F daily ETOH drinker, HTN, HLD, AFib (on Xarelto), chronic venous stasis, DMT2 w peripheral neuropathy, Hx of uterine CA who was admitted to [facility] 11/21-11/30/23 for ADHFpEF with intracavitary gradient found on TTE with elevated filling pressures on L/RHC on 11/27 and hypoxia sent in by her cardiologist for SOB with minimal exertion. Admitted to cardiac telemetry for HFpEF exacerbation. Lasix IV discontinued - Cr uptrending and likely dry. TTE revealing paradoxical low-flow low-gradient severe AS and normal LVEF - will plan for Dobutamine stress echo on Monday to further evaluation.  78F daily ETOH drinker, HTN, HLD, AFib (on Xarelto), chronic venous stasis, DMT2 w peripheral neuropathy, Hx of uterine CA who was admitted to [facility] 11/21-11/30/23 for ADHFpEF with intracavitary gradient found on TTE with elevated filling pressures on L/RHC on 11/27 and hypoxia sent in by her cardiologist for SOB with minimal exertion. Admitted to cardiac telemetry for HFpEF exacerbation. Lasix IV discontinued - Cr uptrending and likely dry. TTE revealing paradoxical low-flow low-gradient severe AS and normal LVEF - will plan for Dobutamine stress echo on Monday to further evaluation.  78F daily ETOH drinker, HTN, HLD, AFib (on Xarelto), chronic venous stasis, DMT2 w peripheral neuropathy, Hx of uterine CA who was admitted to [facility] 11/21-11/30/23 for ADHFpEF with intracavitary gradient found on TTE with elevated filling pressures on L/RHC on 11/27 and hypoxia sent in by her cardiologist for SOB with minimal exertion. Admitted to cardiac telemetry for HFpEF exacerbation. Lasix IV discontinued - Cr uptrending and likely dry. TTE revealing paradoxical low-flow low-gradient severe AS and normal LVEF - will plan cardiac structural CT for more accurate eval of aortic stenosis severity. 78F daily ETOH drinker, HTN, HLD, AFib (on Xarelto), chronic venous stasis, DMT2 w peripheral neuropathy, Hx of uterine CA who was admitted to [facility] 11/21-11/30/23 for ADHFpEF with intracavitary gradient found on TTE with elevated filling pressures on L/RHC on 11/27 and hypoxia sent in by her cardiologist for SOB with minimal exertion. Admitted to cardiac telemetry for HFpEF exacerbation. Lasix IV discontinued - Cr uptrending and likely dry. TTE revealing paradoxical low-flow low-gradient severe AS and normal LVEF - will plan cardiac structural CT for more accurate eval of aortic stenosis severity.

## 2024-01-07 NOTE — PROGRESS NOTE ADULT - SUBJECTIVE AND OBJECTIVE BOX
INTERVAL EVENTS: No acute events     PAST MEDICAL & SURGICAL HISTORY:  Atrial fibrillation    Peripheral neuropathy    HTN (hypertension)    HLD (hyperlipidemia)    HARRIS (nonalcoholic steatohepatitis)    Neuropathy    Kerr's esophagus    Uterine cancer    DM (diabetes mellitus)    History of cholecystectomy    H/O total hysterectomy    H/O shoulder surgery        MEDICATIONS  (STANDING):  atorvastatin 40 milliGRAM(s) Oral at bedtime  cyanocobalamin 1000 MICROGram(s) Oral daily  dapagliflozin 10 milliGRAM(s) Oral every 24 hours  dextrose 5%. 1000 milliLiter(s) (100 mL/Hr) IV Continuous <Continuous>  dextrose 5%. 1000 milliLiter(s) (50 mL/Hr) IV Continuous <Continuous>  dextrose 50% Injectable 12.5 Gram(s) IV Push once  dextrose 50% Injectable 25 Gram(s) IV Push once  dextrose 50% Injectable 25 Gram(s) IV Push once  glucagon  Injectable 1 milliGRAM(s) IntraMuscular once  insulin lispro (ADMELOG) corrective regimen sliding scale   SubCutaneous Before meals and at bedtime  metoprolol tartrate 50 milliGRAM(s) Oral every 8 hours  nystatin Powder 1 Application(s) Topical two times a day  oxymetazoline 0.05% Nasal Spray 1 Spray(s) Both Nostrils two times a day  pantoprazole    Tablet 40 milliGRAM(s) Oral before breakfast  potassium chloride    Tablet ER 40 milliEquivalent(s) Oral every 4 hours  rivaroxaban 20 milliGRAM(s) Oral with dinner  spironolactone 25 milliGRAM(s) Oral daily    MEDICATIONS  (PRN):  dextrose Oral Gel 15 Gram(s) Oral once PRN Blood Glucose LESS THAN 70 milliGRAM(s)/deciliter  LORazepam   Injectable 1 milliGRAM(s) IV Push every 1 hour PRN CIWA-Ar score 8 or greater  zolpidem 5 milliGRAM(s) Oral at bedtime PRN Insomnia    Vital Signs Last 24 Hrs  T(C): 36.3 (07 Jan 2024 08:25), Max: 36.8 (06 Jan 2024 17:16)  T(F): 97.3 (07 Jan 2024 08:25), Max: 98.3 (06 Jan 2024 17:16)  HR: 51 (07 Jan 2024 10:44) (49 - 71)  BP: 107/49 (07 Jan 2024 10:44) (93/47 - 111/52)  BP(mean): 70 (07 Jan 2024 10:44) (66 - 75)  RR: 17 (07 Jan 2024 10:44) (16 - 18)  SpO2: 98% (07 Jan 2024 10:44) (91% - 100%)    Parameters below as of 07 Jan 2024 10:44  Patient On (Oxygen Delivery Method): nasal cannula w/ humidification  O2 Flow (L/min): 2      PHYSICAL EXAM:  GEN: Awake, alert. NAD.   HEENT: NCAT, PERRL, EOMI. Mucosa moist. No JVD.  RESP: CTA b/l  CV: RRR. Normal S1/S2. No m/r/g.  ABD: Soft. NT/ND. BS+  EXT: Warm. chronic skin changes   LABS:                        11.8   7.20  )-----------( 174      ( 07 Jan 2024 06:35 )             37.7     01-07    139  |  99  |  35<H>  ----------------------------<  138<H>  3.4<L>   |  29  |  1.48<H>    Ca    9.9      07 Jan 2024 06:35  Mg     1.9     01-07            Urinalysis Basic - ( 07 Jan 2024 06:35 )    Color: x / Appearance: x / SG: x / pH: x  Gluc: 138 mg/dL / Ketone: x  / Bili: x / Urobili: x   Blood: x / Protein: x / Nitrite: x   Leuk Esterase: x / RBC: x / WBC x   Sq Epi: x / Non Sq Epi: x / Bacteria: x      I&O's Summary    06 Jan 2024 07:01  -  07 Jan 2024 07:00  --------------------------------------------------------  IN: 740 mL / OUT: 600 mL / NET: 140 mL    07 Jan 2024 07:01  -  07 Jan 2024 12:17  --------------------------------------------------------  IN: 180 mL / OUT: 0 mL / NET: 180 mL      RADIOLOGY & ADDITIONAL STUDIES:  TELEMETRY:  EKG:

## 2024-01-07 NOTE — PROGRESS NOTE ADULT - NS_MD_PANP_GEN_ALL_CORE
Attending and PA/NP shared services statement (NON-critical care):
details…

## 2024-01-07 NOTE — PROGRESS NOTE ADULT - PROBLEM SELECTOR PLAN 5
- Currently rate controlled AFib.    - CONT: Xarelto 20mg PO QD, Toprol 100mg PO BID. - Currently rate controlled AFib.    - CONT: Xarelto 20mg PO QD, Lopressor 50mg PO - Currently rate controlled AFib.    - CONT: Xarelto 20mg PO QD, Lopressor 100mg PO BID.

## 2024-01-07 NOTE — PROGRESS NOTE ADULT - PROBLEM SELECTOR PROBLEM 1
(HFpEF) heart failure with preserved ejection fraction

## 2024-01-07 NOTE — PROGRESS NOTE ADULT - ASSESSMENT
78F daily ETOH drinker, HTN, HLD, AFib (on Xarelto), chronic venous stasis, DMT2 w peripheral neuropathy, Hx of uterine CA who was admitted to [facility] 11/21-11/30/23 for ADHFpEF with intracavitary gradient found on TTE with elevated filling pressures on L/RHC on 11/27 and hypoxia sent in by her cardiologist for SOB with minimal exertion. Admitted to cardiac telemetry for HFpEF exacerbation, repeat TTE now showing low flow low gradient severe AS and hyperdynamic EF with intracavitary gradient.     # HFpEF  Management per Cardiology team   Currently on  spironolactone and Farxiga. Lasix stopped 1/6 due to TED   Strict I/O's   Daily weights    #Paradoxical low flow low gradient severe Aortic stenosis- low gradient in the setting of low SVi   Pending dobutamine stress echo on Monday vs CT     #TED  Cr improved after diuretics stopped   Pre-renal in etiology  Continue to monitor     # Alcohol use disorder  1-2 pints of vodka daily   No signs of withdrawal   CIWA protocol     #Afib  c/w Xarelto and Toprol     #HTN  - c/w Toprol and haroon as above     Dispo: Likely tomorrow  78F daily ETOH drinker, HTN, HLD, AFib (on Xarelto), chronic venous stasis, DMT2 w peripheral neuropathy, Hx of uterine CA who was admitted to [facility] 11/21-11/30/23 for ADHFpEF with intracavitary gradient found on TTE with elevated filling pressures on L/RHC on 11/27 and hypoxia sent in by her cardiologist for SOB with minimal exertion. Admitted to cardiac telemetry for HFpEF exacerbation, repeat TTE now showing low flow low gradient severe AS and hyperdynamic EF with intracavitary gradient.     # HFpEF  Management per Cardiology team   Currently on  spironolactone and Farxiga. Lasix stopped 1/6 due to TED   Strict I/O's   Daily weights    #Paradoxical low flow low gradient severe Aortic stenosis- low gradient in the setting of low SVi   Pending dobutamine stress echo on Monday vs CT     #TED  Cr improved after diuretics stopped   Pre-renal in etiology  Continue to monitor     # Alcohol use disorder  1-2 pints of vodka daily   No signs of withdrawal   CIWA protocol     #Afib  c/w Xarelto and Toprol     #HTN  - c/w Toprol and haroon as above     Dispo: Likely tomorrow

## 2024-01-07 NOTE — PROGRESS NOTE ADULT - PROBLEM SELECTOR PLAN 7
-B/L venous stasis ulcers; wound care consult placed     DVT ppx: Xarelto   Dispo: dobutamine stress echo tomorrow 1/8/24. -B/L venous stasis ulcers; wound care consult placed     DVT ppx: Xarelto   Dispo: structural heart CT scan 1/8/24.

## 2024-01-07 NOTE — PROGRESS NOTE ADULT - PROBLEM SELECTOR PLAN 4
- Cr 1.3 -> 1.57 -> 1.77 -> 1.44.   - Now improving off diuresis; likely in setting of diuretic.   - Continue to monitor. - Cr 1.3 -> 1.57 -> 1.77 -> 1.44.   - Now improving off diuresis; likely in setting of diuretic.   - Continue to monitor; will receive contrast load w/ CT scan - monitor renal function.

## 2024-01-07 NOTE — PROGRESS NOTE ADULT - PROBLEM SELECTOR PLAN 3
- Reports 1-2 pints of vodka daily, last drink 1-4-23.    - Continue [facility] protocol. - Reports 1-2 pints of vodka daily, last drink 1-4-23.    - Continue [facility] protocol.

## 2024-01-07 NOTE — PROGRESS NOTE ADULT - NS ATTEND AMEND GEN_ALL_CORE FT
79 yo lady PMHx of prior tobacco use disorder, non-obstructive CAD ([facility] ) paroxysmal Afib on rivaroxaban, HFpEF, DM2 c/b diabetic neuropathy, and LE lymphedema who is admitted for acute on chronic decompensated HFpEF exacerbation.    Recent admission @ [facility]   At that time, TTE w/ hyperdynamic LV w/ intra-cavitary gradient 66 mmHg w/ valsalva and elevated PASP  However, in the cath lab, no significant LVOT gradient was noted. Peak-peak aortic gradient @ rest was 5 mmHg. Mitral valve gradient 3 mmHg.  RHC did reveal RA 8, RV 65/11, PA 75/25, PCWP 20, and Loretta CO/CI 3.7/2.2 c/w HFpEF    Assessment  1. Acute on chronic decompensated HFpEF exacerbation  Euvolemic on exam now  Requiring O2, likely has undiagnosed COPD from heavy tobacco use previously  2. Paroxysmal Afib - in NSR  3. LE edema with skin changes 2/2 chronic lymphedema  4. Non-obstructive CAD  5. Paradoxical low flow low gradient severe AS w/ normal EF  6. Moderate TR    Plan  1. PO lasix 40mg QD to keep net I/O even to slightly net negative  2. HFpEF: Aldactone 25mg PO QD and SGLT2i  3. Metoprolol tartrate 100mg BID for paroxysmal Afib currently in NSR  4. Xarelto at current dose for systemic embolization prevention  5. High intensity statin for HLD + CAD  6. Will order LE arterial duplex b/l to r/o PAD   7. For low-dose dobutamine stress test for paradoxical low flow low gradient severe AS, given hyperdynamic EF, will avoid  stress echo and rather pursue CT heart aortic valve study  8. She will f/u with me as outpatient for her lymphedema    During non face-to-face time, I reviewed relevant portions of the patient’s medical record. During face-to-face time, I took a relevant history and examined the patient. I also explained differential diagnoses, relevant cardiac diagnoses, workup, and management plan, which required a high level of medical decision making. I answered all questions related to the patient's medical conditions.     Eva Schneider M.D.  CARDIOLOGY ATTENDING. 77 yo lady PMHx of prior tobacco use disorder, non-obstructive CAD ([facility] ) paroxysmal Afib on rivaroxaban, HFpEF, DM2 c/b diabetic neuropathy, and LE lymphedema who is admitted for acute on chronic decompensated HFpEF exacerbation.    Recent admission @ [facility]   At that time, TTE w/ hyperdynamic LV w/ intra-cavitary gradient 66 mmHg w/ valsalva and elevated PASP  However, in the cath lab, no significant LVOT gradient was noted. Peak-peak aortic gradient @ rest was 5 mmHg. Mitral valve gradient 3 mmHg.  RHC did reveal RA 8, RV 65/11, PA 75/25, PCWP 20, and Loretta CO/CI 3.7/2.2 c/w HFpEF    Assessment  1. Acute on chronic decompensated HFpEF exacerbation  Euvolemic on exam now  Requiring O2, likely has undiagnosed COPD from heavy tobacco use previously  2. Paroxysmal Afib - in NSR  3. LE edema with skin changes 2/2 chronic lymphedema  4. Non-obstructive CAD  5. Paradoxical low flow low gradient severe AS w/ normal EF  6. Moderate TR    Plan  1. PO lasix 40mg QD to keep net I/O even to slightly net negative  2. HFpEF: Aldactone 25mg PO QD and SGLT2i  3. Metoprolol tartrate 100mg BID for paroxysmal Afib currently in NSR  4. Xarelto at current dose for systemic embolization prevention  5. High intensity statin for HLD + CAD  6. Will order LE arterial duplex b/l to r/o PAD   7. For low-dose dobutamine stress test for paradoxical low flow low gradient severe AS, given hyperdynamic EF, will avoid  stress echo and rather pursue CT heart aortic valve study  8. She will f/u with me as outpatient for her lymphedema    During non face-to-face time, I reviewed relevant portions of the patient’s medical record. During face-to-face time, I took a relevant history and examined the patient. I also explained differential diagnoses, relevant cardiac diagnoses, workup, and management plan, which required a high level of medical decision making. I answered all questions related to the patient's medical conditions.     Eva Schneider M.D.  CARDIOLOGY ATTENDING.

## 2024-01-07 NOTE — PROGRESS NOTE ADULT - PROBLEM SELECTOR PLAN 1
- SOB w /minimal exertion x2days; BNP 5822.   - DIURETIC: s/p IV -- discontinued 1/6/23; likely dry and uptrending Cr.     - GDMT: Farxiga 10mg PO QD, Spironolactone 25mg PO QD.   - I/O: net neg 2L   - TTE (1/5/24): LVEF > 75%, LV mid cavitary obliteration (gradient 20mmhg), low-flow low-gradient severe AS, moderate TR, PASP 71mmHg.   - PLAN: discontinue IV diuresis. TTE as below. - SOB w /minimal exertion x2days; BNP 5822.   - DIURETIC: Lasix 40mg PO QD (s/p IV lasix)  - GDMT: Farxiga 10mg PO QD, Spironolactone 25mg PO QD.   - I/O: net neg 2L   - TTE (1/5/24): LVEF > 75%, LV mid cavitary obliteration (gradient 20mmhg), low-flow low-gradient severe AS, moderate TR, PASP 71mmHg.   - PLAN: discontinue IV diuresis. TTE as below.

## 2024-01-08 ENCOUNTER — TRANSCRIPTION ENCOUNTER (OUTPATIENT)
Age: 79
End: 2024-01-08

## 2024-01-08 VITALS — WEIGHT: 182.98 LBS

## 2024-01-08 LAB
ANION GAP SERPL CALC-SCNC: 8 MMOL/L — SIGNIFICANT CHANGE UP (ref 5–17)
ANION GAP SERPL CALC-SCNC: 8 MMOL/L — SIGNIFICANT CHANGE UP (ref 5–17)
BUN SERPL-MCNC: 35 MG/DL — HIGH (ref 7–23)
BUN SERPL-MCNC: 35 MG/DL — HIGH (ref 7–23)
CALCIUM SERPL-MCNC: 9.9 MG/DL — SIGNIFICANT CHANGE UP (ref 8.4–10.5)
CALCIUM SERPL-MCNC: 9.9 MG/DL — SIGNIFICANT CHANGE UP (ref 8.4–10.5)
CHLORIDE SERPL-SCNC: 102 MMOL/L — SIGNIFICANT CHANGE UP (ref 96–108)
CHLORIDE SERPL-SCNC: 102 MMOL/L — SIGNIFICANT CHANGE UP (ref 96–108)
CO2 SERPL-SCNC: 27 MMOL/L — SIGNIFICANT CHANGE UP (ref 22–31)
CO2 SERPL-SCNC: 27 MMOL/L — SIGNIFICANT CHANGE UP (ref 22–31)
CREAT SERPL-MCNC: 1.47 MG/DL — HIGH (ref 0.5–1.3)
CREAT SERPL-MCNC: 1.47 MG/DL — HIGH (ref 0.5–1.3)
EGFR: 36 ML/MIN/1.73M2 — LOW
EGFR: 36 ML/MIN/1.73M2 — LOW
GLUCOSE BLDC GLUCOMTR-MCNC: 106 MG/DL — HIGH (ref 70–99)
GLUCOSE BLDC GLUCOMTR-MCNC: 106 MG/DL — HIGH (ref 70–99)
GLUCOSE BLDC GLUCOMTR-MCNC: 120 MG/DL — HIGH (ref 70–99)
GLUCOSE BLDC GLUCOMTR-MCNC: 120 MG/DL — HIGH (ref 70–99)
GLUCOSE SERPL-MCNC: 127 MG/DL — HIGH (ref 70–99)
GLUCOSE SERPL-MCNC: 127 MG/DL — HIGH (ref 70–99)
HCT VFR BLD CALC: 38.8 % — SIGNIFICANT CHANGE UP (ref 34.5–45)
HCT VFR BLD CALC: 38.8 % — SIGNIFICANT CHANGE UP (ref 34.5–45)
HGB BLD-MCNC: 11.8 G/DL — SIGNIFICANT CHANGE UP (ref 11.5–15.5)
HGB BLD-MCNC: 11.8 G/DL — SIGNIFICANT CHANGE UP (ref 11.5–15.5)
MAGNESIUM SERPL-MCNC: 1.9 MG/DL — SIGNIFICANT CHANGE UP (ref 1.6–2.6)
MAGNESIUM SERPL-MCNC: 1.9 MG/DL — SIGNIFICANT CHANGE UP (ref 1.6–2.6)
MCHC RBC-ENTMCNC: 30.4 GM/DL — LOW (ref 32–36)
MCHC RBC-ENTMCNC: 30.4 GM/DL — LOW (ref 32–36)
MCHC RBC-ENTMCNC: 30.6 PG — SIGNIFICANT CHANGE UP (ref 27–34)
MCHC RBC-ENTMCNC: 30.6 PG — SIGNIFICANT CHANGE UP (ref 27–34)
MCV RBC AUTO: 100.5 FL — HIGH (ref 80–100)
MCV RBC AUTO: 100.5 FL — HIGH (ref 80–100)
NRBC # BLD: 0 /100 WBCS — SIGNIFICANT CHANGE UP (ref 0–0)
NRBC # BLD: 0 /100 WBCS — SIGNIFICANT CHANGE UP (ref 0–0)
PLATELET # BLD AUTO: 167 K/UL — SIGNIFICANT CHANGE UP (ref 150–400)
PLATELET # BLD AUTO: 167 K/UL — SIGNIFICANT CHANGE UP (ref 150–400)
POTASSIUM SERPL-MCNC: 4.6 MMOL/L — SIGNIFICANT CHANGE UP (ref 3.5–5.3)
POTASSIUM SERPL-MCNC: 4.6 MMOL/L — SIGNIFICANT CHANGE UP (ref 3.5–5.3)
POTASSIUM SERPL-SCNC: 4.6 MMOL/L — SIGNIFICANT CHANGE UP (ref 3.5–5.3)
POTASSIUM SERPL-SCNC: 4.6 MMOL/L — SIGNIFICANT CHANGE UP (ref 3.5–5.3)
RBC # BLD: 3.86 M/UL — SIGNIFICANT CHANGE UP (ref 3.8–5.2)
RBC # BLD: 3.86 M/UL — SIGNIFICANT CHANGE UP (ref 3.8–5.2)
RBC # FLD: 19.2 % — HIGH (ref 10.3–14.5)
RBC # FLD: 19.2 % — HIGH (ref 10.3–14.5)
SODIUM SERPL-SCNC: 137 MMOL/L — SIGNIFICANT CHANGE UP (ref 135–145)
SODIUM SERPL-SCNC: 137 MMOL/L — SIGNIFICANT CHANGE UP (ref 135–145)
WBC # BLD: 6.8 K/UL — SIGNIFICANT CHANGE UP (ref 3.8–10.5)
WBC # BLD: 6.8 K/UL — SIGNIFICANT CHANGE UP (ref 3.8–10.5)
WBC # FLD AUTO: 6.8 K/UL — SIGNIFICANT CHANGE UP (ref 3.8–10.5)
WBC # FLD AUTO: 6.8 K/UL — SIGNIFICANT CHANGE UP (ref 3.8–10.5)

## 2024-01-08 PROCEDURE — 82330 ASSAY OF CALCIUM: CPT

## 2024-01-08 PROCEDURE — 80061 LIPID PANEL: CPT

## 2024-01-08 PROCEDURE — 83935 ASSAY OF URINE OSMOLALITY: CPT

## 2024-01-08 PROCEDURE — 87637 SARSCOV2&INF A&B&RSV AMP PRB: CPT

## 2024-01-08 PROCEDURE — 83735 ASSAY OF MAGNESIUM: CPT

## 2024-01-08 PROCEDURE — 83880 ASSAY OF NATRIURETIC PEPTIDE: CPT

## 2024-01-08 PROCEDURE — 99239 HOSP IP/OBS DSCHRG MGMT >30: CPT

## 2024-01-08 PROCEDURE — 84484 ASSAY OF TROPONIN QUANT: CPT

## 2024-01-08 PROCEDURE — 36415 COLL VENOUS BLD VENIPUNCTURE: CPT

## 2024-01-08 PROCEDURE — 85730 THROMBOPLASTIN TIME PARTIAL: CPT

## 2024-01-08 PROCEDURE — 99285 EMERGENCY DEPT VISIT HI MDM: CPT

## 2024-01-08 PROCEDURE — 82570 ASSAY OF URINE CREATININE: CPT

## 2024-01-08 PROCEDURE — 84156 ASSAY OF PROTEIN URINE: CPT

## 2024-01-08 PROCEDURE — 81003 URINALYSIS AUTO W/O SCOPE: CPT

## 2024-01-08 PROCEDURE — 84295 ASSAY OF SERUM SODIUM: CPT

## 2024-01-08 PROCEDURE — 85025 COMPLETE CBC W/AUTO DIFF WBC: CPT

## 2024-01-08 PROCEDURE — 83540 ASSAY OF IRON: CPT

## 2024-01-08 PROCEDURE — 96374 THER/PROPH/DIAG INJ IV PUSH: CPT

## 2024-01-08 PROCEDURE — 83550 IRON BINDING TEST: CPT

## 2024-01-08 PROCEDURE — 80053 COMPREHEN METABOLIC PANEL: CPT

## 2024-01-08 PROCEDURE — 93306 TTE W/DOPPLER COMPLETE: CPT

## 2024-01-08 PROCEDURE — 82728 ASSAY OF FERRITIN: CPT

## 2024-01-08 PROCEDURE — 99221 1ST HOSP IP/OBS SF/LOW 40: CPT

## 2024-01-08 PROCEDURE — 84540 ASSAY OF URINE/UREA-N: CPT

## 2024-01-08 PROCEDURE — 85610 PROTHROMBIN TIME: CPT

## 2024-01-08 PROCEDURE — 85027 COMPLETE CBC AUTOMATED: CPT

## 2024-01-08 PROCEDURE — 82803 BLOOD GASES ANY COMBINATION: CPT

## 2024-01-08 PROCEDURE — 93005 ELECTROCARDIOGRAM TRACING: CPT

## 2024-01-08 PROCEDURE — 97161 PT EVAL LOW COMPLEX 20 MIN: CPT

## 2024-01-08 PROCEDURE — 80048 BASIC METABOLIC PNL TOTAL CA: CPT

## 2024-01-08 PROCEDURE — 84133 ASSAY OF URINE POTASSIUM: CPT

## 2024-01-08 PROCEDURE — 71045 X-RAY EXAM CHEST 1 VIEW: CPT

## 2024-01-08 PROCEDURE — 83036 HEMOGLOBIN GLYCOSYLATED A1C: CPT

## 2024-01-08 PROCEDURE — 84132 ASSAY OF SERUM POTASSIUM: CPT

## 2024-01-08 PROCEDURE — 80307 DRUG TEST PRSMV CHEM ANLYZR: CPT

## 2024-01-08 PROCEDURE — 93925 LOWER EXTREMITY STUDY: CPT

## 2024-01-08 PROCEDURE — 84300 ASSAY OF URINE SODIUM: CPT

## 2024-01-08 PROCEDURE — 82962 GLUCOSE BLOOD TEST: CPT

## 2024-01-08 RX ORDER — SPIRONOLACTONE 25 MG/1
1 TABLET, FILM COATED ORAL
Qty: 30 | Refills: 0
Start: 2024-01-08 | End: 2024-02-06

## 2024-01-08 RX ORDER — MAGNESIUM OXIDE 400 MG ORAL TABLET 241.3 MG
400 TABLET ORAL ONCE
Refills: 0 | Status: COMPLETED | OUTPATIENT
Start: 2024-01-08 | End: 2024-01-08

## 2024-01-08 RX ORDER — FUROSEMIDE 40 MG
1 TABLET ORAL
Qty: 30 | Refills: 0
Start: 2024-01-08 | End: 2024-02-06

## 2024-01-08 RX ADMIN — NYSTATIN CREAM 1 APPLICATION(S): 100000 CREAM TOPICAL at 05:56

## 2024-01-08 RX ADMIN — Medication 40 MILLIGRAM(S): at 05:56

## 2024-01-08 RX ADMIN — DAPAGLIFLOZIN 10 MILLIGRAM(S): 10 TABLET, FILM COATED ORAL at 12:01

## 2024-01-08 RX ADMIN — PANTOPRAZOLE SODIUM 40 MILLIGRAM(S): 20 TABLET, DELAYED RELEASE ORAL at 05:55

## 2024-01-08 RX ADMIN — MAGNESIUM OXIDE 400 MG ORAL TABLET 400 MILLIGRAM(S): 241.3 TABLET ORAL at 12:00

## 2024-01-08 RX ADMIN — SPIRONOLACTONE 25 MILLIGRAM(S): 25 TABLET, FILM COATED ORAL at 05:56

## 2024-01-08 RX ADMIN — PREGABALIN 1000 MICROGRAM(S): 225 CAPSULE ORAL at 12:00

## 2024-01-08 NOTE — CONSULT NOTE ADULT - SUBJECTIVE AND OBJECTIVE BOX
HISTORY OF PRESENT ILLNESS:  78F daily ETOH drinker, HTN, HLD, AFib (on Xarelto), chronic venous stasis, DMT2 w peripheral neuropathy, Hx of uterine CA who was admitted to [facility] 11/21-11/30/23 for ADHFpEF with intracavitary gradient found on TTE with elevated filling pressures on L/RHC on 11/27 and hypoxia sent in by her cardiologist for SOB with minimal exertion. Admitted to cardiac telemetry for HFpEF exacerbation. Lasix IV discontinued - Cr uptrending and likely dry. TTE revealing paradoxical low-flow low-gradient severe AS and normal LVEF. Structural heart team consulted for tte finding of severe AS.      PAST MEDICAL & SURGICAL HISTORY:  Atrial fibrillation      Peripheral neuropathy      HTN (hypertension)      HLD (hyperlipidemia)      HARRIS (nonalcoholic steatohepatitis)      Neuropathy      Kerr's esophagus      Uterine cancer      DM (diabetes mellitus)      History of cholecystectomy      H/O total hysterectomy      H/O shoulder surgery          MEDICATIONS  (STANDING):  atorvastatin 40 milliGRAM(s) Oral at bedtime  cyanocobalamin 1000 MICROGram(s) Oral daily  dapagliflozin 10 milliGRAM(s) Oral every 24 hours  dextrose 5%. 1000 milliLiter(s) (50 mL/Hr) IV Continuous <Continuous>  dextrose 5%. 1000 milliLiter(s) (100 mL/Hr) IV Continuous <Continuous>  dextrose 50% Injectable 12.5 Gram(s) IV Push once  dextrose 50% Injectable 25 Gram(s) IV Push once  dextrose 50% Injectable 25 Gram(s) IV Push once  furosemide    Tablet 40 milliGRAM(s) Oral daily  glucagon  Injectable 1 milliGRAM(s) IntraMuscular once  insulin lispro (ADMELOG) corrective regimen sliding scale   SubCutaneous Before meals and at bedtime  magnesium oxide 400 milliGRAM(s) Oral once  metoprolol tartrate 100 milliGRAM(s) Oral every 12 hours  nystatin Powder 1 Application(s) Topical two times a day  pantoprazole    Tablet 40 milliGRAM(s) Oral before breakfast  rivaroxaban 20 milliGRAM(s) Oral with dinner  spironolactone 25 milliGRAM(s) Oral daily    MEDICATIONS  (PRN):  dextrose Oral Gel 15 Gram(s) Oral once PRN Blood Glucose LESS THAN 70 milliGRAM(s)/deciliter  LORazepam   Injectable 1 milliGRAM(s) IV Push every 1 hour PRN CIWA-Ar score 8 or greater  zolpidem 5 milliGRAM(s) Oral at bedtime PRN Insomnia      Allergies    No Known Allergies    Intolerances        SOCIAL HISTORY:  Daily EtOH use (2 bottles of vodka per day)  Denies tobacco/illicit drug use     FAMILY HISTORY:  No pertinent family history in first degree relatives        Review of Systems:  CONSTITUTIONAL: Denies fevers / chills, sweats, fatigue, weight loss, weight gain                                       NEURO:  Denies parathesias, seizures, syncope, confusion                                                                                  EYES:  Denies blurry vision, discharge, pain, loss of vision                                                                                    ENMT:  Denies difficulty hearing, vertigo, dysphagia, epistaxis, recent dental work                                       CV:  Denies chest pain, palpitations, GREGORY, orthopnea                                                                                           RESPIRATORY:  Denies Wheezing, SOB, cough / sputum, hemoptysis                                                               GI:  Denies nausea, vomiting, diarrhea, constipation, melena                                                                          : Denies hematuria, dysuria, urgency, incontinence                                                                                          MUSKULOSKELETAL:  Denies arthritis, joint swelling, muscle weakness                                                             SKIN/BREAST:  Denies rash, itching, hair loss, masses                                                                                              PSYCH:  Denies depression, anxiety, suicidal ideation                                                                                                HEME/LYMPH:  Denies bruises easily, enlarged lymph nodes, tender lymph nodes                                          ENDOCRINE:  Denies cold intolerance, heat intolerance, polydipsia                                                                      Vital Signs Last 24 Hrs  T(C): 36.6 (08 Jan 2024 05:55), Max: 36.6 (08 Jan 2024 05:55)  T(F): 97.9 (08 Jan 2024 05:55), Max: 97.9 (08 Jan 2024 05:55)  HR: 66 (08 Jan 2024 10:16) (46 - 66)  BP: 127/58 (08 Jan 2024 10:16) (103/46 - 158/65)  BP(mean): 77 (08 Jan 2024 08:49) (67 - 94)  RR: 16 (08 Jan 2024 08:49) (16 - 19)  SpO2: 92% (08 Jan 2024 10:03) (92% - 98%)    Parameters below as of 08 Jan 2024 10:16  Patient On (Oxygen Delivery Method): room air        Physical Exam:  CONSTITUTIONAL:  OOB to chair, no acute distress.   NEURO: AAOx3, no AMS or focal deficits.                       EYES: EOMI b/l, PEERLA b/l. Exhibits appropriate visual acuity.   ENMT: Hearing in tact.  MMM.  No palpable neck masses or hoarseness.   CV: + DEAN. RRR, S1/S2.   RESPIRATORY:  No acute distress.  CTA b/l, no w/r/r.   GI: ND, NBS, non-TTP.  : No jenkins.    MUSKULOSKELETAL: No obvious malformations.  Non-TTP.  Exhibits normal ROM in all extremities.   SKIN / BREAST:  + LE hemosiderin staining No obvious lesions/abrasions noted.                                                           LABS:                        11.8   6.80  )-----------( 167      ( 08 Jan 2024 05:30 )             38.8     01-08    137  |  102  |  35<H>  ----------------------------<  127<H>  4.6   |  27  |  1.47<H>    Ca    9.9      08 Jan 2024 05:30  Mg     1.9     01-08        Urinalysis Basic - ( 08 Jan 2024 05:30 )    Color: x / Appearance: x / SG: x / pH: x  Gluc: 127 mg/dL / Ketone: x  / Bili: x / Urobili: x   Blood: x / Protein: x / Nitrite: x   Leuk Esterase: x / RBC: x / WBC x   Sq Epi: x / Non Sq Epi: x / Bacteria: x              RADIOLOGY & ADDITIONAL STUDIES:  < from: Xray Chest 1 View AP/PA (01.04.24 @ 12:26) >  FINDINGS:    No acute lung disease and no significant change since the previous study.    Prominent pericardial silhouette    The lungs are clear. No pleural effusions. No pneumothorax.    No acute abnormalities of the soft tissues and osseous structures.    < end of copied text >  < from: TTE Echo Complete w/o Contrast w/ Doppler (01.05.24 @ 09:26) >  CONCLUSIONS:     1. Small, hyperdynamic left ventricle with mid-cavity obliteration.   2. Normal right ventricular size and systolic function.   3. Low-flow, low-gradient severe aortic stenosis with normal ejection   fraction.   4. Moderate tricuspid regurgitation.   5. Severe pulmonary hypertension present, pulmonary artery systolic   pressure is 71 mmHg.   6. No pericardial effusion.    < end of copied text >

## 2024-01-08 NOTE — DISCHARGE NOTE PROVIDER - PROVIDER TOKENS
PROVIDER:[TOKEN:[[id number]],SCHEDULEDAPPT:[01/18/2024],SCHEDULEDAPPTTIME:[03:40 PM]],FREE:[LAST:[Park],FIRST:[Eva Barrett],PHONE:[phone]],FAX:[(   )    -],ADDRESS:[Vascular Cardiologist  [address] [place] [address]],SCHEDULEDAPPT:[01/16/2024],SCHEDULEDAPPTTIME:[10:30 AM]] PROVIDER:[TOKEN:[[id number]],SCHEDULEDAPPT:[01/18/2024],SCHEDULEDAPPTTIME:[03:40 PM]],FREE:[LAST:[Park],FIRST:[Eva Barrett],PHONE:[phone]],FAX:[(   )    -],ADDRESS:[Vascular Cardiologist  [address] [place] [address]],SCHEDULEDAPPT:[01/16/2024],SCHEDULEDAPPTTIME:[10:30 AM]]

## 2024-01-08 NOTE — CONSULT NOTE ADULT - ASSESSMENT
78F daily ETOH drinker, HTN, HLD, AFib (on Xarelto), chronic venous stasis, DMT2 w peripheral neuropathy, Hx of uterine CA who was admitted to [facility] 11/21-11/30/23 for ADHFpEF with intracavitary gradient found on TTE with elevated filling pressures on L/RHC on 11/27 and hypoxia sent in by her cardiologist for SOB with minimal exertion. Admitted to cardiac telemetry for HFpEF exacerbation. Lasix IV discontinued - Cr uptrending and likely dry. TTE revealing paradoxical low-flow low-gradient severe AS and normal LVEF. Structural heart team consulted for tte finding of severe AS.    Plan:  Problem 1: Paradoxical low flow, low gradient severe AS  - Hyperdynamic LV   - Cardiac cath 11/2023:  Peak-peak aortic gradient @ rest was 5 mmHg. Mitral valve gradient 3 mmHg  - Patient can follow up with Dr. Woods as an outpatient: please call [phone]  - Patient does not need CT scan at this time    Problem 2: LVOT obstruction   - Continue beta blocker: Lopressor 100mg q12hr   - Outpatient follow up for medical management     Problem 3; HFpEF  - care per primary team   GDMT: Farxiga 10mg PO QD, Spironolactone 25mg PO QD    Problem 4: afib  - care per primary team  - rate controlled   - on xarelto     Problem 5: ETOH abuse  -  care per primary team     I have reviewed clinical labs tests and reports, radiology tests and reports, as well as old patient medical records, and discussed with the referring physician.     78F daily ETOH drinker, HTN, HLD, AFib (on Xarelto), chronic venous stasis, DMT2 w peripheral neuropathy, Hx of uterine CA who was admitted to [facility] 11/21-11/30/23 for ADHFpEF with intracavitary gradient found on TTE with elevated filling pressures on L/RHC on 11/27 and hypoxia sent in by her cardiologist for SOB with minimal exertion. Admitted to cardiac telemetry for HFpEF exacerbation. Lasix IV discontinued - Cr uptrending and likely dry. TTE revealing paradoxical low-flow low-gradient severe AS and normal LVEF. Structural heart team consulted for tte finding of severe AS.    Plan:  Problem 1: Paradoxical low flow, low gradient severe AS  - Hyperdynamic LV   - Cardiac cath 11/2023:  Peak-peak aortic gradient @ rest was 5 mmHg. Mitral valve gradient 3 mmHg  - Patient can follow up with Dr. Woods as an outpatient: please call [phone]  - Patient does not need CT scan at this time    Problem 2: LVOT obstruction   - Continue beta blocker: Lopressor 100mg q12hr   - Outpatient follow up for medical management     Problem 3; HFpEF  - care per primary team   GDMT: Farxiga 10mg PO QD, Spironolactone 25mg PO QD    Problem 4: afib  - care per primary team  - rate controlled   - on xarelto     Problem 5: ETOH abuse  -  care per primary team     I have reviewed clinical labs tests and reports, radiology tests and reports, as well as old patient medical records, and discussed with the referring physician.

## 2024-01-08 NOTE — PHYSICAL THERAPY INITIAL EVALUATION ADULT - ADDITIONAL COMMENTS
Patient lives alone in apartment, +0 YEYO. Patient completes all ADLs and functional mobility independently; she uses a rollator for community ambulation and is "starting to" utilize the rollator more within the apartment. Patient typically ambulates as far a 1 block with rollator; patient will utilize cab services for further travel. Patient is currently receiving [facility] services from [facility].

## 2024-01-08 NOTE — PHYSICAL THERAPY INITIAL EVALUATION ADULT - PERTINENT HX OF CURRENT PROBLEM, REHAB EVAL
Patient is 78F daily ETOH drinker, HTN, HLD, AFib (on Xarelto), chronic venous stasis, DMT2 w peripheral neuropathy, Hx of uterine CA who was admitted to [facility] 11/21-11/30/23 for ADHFpEF and hypoxia. Today, patient presents to [facility] sent in by her cardiologist, because of worsening shortness of breath on minimal exertion. Patient reports that she was off Lasix for a week per her doctor. She reports she is barely able to walk around her apartment without getting very short of breath. Patient is 78F daily ETOH drinker, HTN, HLD, AFib (on Xarelto), chronic venous stasis, DMT2 w peripheral neuropathy, Hx of uterine CA who was admitted to [facility] 11/21-11/30/23 for ADHFpEF and hypoxia. Today, patient presents to [facility] sent in by her cardiologist, because of worsening shortness of breath on minimal exertion. Patient reports that she was off Lasix for a week per her doctor. She reports she is barely able to walk around her apartment without getting very short of breath.

## 2024-01-08 NOTE — PHYSICAL THERAPY INITIAL EVALUATION ADULT - IMPAIRMENTS CONTRIBUTING TO GAIT DEVIATIONS, PT EVAL
decreased endurance; slightly unsteady with no episodes of LOB, slight kyphotic posture,/impaired balance/decreased strength

## 2024-01-08 NOTE — DISCHARGE NOTE PROVIDER - CARE PROVIDER_API CALL
Ammy Woods  Interventional Cardiology  [address], [address]  [place] [address]  Phone: [phone]  Fax: [phone]  Scheduled Appointment: 01/18/2024 03:40 PM    Eva Schneider  Vascular Cardiologist  [address] [address]  Phone: [phone]  Fax: (   )    -  Scheduled Appointment: 01/16/2024 10:30 AM   Ammy Woods  Interventional Cardiology  [address], [address]  [place] [address]  Phone: [phone]  Fax: [phone]  Scheduled Appointment: 01/18/2024 03:40 PM    Eva Schneider  Vascular Cardiologist  [address] [address]  Phone: [phone]  Fax: (   )    -  Scheduled Appointment: 01/16/2024 10:30 AM

## 2024-01-08 NOTE — DIETITIAN INITIAL EVALUATION ADULT - PERTINENT MEDS FT
MEDICATIONS  (STANDING):  atorvastatin 40 milliGRAM(s) Oral at bedtime  cyanocobalamin 1000 MICROGram(s) Oral daily  dapagliflozin 10 milliGRAM(s) Oral every 24 hours  dextrose 5%. 1000 milliLiter(s) (50 mL/Hr) IV Continuous <Continuous>  dextrose 5%. 1000 milliLiter(s) (100 mL/Hr) IV Continuous <Continuous>  dextrose 50% Injectable 25 Gram(s) IV Push once  dextrose 50% Injectable 12.5 Gram(s) IV Push once  dextrose 50% Injectable 25 Gram(s) IV Push once  furosemide    Tablet 40 milliGRAM(s) Oral daily  glucagon  Injectable 1 milliGRAM(s) IntraMuscular once  insulin lispro (ADMELOG) corrective regimen sliding scale   SubCutaneous Before meals and at bedtime  metoprolol tartrate 100 milliGRAM(s) Oral every 12 hours  nystatin Powder 1 Application(s) Topical two times a day  pantoprazole    Tablet 40 milliGRAM(s) Oral before breakfast  rivaroxaban 20 milliGRAM(s) Oral with dinner  spironolactone 25 milliGRAM(s) Oral daily    MEDICATIONS  (PRN):  dextrose Oral Gel 15 Gram(s) Oral once PRN Blood Glucose LESS THAN 70 milliGRAM(s)/deciliter  LORazepam   Injectable 1 milliGRAM(s) IV Push every 1 hour PRN CIWA-Ar score 8 or greater  zolpidem 5 milliGRAM(s) Oral at bedtime PRN Insomnia

## 2024-01-08 NOTE — DISCHARGE NOTE PROVIDER - NSDCCPCAREPLAN_GEN_ALL_CORE_FT
PRINCIPAL DISCHARGE DIAGNOSIS  Diagnosis: Acute heart failure with preserved ejection fraction (HFpEF)  Assessment and Plan of Treatment:       SECONDARY DISCHARGE DIAGNOSES  Diagnosis: Aortic stenosis, severe  Assessment and Plan of Treatment:      PRINCIPAL DISCHARGE DIAGNOSIS  Diagnosis: Acute on chronic heart failure with preserved ejection fraction (HFpEF)  Assessment and Plan of Treatment: You were admitted to the cardiac telemetry floor with congestive heart failure. Heart failure is a condition in which the heart does not pump or fill with blood well. As a result, the heart lags behind in its job of moving blood throughout the body, which can cause fluid backup into your lungs and rest of body. This can lead to symptoms such as swelling, trouble breathing, and feeling tired. You were given intravenous Lasix (furosemide) to get rid of the fluid in your lungs and body to help you breathe better. Please take diuretic medications Furosemide and Spironolactone to prevent reaccumulation of fluid in your body.   -Please maintain a low salt diet and fluid restriction of 1.5 - 2 liters per day to prevent from fluid being reaccumulated. Weigh yourself daily and report any weight gain over 2 pounds/day or 5 pounds per week to your cardiologist.      SECONDARY DISCHARGE DIAGNOSES  Diagnosis: Aortic stenosis, severe  Assessment and Plan of Treatment: You had an echocardiogram that revealed low flow low gradient severe aortic stenosis. Aortic stenosis occurs when the aortic heart valve does not open fully, and makes the valve opening very narrow. You can have symptoms of shortness of breath, dizziness, chest pain. You were evaluated by the Structural Heart Specialist and is recommended to medically manage the aortic stenosis. Follow up with Dr Woods as scheduled on 1/18/24.    Diagnosis: TED (acute kidney injury)  Assessment and Plan of Treatment: While in the hospital you were found to have abnormal kidney function, where your creatinine level was elevated to 1.77. We have monitored this and your kidney function is improving close to normal with creatinine level 1.4 on day of discharge.

## 2024-01-08 NOTE — DISCHARGE NOTE PROVIDER - NSDCMRMEDTOKEN_GEN_ALL_CORE_FT
atorvastatin 40 mg oral tablet: 1 tab(s) orally once a day (at bedtime)  Farxiga 10 mg oral tablet: 1 tab(s) orally every 24 hours  Lunesta 3 mg oral tablet: 1 tab(s) orally once a day (at bedtime)  Metoprolol Tartrate 100 mg oral tablet: 1 tab(s) orally 2 times a day  omeprazole 20 mg oral delayed release capsule: 1 cap(s) orally once a day  Vitamin B-12 1000 mcg oral tablet: 1 tab(s) orally once a day  Vitamin D3 2000 intl units oral tablet: 1 tab(s) orally once a day  Xarelto 20 mg oral tablet: 1 tab(s) orally once a day (in the evening)   atorvastatin 40 mg oral tablet: 1 tab(s) orally once a day (at bedtime)  Farxiga 10 mg oral tablet: 1 tab(s) orally every 24 hours  furosemide 40 mg oral tablet: 1 tab(s) orally once a day  Lunesta 3 mg oral tablet: 1 tab(s) orally once a day (at bedtime)  Metoprolol Tartrate 100 mg oral tablet: 1 tab(s) orally 2 times a day  omeprazole 20 mg oral delayed release capsule: 1 cap(s) orally once a day  spironolactone 25 mg oral tablet: 1 tab(s) orally once a day  Vitamin B-12 1000 mcg oral tablet: 1 tab(s) orally once a day  Vitamin D3 2000 intl units oral tablet: 1 tab(s) orally once a day  Xarelto 20 mg oral tablet: 1 tab(s) orally once a day (in the evening)

## 2024-01-08 NOTE — DIETITIAN INITIAL EVALUATION ADULT - PROBLEM SELECTOR PLAN 1
SOB with minimal exertion acutely worsening over past 2 days.   - SpO2 93% on RA; pt visibly short of breath while walking.  now on 2L NC   - HS Trop T 42, trend second troponin   - BNP 5822.   - RVP negative   - PLAN:   c/w Lasix 40mg IV BID, repeat TTE to evaluate VHD as below (as per last admission patient will not need intervention by Structural)   GDMT: Toprol 100 mg BID

## 2024-01-08 NOTE — DISCHARGE NOTE NURSING/CASE MANAGEMENT/SOCIAL WORK - NSDCPEFALRISK_GEN_ALL_CORE
For information on Fall & Injury Prevention, visit: [email or web address] OR  [email or web address] OR  https://www.cdc.gov/steadi/patient.html For information on Fall & Injury Prevention, visit: [email or web address] OR  [email or web address] OR  https://www.cdc.gov/steadi/patient.html

## 2024-01-08 NOTE — DISCHARGE NOTE PROVIDER - HOSPITAL COURSE
79 y/o F, daily ETOH drinker, HTN, HLD, AFib (on Xarelto), chronic venous stasis, DMT2 w peripheral neuropathy, Hx of uterine CA who was admitted to [facility] 11/21-11/30/23 for ADHFpEF with intracavitary gradient found on TTE with elevated filling pressures on L/RHC on 11/27 and hypoxia sent in by her cardiologist for SOB with minimal exertion. Admitted to cardiac telemetry for HFpEF exacerbation. Lasix IV discontinued - Cr uptrending and likely dry. TTE revealing paradoxical low-flow low-gradient severe AS and normal LVEF - will plan cardiac structural CT for more accurate eval of aortic stenosis severity. 77 y/o F, daily ETOH drinker, HTN, HLD, AFib (on Xarelto), chronic venous stasis, DMT2 w peripheral neuropathy, Hx of uterine CA who was admitted to [facility] 11/21-11/30/23 for ADHFpEF with intracavitary gradient found on TTE with elevated filling pressures on L/RHC on 11/27 and hypoxia sent in by her cardiologist for SOB with minimal exertion. Admitted to cardiac telemetry for HFpEF exacerbation. Lasix IV discontinued - Cr uptrending and likely dry. TTE revealing paradoxical low-flow low-gradient severe AS and normal LVEF - will plan cardiac structural CT for more accurate eval of aortic stenosis severity. 77 y/o F, daily ETOH drinker, HTN, HLD, AFib (on Xarelto), chronic venous stasis, DMT2 w/ peripheral neuropathy, Hx of uterine CA, recent [facility] hospitalization 11/21-11/30/23 for ADHFpEF with intracavitary gradient found on TTE with elevated filling pressures on L/RHC on 11/27 and hypoxia, sent in by cardiologist for SOB with minimal exertion. Admitted to cardiac telemetry for acute HFpEF exacerbation. Lasix IV discontinued - Cr uptrending and likely dry. TTE revealing paradoxical low-flow low-gradient severe AS and normal LVEF - will plan cardiac structural CT for more accurate eval of aortic stenosis severity.     - SOB w /minimal exertion x2days; BNP 5822.   - DIURETIC: Lasix 40mg PO QD (s/p IV lasix)  - GDMT: Farxiga 10mg PO QD, Spironolactone 25mg PO QD.   - I/O: net neg 2L   - TTE (1/5/24): LVEF > 75%, LV mid cavitary obliteration (gradient 20mmhg), low-flow low-gradient severe AS, moderate TR, PASP 71mmHg.   - PLAN: discontinue IV diuresis. TTE as below.     Problem/Plan - 2:  ·  Problem: Valvular heart disease.   ·  Plan: - TTE (11/22/23): LVEF > 75% w/ cavitary obliteration w/ intra-cavitary gradient (66mmHg w/ valsalva), mild LVH, dilated RV, MARISOL, mod AS/TR, mild MS/MR, pulmHTN w/ PASP 116mmHg.    - R/LHC 11/27/23 w/ non-obstructive CAD, elevated filling pressures, no severe AS or LVOT.    - PLAN: repeat TTE as above; plan for structural heart CT scan to more accurately characterize AS.     Problem/Plan - 3:  ·  Problem: ETOH abuse.   ·  Plan: - Reports 1-2 pints of vodka daily, last drink 1-4-23.    - Continue CIWA protocol.     Problem/Plan - 4:  ·  Problem: TED (acute kidney injury).   ·  Plan: - Cr 1.3 -> 1.57 -> 1.77 -> 1.44.   - Now improving off diuresis; likely in setting of diuretic.   - Continue to monitor; will receive contrast load w/ CT scan - monitor renal function.   79 y/o F, daily ETOH drinker, HTN, HLD, AFib (on Xarelto), chronic venous stasis, DMT2 w/ peripheral neuropathy, Hx of uterine CA, recent [facility] hospitalization 11/21-11/30/23 for ADHFpEF with intracavitary gradient found on TTE with elevated filling pressures on L/RHC on 11/27 and hypoxia, sent in by cardiologist for SOB with minimal exertion. Admitted to cardiac telemetry for acute HFpEF exacerbation. Lasix IV discontinued - Cr uptrending and likely dry. TTE revealing paradoxical low-flow low-gradient severe AS and normal LVEF - will plan cardiac structural CT for more accurate eval of aortic stenosis severity.     - SOB w /minimal exertion x2days; BNP 5822.   - DIURETIC: Lasix 40mg PO QD (s/p IV lasix)  - GDMT: Farxiga 10mg PO QD, Spironolactone 25mg PO QD.   - I/O: net neg 2L   - TTE (1/5/24): LVEF > 75%, LV mid cavitary obliteration (gradient 20mmhg), low-flow low-gradient severe AS, moderate TR, PASP 71mmHg.   - PLAN: discontinue IV diuresis. TTE as below.     Problem/Plan - 2:  ·  Problem: Valvular heart disease.   ·  Plan: - TTE (11/22/23): LVEF > 75% w/ cavitary obliteration w/ intra-cavitary gradient (66mmHg w/ valsalva), mild LVH, dilated RV, MARISOL, mod AS/TR, mild MS/MR, pulmHTN w/ PASP 116mmHg.    - R/LHC 11/27/23 w/ non-obstructive CAD, elevated filling pressures, no severe AS or LVOT.    - PLAN: repeat TTE as above; plan for structural heart CT scan to more accurately characterize AS.     Problem/Plan - 3:  ·  Problem: ETOH abuse.   ·  Plan: - Reports 1-2 pints of vodka daily, last drink 1-4-23.    - Continue CIWA protocol.     Problem/Plan - 4:  ·  Problem: TED (acute kidney injury).   ·  Plan: - Cr 1.3 -> 1.57 -> 1.77 -> 1.44.   - Now improving off diuresis; likely in setting of diuretic.   - Continue to monitor; will receive contrast load w/ CT scan - monitor renal function.   77 y/o F, daily ETOH drinker (1-2 pints of vodka daily, last drink 1-4-23), HTN, HLD, paroxysmal AFib (on Xarelto), chronic venous stasis, DMT2 w/ peripheral neuropathy, Hx of uterine CA, recent [facility] hospitalization 11/21-11/30/23 for ADHFpEF with intracavitary gradient found on TTE with elevated filling pressures on L/RHC on 11/27 and hypoxia, sent in by cardiologist for SOB with minimal exertion x 2 days. BNP 5822.    Admitted to cardiac telemetry for acute on chronic HFpEF exacerbation. Pt was diuresed w/ IV Lasix, subsequently developed TED w/ crea 1.77 likely 2/2 overdiuresis. Pt transitioned to Lasix 40mg PO QD and c/w HF GDMT Farxiga 10mg PO QD, and added Spironolactone 25mg PO QD. TTE (1/5/24): LVEF > 75%, LV mid cavitary obliteration (gradient 20mmhg), low-flow low-gradient severe AS, moderate TR, PASP 71mmHg. Structural Heart consulted for AS, rec medical management in setting of LVOT. Recent R/LHC 11/27/23 w/ non-obstructive CAD, elevated filling pressures, no severe AS or LVOT.        Problem/Plan - 4:  ·  Problem: TED (acute kidney injury).   ·  Plan: - Cr 1.3 -> 1.57 -> 1.77 -> 1.44.     79 y/o F, daily ETOH drinker (1-2 pints of vodka daily, last drink 1-4-23), HTN, HLD, paroxysmal AFib (on Xarelto), chronic venous stasis, DMT2 w/ peripheral neuropathy, Hx of uterine CA, recent [facility] hospitalization 11/21-11/30/23 for ADHFpEF with intracavitary gradient found on TTE with elevated filling pressures on L/RHC on 11/27 and hypoxia, sent in by cardiologist for SOB with minimal exertion x 2 days. BNP 5822.    Admitted to cardiac telemetry for acute on chronic HFpEF exacerbation. Pt was diuresed w/ IV Lasix, subsequently developed TED w/ crea 1.77 likely 2/2 overdiuresis. Pt transitioned to Lasix 40mg PO QD and c/w HF GDMT Farxiga 10mg PO QD, and added Spironolactone 25mg PO QD. TTE (1/5/24): LVEF > 75%, LV mid cavitary obliteration (gradient 20mmhg), low-flow low-gradient severe AS, moderate TR, PASP 71mmHg. Structural Heart consulted for AS, rec medical management in setting of LVOT. Recent R/LHC 11/27/23 w/ non-obstructive CAD, elevated filling pressures, no severe AS or LVOT.        Problem/Plan - 4:  ·  Problem: TED (acute kidney injury).   ·  Plan: - Cr 1.3 -> 1.57 -> 1.77 -> 1.44.     77 y/o F, daily ETOH drinker (1-2 pints of vodka daily, last drink 1-4-23), HTN, HLD, paroxysmal AFib (on Xarelto), chronic venous stasis, DMT2 w/ peripheral neuropathy, Hx of uterine CA, recent [facility] hospitalization 11/21-11/30/23 for ADHFpEF with intracavitary gradient found on TTE with elevated filling pressures on L/RHC on 11/27 and hypoxia, sent in by cardiologist for SOB with minimal exertion x 2 days. BNP 5822.    Admitted to cardiac telemetry for acute on chronic HFpEF exacerbation. Pt was diuresed w/ IV Lasix, subsequently developed TED w/ crea 1.77 likely 2/2 overdiuresis. Crea improved to 1.47 on day of discharge. Pt transitioned to Lasix 40mg PO QD and c/w HF GDMT Farxiga 10mg PO QD, and added Spironolactone 25mg PO QD. TTE (1/5/24): LVEF > 75%, LV mid cavitary obliteration (gradient 20mmhg), low-flow low-gradient severe AS, moderate TR, PASP 71mmHg. Structural Heart consulted for low flow low gradient severe AS, rec medical management in setting of hyperdynamic LV. Recent R/LHC 11/27/23 w/ non-obstructive CAD, elevated filling pressures, no severe AS or LVOT. Peak-peak aortic gradient @ rest was 5 mmHg, Mitral valve gradient 3 mmHg.     Pt ambulated in hallway without symptoms. PT evaluation rec home PT. On the day of discharge, the patient was seen and examined. Symptoms improved. Vital signs are stable. Labs and imaging reviewed. Patient is medically optimized and hemodynamically stable. Return precautions discussed, medication teach back done, and importance of physician followup emphasized. The patient verbalized understanding. 77 y/o F, daily ETOH drinker (1-2 pints of vodka daily, last drink 1-4-23), HTN, HLD, paroxysmal AFib (on Xarelto), chronic venous stasis, DMT2 w/ peripheral neuropathy, Hx of uterine CA, recent [facility] hospitalization 11/21-11/30/23 for ADHFpEF with intracavitary gradient found on TTE with elevated filling pressures on L/RHC on 11/27 and hypoxia, sent in by cardiologist for SOB with minimal exertion x 2 days. BNP 5822.    Admitted to cardiac telemetry for acute on chronic HFpEF exacerbation. Pt was diuresed w/ IV Lasix, subsequently developed TED w/ crea 1.77 likely 2/2 overdiuresis. Crea improved to 1.47 on day of discharge. Pt transitioned to Lasix 40mg PO QD and c/w HF GDMT Farxiga 10mg PO QD, and added Spironolactone 25mg PO QD. TTE (1/5/24): LVEF > 75%, LV mid cavitary obliteration (gradient 20mmhg), low-flow low-gradient severe AS, moderate TR, PASP 71mmHg. Structural Heart consulted for low flow low gradient severe AS, rec medical management in setting of hyperdynamic LV. Recent R/LHC 11/27/23 w/ non-obstructive CAD, elevated filling pressures, no severe AS or LVOT. Peak-peak aortic gradient @ rest was 5 mmHg, Mitral valve gradient 3 mmHg.     Pt ambulated in hallway without symptoms. PT evaluation rec home PT. On the day of discharge, the patient was seen and examined. Symptoms improved. Vital signs are stable. Labs and imaging reviewed. Patient is medically optimized and hemodynamically stable. Return precautions discussed, medication teach back done, and importance of physician followup emphasized. The patient verbalized understanding.

## 2024-01-08 NOTE — DIETITIAN INITIAL EVALUATION ADULT - ADD RECOMMEND
1. Monitor PO intake/appetite, GI distress, diet tolerance, labs, weights.  2. Honor pt food preferences as able.  3. Please have team fill out [employer] appl[facility]  4. Micro-nutrients/Vitamins/Minerals per team; MVI daily, consider need for thaimine and folate given ETOH use.  5. RD to remain available for additional nutrition interventions as needed.   ** Moderate Nutrition Risk.  1. Monitor PO intake/appetite, GI distress, diet tolerance, labs, weights.  2. Honor pt food preferences as able.  3. Please have team fill out [employer] applcation [facility]  4. Micro-nutrients/Vitamins/Minerals per team; MVI daily, consider need for thaimine and folate given ETOH use.  5. RD to remain available for additional nutrition interventions as needed.   ** Moderate Nutrition Risk.

## 2024-01-08 NOTE — DIETITIAN INITIAL EVALUATION ADULT - PROBLEM SELECTOR PLAN 7
Bilateral venous stasis ulcers, will need to be seen by wound care      F: None  E: Replete if K<4 or Mag<2  N: DASH Diet  GIppx: PANTOPRAZOLE   VTEppx: Xarelto   Dispo: Pending clinical progression   PT: Pending

## 2024-01-08 NOTE — DISCHARGE NOTE PROVIDER - ATTENDING DISCHARGE PHYSICAL EXAMINATION:
77 yo lady PMHx of prior tobacco use disorder, non-obstructive CAD ([facility] Nov/2023) paroxysmal Afib on rivaroxaban, HFpEF, DM2 c/b diabetic neuropathy, and LE lymphedema who is admitted for acute on chronic decompensated HFpEF exacerbation.    Recent admission @ [facility] Nov/2023  At that time, TTE w/ hyperdynamic LV w/ intra-cavitary gradient 66 mmHg w/ valsalva and elevated PASP  However, in the cath lab, no significant LVOT gradient was noted. Peak-peak aortic gradient @ rest was 5 mmHg. Mitral valve gradient 3 mmHg.  RHC did reveal RA 8, RV 65/11, PA 75/25, PCWP 20, and Loretta CO/CI 3.7/2.2 c/w HFpEF    Assessment  1. Acute on chronic decompensated HFpEF exacerbation  Euvolemic on exam now  Requiring O2, likely has undiagnosed COPD from heavy tobacco use previously  2. Paroxysmal Afib - in NSR  3. LE edema with skin changes 2/2 chronic lymphedema  4. Non-obstructive CAD  5. Paradoxical low flow low gradient severe AS w/ normal EF  6. Moderate TR    Plan  1. PO lasix 40mg QD.  2. HFpEF: Aldactone and farxiga  3. Metoprolol tartrate 100mg BID for paroxysmal Afib currently in NSR  4. Xarelto at current dose for systemic embolization prevention     77 yo lady PMHx of prior tobacco use disorder, non-obstructive CAD ([facility] Nov/2023) paroxysmal Afib on rivaroxaban, HFpEF, DM2 c/b diabetic neuropathy, and LE lymphedema who is admitted for acute on chronic decompensated HFpEF exacerbation.    Recent admission @ [facility] Nov/2023  At that time, TTE w/ hyperdynamic LV w/ intra-cavitary gradient 66 mmHg w/ valsalva and elevated PASP  However, in the cath lab, no significant LVOT gradient was noted. Peak-peak aortic gradient @ rest was 5 mmHg. Mitral valve gradient 3 mmHg.  RHC did reveal RA 8, RV 65/11, PA 75/25, PCWP 20, and Loretta CO/CI 3.7/2.2 c/w HFpEF    Assessment  1. Acute on chronic decompensated HFpEF exacerbation  Euvolemic on exam now  Requiring O2, likely has undiagnosed COPD from heavy tobacco use previously  2. Paroxysmal Afib - in NSR  3. LE edema with skin changes 2/2 chronic lymphedema  4. Non-obstructive CAD  5. Paradoxical low flow low gradient severe AS w/ normal EF  6. Moderate TR    Plan  1. PO lasix 40mg QD.  2. HFpEF: Aldactone and farxiga  3. Metoprolol tartrate 100mg BID for paroxysmal Afib currently in NSR  4. Xarelto at current dose for systemic embolization prevention

## 2024-01-08 NOTE — DIETITIAN INITIAL EVALUATION ADULT - PERTINENT LABORATORY DATA
01-08    137  |  102  |  35<H>  ----------------------------<  127<H>  4.6   |  27  |  1.47<H>    Ca    9.9      08 Jan 2024 05:30  Mg     1.9     01-08    POCT Blood Glucose.: 120 mg/dL (01-08-24 @ 11:42)  A1C with Estimated Average Glucose Result: 6.2 % (01-05-24 @ 05:30)  A1C with Estimated Average Glucose Result: 5.7 % (11-22-23 @ 05:30)

## 2024-01-08 NOTE — PHYSICAL THERAPY INITIAL EVALUATION ADULT - IMPAIRED TRANSFERS: SIT/STAND, REHAB EVAL
decreased endurance, slightly unsteady with no episodes of LOB/impaired balance/impaired postural control/decreased strength

## 2024-01-08 NOTE — DIETITIAN INITIAL EVALUATION ADULT - OTHER CALCULATIONS
5'0''  pounds +-10%; %[id number]  IBW used to calculate energy needs due to pt's current body weight exceeding 120% of IBW  Adjust for age, CHF/renal - fluids per team  5'0''  pounds +-10%; %[id number]  IBW used to calculate energy needs due to pt's current body weight exceeding 120% of IBW  Adjust for age, CHF/renal - fluids per team

## 2024-01-08 NOTE — DIETITIAN INITIAL EVALUATION ADULT - OTHER INFO
78F daily ETOH drinker, HTN, HLD, AFib (on Xarelto), chronic venous stasis, DMT2/peripheral neuropathy, uterine CA who was admitted to [facility] 11/21-11/30/23 for ADHFpEF with intracavitary gradient found on TTE with elevated filling pressures on L/RHC 11/27 and hypoxia sent in by her cardiologist for SOB with minimal exertion. Admitted to cardiac telemetry for ADHFpEF. Lasix IV discontinued - Cr uptrending and likely dry. TTE revealing paradoxical low-flow low-gradient severe AS and normal LVEF. Structural heart team consulted for tte finding of severe AS. Pending w/u, stress ECHO 1/8.     Pt seen this AM on 5UR. RN at bedside. NPO at present. Pt Expresses PTA often ordering meals and use of fresh direct. Often gets frozen dinners from [employer], expresses not cooking PTA - however RD unsure as to why not. D/w [employer] and provided application packet. Also provided Low sodium diet education. Pt stated some understanding however was mostly hyper-focused on wanting to be d/c. POCT 106 224 130, HDL 46, a1c 6.2%. BM+ 1/8. MAGOX Protonix ordered. ETOH use noted; noted use of 1-2 vodka pint/day. CIWA 0. No pain. Donaldo 18. 2+ BL leg edema noted.   Please see below for nutritions recommendations. Recommendations made with team.  78F daily ETOH drinker, HTN, HLD, AFib (on Xarelto), chronic venous stasis, DMT2/peripheral neuropathy, uterine CA who was admitted to [facility] 11/21-11/30/23 for ADHFpEF with intracavitary gradient found on TTE with elevated filling pressures on L/RHC 11/27 and hypoxia sent in by her cardiologist for SOB with minimal exertion. Admitted to cardiac telemetry for ADHFpEF. Lasix IV discontinued - Cr uptrending and likely dry. TTE revealing paradoxical low-flow low-gradient severe AS and normal LVEF. Structural heart team consulted for tte finding of severe AS. Pending w/u, stress ECHO 1/8.     Pt seen this AM on 5UR. RN at bedside. NPO at present. Pt Expresses PTA often ordering meals and use of fresh direct. Often gets frozen dinners from [employer], expresses not cooking PTA - however RD unsure as to why not. D/w [employer] and provided application packet. Also provided Low sodium diet education. Pt stated some understanding however was mostly hyper-focused on wanting to be d/c. POCT 106 224 130, HDL 46, a1c 6.2%. BM+ 1/8. MAGOX Protonix ordered. ETOH use noted; noted use of 1-2 vodka pint/day. CIWA 0. No pain. Donaldo 18. 2+ BL leg edema noted.   Please see below for nutritions recommendations. Recommendations made with team.

## 2024-01-08 NOTE — DISCHARGE NOTE NURSING/CASE MANAGEMENT/SOCIAL WORK - PATIENT PORTAL LINK FT
You can access the FollowMyHealth Patient Portal offered by [facility] by registering at the following website: [email or web address]. By joining [employer]’s FollowMyHealth portal, you will also be able to view your health information using other applications (apps) compatible with our system. You can access the FollowMyHealth Patient Portal offered by [facility] by registering at the following website: [email or web address]. By joining [facility]’s FollowMyHealth portal, you will also be able to view your health information using other applications (apps) compatible with our system.

## 2024-01-08 NOTE — PHYSICAL THERAPY INITIAL EVALUATION ADULT - GAIT DISTANCE, PT EVAL
~150ft ; patient notes that she did not feel any SOB with ambulation ; to note, patient O2 sats improved with ambulated, around ~94%

## 2024-01-08 NOTE — DISCHARGE NOTE PROVIDER - NSDCFUSCHEDAPPT_GEN_ALL_CORE_FT
Eva Schneider  [facility] Physician [facility]  HEARTVASC [address]  Scheduled Appointment: 01/16/2024    Ammy Woods  [facility]  HEARTVASC [address]  Scheduled Appointment: 01/18/2024     Eva cShneider  [facility] Physician [facility]  HEARTVASC [address]  Scheduled Appointment: 01/16/2024    Ammy Woods  [facility]  HEARTVASC [address]  Scheduled Appointment: 01/18/2024

## 2024-01-08 NOTE — PHYSICAL THERAPY INITIAL EVALUATION ADULT - GENERAL OBSERVATIONS, REHAB EVAL
Patient encountered out of bed sitting in bedside chair in no apparent distress, +tele, +heplock, +pulseOx.

## 2024-01-09 ENCOUNTER — TRANSCRIPTION ENCOUNTER (OUTPATIENT)
Age: 79
End: 2024-01-09

## 2024-01-11 ENCOUNTER — TRANSCRIPTION ENCOUNTER (OUTPATIENT)
Age: 79
End: 2024-01-11

## 2024-01-12 DIAGNOSIS — I87.2 VENOUS INSUFFICIENCY (CHRONIC) (PERIPHERAL): ICD-10-CM

## 2024-01-12 DIAGNOSIS — J44.9 CHRONIC OBSTRUCTIVE PULMONARY DISEASE, UNSPECIFIED: ICD-10-CM

## 2024-01-12 DIAGNOSIS — I11.0 HYPERTENSIVE HEART DISEASE WITH HEART FAILURE: ICD-10-CM

## 2024-01-12 DIAGNOSIS — I25.10 ATHEROSCLEROTIC HEART DISEASE OF NATIVE CORONARY ARTERY WITHOUT ANGINA PECTORIS: ICD-10-CM

## 2024-01-12 DIAGNOSIS — F10.10 ALCOHOL ABUSE, UNCOMPLICATED: ICD-10-CM

## 2024-01-12 DIAGNOSIS — Z11.52 ENCOUNTER FOR SCREENING FOR COVID-19: ICD-10-CM

## 2024-01-12 DIAGNOSIS — I08.2 RHEUMATIC DISORDERS OF BOTH AORTIC AND TRICUSPID VALVES: ICD-10-CM

## 2024-01-12 DIAGNOSIS — E11.42 TYPE 2 DIABETES MELLITUS WITH DIABETIC POLYNEUROPATHY: ICD-10-CM

## 2024-01-12 DIAGNOSIS — N17.9 ACUTE KIDNEY FAILURE, UNSPECIFIED: ICD-10-CM

## 2024-01-12 DIAGNOSIS — I50.33 ACUTE ON CHRONIC DIASTOLIC (CONGESTIVE) HEART FAILURE: ICD-10-CM

## 2024-01-12 DIAGNOSIS — Z79.01 LONG TERM (CURRENT) USE OF ANTICOAGULANTS: ICD-10-CM

## 2024-01-12 DIAGNOSIS — I48.0 PAROXYSMAL ATRIAL FIBRILLATION: ICD-10-CM

## 2024-01-12 DIAGNOSIS — Z85.42 PERSONAL HISTORY OF MALIGNANT NEOPLASM OF OTHER PARTS OF UTERUS: ICD-10-CM

## 2024-01-12 DIAGNOSIS — Z90.49 ACQUIRED ABSENCE OF OTHER SPECIFIED PARTS OF DIGESTIVE TRACT: ICD-10-CM

## 2024-01-12 DIAGNOSIS — Z90.710 ACQUIRED ABSENCE OF BOTH CERVIX AND UTERUS: ICD-10-CM

## 2024-01-12 DIAGNOSIS — Z87.891 PERSONAL HISTORY OF NICOTINE DEPENDENCE: ICD-10-CM

## 2024-01-12 DIAGNOSIS — Z79.899 OTHER LONG TERM (CURRENT) DRUG THERAPY: ICD-10-CM

## 2024-01-12 DIAGNOSIS — E78.5 HYPERLIPIDEMIA, UNSPECIFIED: ICD-10-CM

## 2024-01-12 DIAGNOSIS — I35.0 NONRHEUMATIC AORTIC (VALVE) STENOSIS: ICD-10-CM

## 2024-01-15 DIAGNOSIS — I73.9 PERIPHERAL VASCULAR DISEASE, UNSPECIFIED: ICD-10-CM

## 2024-01-16 ENCOUNTER — APPOINTMENT (OUTPATIENT)
Dept: HEART AND VASCULAR | Facility: CLINIC | Age: 79
End: 2024-01-16

## 2024-02-01 ENCOUNTER — APPOINTMENT (OUTPATIENT)
Dept: HEART AND VASCULAR | Facility: CLINIC | Age: 79
End: 2024-02-01
Payer: MEDICARE

## 2024-02-01 VITALS
DIASTOLIC BLOOD PRESSURE: 79 MMHG | HEART RATE: 88 BPM | BODY MASS INDEX: 33.38 KG/M2 | HEIGHT: 60 IN | OXYGEN SATURATION: 100 % | WEIGHT: 170 LBS | SYSTOLIC BLOOD PRESSURE: 131 MMHG

## 2024-02-01 PROCEDURE — 99214 OFFICE O/P EST MOD 30 MIN: CPT

## 2024-02-01 NOTE — REVIEW OF SYSTEMS
[Dyspnea on exertion] : dyspnea during exertion [Fever] : no fever [Chills] : no chills [Sore Throat] : no sore throat [SOB] : no shortness of breath [Chest Discomfort] : no chest discomfort [Lower Ext Edema] : no extremity edema [Leg Claudication] : intermittent leg claudication [Palpitations] : no palpitations [Orthopnea] : no orthopnea [PND] : no PND [Cough] : no cough [Abdominal Pain] : no abdominal pain

## 2024-02-01 NOTE — PHYSICAL EXAM
[Well Developed] : well developed [Normal S1, S2] : normal S1, S2 [Clear Lung Fields] : clear lung fields [Soft] : abdomen soft [Moves all extremities] : moves all extremities [No Edema] : no edema [de-identified] : systolic aortic murmur [No Murmur] : no murmur

## 2024-02-01 NOTE — CARDIOLOGY SUMMARY
[de-identified] : TTE 1/5/24: EF 75%, low flow low gradient severe AS (PV 3, PG 38, MG 20, Ao V 0.86cm2), intracavitary gradient of 20mmHg Moderate TR, SPAP 71mmHg  TTE 11/23 noted LV intracavitary gradient (66 mm hg) due to hyperdynamic systolic function with moderate AS, PASP ~116. Repeated echo while on beta blockers: PASP 55 mm Hg, moderate AS, intracavitary gradient not well seen. RV dilated with normal function [de-identified] : L/RHC 11/27/23: Non-obstructive CAD, no severe AS on valve study, no provocable LVOT obstruction w PVC. Elevated fill pressures, PCWP. RA 8, RV 65/11, PA 75/25 (MPAP 44), PCWP 20 V waves up to 30. [de-identified] : PFTs: Moderate restrictive defect (kyphosis?), mildly reduced DLCO

## 2024-02-01 NOTE — HISTORY OF PRESENT ILLNESS
[FreeTextEntry1] : 78F daily ETOH drinker, HTN, HLD, AFib (on Xarelto), chronic venous stasis, DMT2 w peripheral neuropathy, Hx of uterine CA , paradoxical low flow low gradient  AS??, with recurrent admissions to [facility] because of heart failure (in november 2023 and january 2024), referred for evaluation of valvular disease.  Patient currently reports some dyspnea on exertion and limited ambulation because of legs pain (Claudication?) No chest pain, no palpitations

## 2024-02-01 NOTE — ASSESSMENT
[FreeTextEntry1] : IMPRESSION and PLAN:  # Paradoxical low flow, low gradient AS - Not very significant - Cardiac cath 11/2023:  Peak-peak aortic gradient @ rest was 5 mmHg. Mitral valve gradient 3 mmHg - Symptoms were more related to intracavitary gradient and diastolic heart failure rather than because of valve problem  # Intracavitary gradient/LVOT obstruction  - Improved after initiation of BB - Continue beta blocker: Lopressor 100mg q12hr  - Will repeat TTE since patient has been on BB for more than a month, and will reassess gradient  # HFpEF - Recurrent heart failure admissions in november 2023 and january 2024 - continue GDMT: Farxiga 10mg PO QD, Spironolactone 25mg PO QD, BB - Lasix prn - Consider referral for heart faillure  # afib - rate controlled  - on xarelto   # ETOH abuse -  care per primary team

## 2024-02-06 ENCOUNTER — APPOINTMENT (OUTPATIENT)
Dept: HEART AND VASCULAR | Facility: CLINIC | Age: 79
End: 2024-02-06
Payer: MEDICARE

## 2024-02-06 VITALS
TEMPERATURE: 97.8 F | OXYGEN SATURATION: 100 % | BODY MASS INDEX: 33.38 KG/M2 | HEART RATE: 74 BPM | HEIGHT: 60 IN | WEIGHT: 170 LBS | SYSTOLIC BLOOD PRESSURE: 115 MMHG | DIASTOLIC BLOOD PRESSURE: 63 MMHG

## 2024-02-06 PROCEDURE — ZZZZZ: CPT

## 2024-02-06 PROCEDURE — 93306 TTE W/DOPPLER COMPLETE: CPT

## 2024-02-06 PROCEDURE — 99204 OFFICE O/P NEW MOD 45 MIN: CPT

## 2024-02-06 PROCEDURE — 99214 OFFICE O/P EST MOD 30 MIN: CPT

## 2024-02-06 PROCEDURE — G2211 COMPLEX E/M VISIT ADD ON: CPT

## 2024-02-06 PROCEDURE — 93923 UPR/LXTR ART STDY 3+ LVLS: CPT

## 2024-02-07 ENCOUNTER — EMERGENCY (EMERGENCY)
Facility: HOSPITAL | Age: 79
LOS: 1 days | Discharge: HOME | End: 2024-02-07
Attending: EMERGENCY MEDICINE | Admitting: EMERGENCY MEDICINE
Payer: MEDICARE

## 2024-02-07 VITALS
OXYGEN SATURATION: 99 % | SYSTOLIC BLOOD PRESSURE: 111 MMHG | DIASTOLIC BLOOD PRESSURE: 59 MMHG | RESPIRATION RATE: 18 BRPM | TEMPERATURE: 98 F | HEART RATE: 65 BPM | WEIGHT: 164.91 LBS | HEIGHT: 60 IN

## 2024-02-07 VITALS
DIASTOLIC BLOOD PRESSURE: 56 MMHG | RESPIRATION RATE: 17 BRPM | OXYGEN SATURATION: 98 % | TEMPERATURE: 97 F | HEART RATE: 54 BPM | SYSTOLIC BLOOD PRESSURE: 104 MMHG

## 2024-02-07 DIAGNOSIS — Z98.890 OTHER SPECIFIED POSTPROCEDURAL STATES: Chronic | ICD-10-CM

## 2024-02-07 DIAGNOSIS — Z90.710 ACQUIRED ABSENCE OF BOTH CERVIX AND UTERUS: Chronic | ICD-10-CM

## 2024-02-07 DIAGNOSIS — Z90.49 ACQUIRED ABSENCE OF OTHER SPECIFIED PARTS OF DIGESTIVE TRACT: Chronic | ICD-10-CM

## 2024-02-07 PROCEDURE — 99284 EMERGENCY DEPT VISIT MOD MDM: CPT

## 2024-02-07 PROCEDURE — 99282 EMERGENCY DEPT VISIT SF MDM: CPT

## 2024-02-07 NOTE — ED ADULT TRIAGE NOTE - CHIEF COMPLAINT QUOTE
80 yo F presents to ED with fall x 2. Pt states today was bending down to get some bottles and leg went out. Pt refused to come to the hospital but then fell again and decided to come. Pt denies any complaints at this time. Does reports ome urinary frequency. Denies hitting head, LOC, pain.

## 2024-02-07 NOTE — ED CLERICAL - NS ED CLERK NOTE PRE-ARRIVAL INFORMATION; ADDITIONAL PRE-ARRIVAL INFORMATION
This patient is enrolled in the Heart Failure STARS readmission reduction initiative and has active care navigation. This patient can be followed up by the care navigation team within 24 hours. To arrange close follow-up or to obtain additional clinical information, please call the care navigation contact number above..  For in house Cardiology patients, please call the Cardiology consult fellow at [phone] for ALL PATIENTS with a cardiac issue PRIOR to disposition if you are considering admission.
No

## 2024-02-07 NOTE — ED PROVIDER NOTE - CLINICAL SUMMARY MEDICAL DECISION MAKING FREE TEXT BOX
Patient: Gaby Starr  [id number]  Date: 1/31/2022      Chief Complaint: Falls    History of Present Illness/Hospital Course:  15-year-old male with history of CAD and recurrent falls who was admitted on 1/29 for a reported syncopal episode. CT head was unremarkable. He was noted to have a mild DARIAN and has been receiving IV fluids. Per report he has been evaluated at another hospital recently for concerns of a TIA. He states he has been receiving assistance from homecare agents and his daughters. He was evaluated by therapy and suggested continuing an inpatient setting prior to returning home. He states he does not want to go to a facility and wants to return home with the assistance of his agency support and daughters. Prior Level of Function:  Required assistance    Current Level of Function:  Wisam HWANG     has a past medical history of CAD (coronary artery disease). has a past surgical history that includes Coronary artery bypass graft and Cardiac surgery. reports that he has never smoked. He has never used smokeless tobacco. He reports that he does not drink alcohol and does not use drugs. family history is not contributory    REVIEW OF SYSTEMS:   CONSTITUTIONAL: negative for fevers, chills, diaphoresis, appetite change, fatigue, night sweats and unexpected weight change. HEENT: negative for hearing loss, tinnitus, ear drainage, sinus pressure, nasal congestion, epistaxis and snoring. RESPIRATORY: Negative for hemoptysis, cough, sputum production. CARDIOVASCULAR: negative for chest pain, palpitations, exertional chest pressure/discomfort, edema, syncope. GASTROINTESTINAL: negative for nausea, vomiting, diarrhea, constipation, blood in stool and abdominal pain. GENITOURINARY: negative for frequency, dysuria, urinary incontinence, decreased urine volume, and hematuria. HEMATOLOGIC/LYMPHATIC: negative for easy bruising, bleeding and lymphadenopathy.    ALLERGIC/IMMUNOLOGIC: negative for recurrent infections, angioedema, anaphylaxis and drug reactions. ENDOCRINE: negative for weight changes and diabetic symptoms including polyuria, polydipsia and polyphagia. MUSCULOSKELETAL: negative for pain, joint swelling, decreased range of motion and muscle weakness. NEUROLOGICAL: negative for headaches, slurred speech, unilateral weakness. PSYCHIATRIC/BEHAVIORAL: negative for hallucinations, behavioral problems, confusion and agitation. All pertinent positives are noted in the HPI. Physical Examination:  Vitals: Patient Vitals for the past 24 hrs:   BP Temp Temp src Pulse Resp SpO2 Weight   01/31/22 0806 (!) 173/71 97.5 °F (36.4 °C) Oral 54 16 97 % --   01/31/22 0353 (!) 184/76 97.6 °F (36.4 °C) Oral 57 16 95 % 200 lb (90.7 kg)   01/30/22 2348 (!) 166/79 97.4 °F (36.3 °C) Oral 57 20 97 % --   01/30/22 1930 (!) 143/64 97.3 °F (36.3 °C) Oral 53 20 95 % --   01/30/22 1838 -- -- -- 54 -- -- --   01/30/22 1602 -- -- -- 58 -- -- --   01/30/22 1544 (!) 158/71 97.1 °F (36.2 °C) Oral 51 18 95 % --   01/30/22 1411 -- -- -- 55 -- -- --   01/30/22 1243 129/65 -- -- -- -- -- --   01/30/22 1205 -- -- -- 53 -- -- --   01/30/22 1129 (!) 143/68 97.4 °F (36.3 °C) Oral 56 18 95 % --   01/30/22 1013 -- -- -- 55 -- -- --     Psych: Stable mood, normal judgement, normal affect. Const: No distress  Eyes: Conjunctiva noninjected, no icterus noted; pupils equal, round. HENT: Atraumatic, normocephalic; Oral mucosa moist  Neck: Trachea midline, neck supple. No thyromegaly noted. CV: No audible murmurs  Resp: No increased WOB, no audible wheezing   GI: Nondistended   Neuro: Alert, oriented, appropriate. Dysarthria vs aphasia (reports this is chronic). No focal weakness  Skin: No visible abnormalities  MSK: No joint abnormalities noted. Ext: No significant edema appreciated. No varicosities.     Lab Results   Component Value Date    WBC 4.7 01/29/2022    HGB 11.1 (L) 01/29/2022    HCT 33.6 (L) 01/29/2022    MCV 95.8 01/29/2022     01/29/2022     No results found for: INR, PROTIME  Lab Results   Component Value Date    CREATININE 1.5 (H) 01/29/2022    BUN 24 (H) 01/29/2022     (L) 01/29/2022    K 4.3 01/29/2022     01/29/2022    CO2 25 01/29/2022     Lab Results   Component Value Date    ALT 8 (L) 01/29/2022    AST 13 (L) 01/29/2022    ALKPHOS 97 01/29/2022    BILITOT 0.6 01/29/2022       Most recent imaging studies revealed   EXAMINATION:   CT OF THE HEAD WITHOUT CONTRAST  1/29/2022 2:00 pm       TECHNIQUE:   CT of the head was performed without the administration of intravenous   contrast. Dose modulation, iterative reconstruction, and/or weight based   adjustment of the mA/kV was utilized to reduce the radiation dose to as low   as reasonably achievable.       COMPARISON:   None.       HISTORY:   ORDERING SYSTEM PROVIDED HISTORY: fall - syncope vs seizure   TECHNOLOGIST PROVIDED HISTORY:   Has a \"code stroke\" or \"stroke alert\" been called? ->No   Reason for exam:->fall - syncope vs seizure   Decision Support Exception - unselect if not a suspected or confirmed   emergency medical condition->Emergency Medical Condition (MA)       FINDINGS:   BRAIN/VENTRICLES: There is no acute intracranial hemorrhage, mass effect or   midline shift.  No abnormal extra-axial fluid collection.  The gray-white   differentiation is maintained without evidence of an acute infarct.  There is   no evidence of hydrocephalus.  Atherosclerotic calcifications were noted in   each vertebral artery.  Bilateral carotid artery calcifications were noted as   well.  A small old focal lacunar type infarct was identified in the right   thalamus.  Moderate cerebral cortical atrophy was noted.  Moderate confluent   areas of old small vessel infarction, ischemia or gliosis from prior ischemic   events were noted in the bilateral corona radiata, bilateral periventricular   white matter, bilateral forceps minor       ORBITS: The visualized portion of the orbits demonstrate no acute abnormality.       SINUSES: .  Only minimal mucosal thickening involves the maxillary and   sphenoid sinuses.       SOFT TISSUES/SKULL:  No acute abnormality of the visualized skull or soft   tissues.           Impression   No acute intracranial abnormality. The above laboratory data have been reviewed. The above imaging data have been reviewed. The above medical testing have been reviewed. Body mass index is 27.12 kg/m². Assessment and Plan:  Debility: PT/OT  Syncope  HTN  DARIAN  CAD    Dispo: Patient is functionally appropriate for ARU however we would not be able to accept this patient due to his 40% diagnosis and our limited bed capacity. Patient is also not interested in an ARU or SNF stay and wants to discharge to home with the support of his home care services and his daughters. He would require 24 hour support at his current functional level. We will sign off. Thank you for the consultation. Fernando Joiner MD 1/31/2022, 9:00 AM     * This document was created using dictation software. While all precautions were taken to ensure accuracy, errors may have occurred. Please disregard any typographical errors. Patient states she has chronic weakness and numbness of lower extremities secondary to peripheral neuropathy.  Patient states her legs gave out due to her peripheral neuropathy/numbness and fell.  Patient states she does not think she hit her head.  Denies headache vision changes focal weakness numbness or any other acute complaints. Has not been using her walker/rollator at home. Feels that x2 PT is not enough at home, has been using her home PT.    Sx likely related to chronic neuropathy, pt and niece say ongoing for many years. Advised that she should use rollator/walker at home. CM saw pt, pt and niece to call company for daily PT and agreeable w plan. Pt provided w walker in ED on discharge and ambulating w walker.

## 2024-02-07 NOTE — ED PROVIDER NOTE - OBJECTIVE STATEMENT
77 y/o F, daily ETOH drinker (1-2 pints of vodka daily, last drink 1-4-23), HTN, HLD, paroxysmal AFib (on Xarelto), chronic venous stasis, DMT2 w/ peripheral neuropathy, Hx of uterine CA, recent [facility] hospitalization 11/21-11/30/23 for ADHFpEF with intracavitary gradient found on TTE with elevated filling pressures on L/RHC on 11/27 Here with fall x 2.  Patient states she has chronic weakness and numbness of lower extremities secondary to peripheral neuropathy.  Patient states her legs gave out due to her peripheral neuropathy/numbness and fell.  Patient states she does not think she hit her head.  Denies headache vision changes focal weakness numbness or any other acute complaints. 79 y/o F, daily ETOH drinker (1-2 pints of vodka daily, last drink 1-4-23), HTN, HLD, paroxysmal AFib (on Xarelto), chronic venous stasis, DMT2 w/ peripheral neuropathy, Hx of uterine CA, recent [facility] hospitalization 11/21-11/30/23 for ADHFpEF with intracavitary gradient found on TTE with elevated filling pressures on L/RHC on 11/27 Here with fall x 2. Patient states she has chronic weakness and numbness of lower extremities secondary to peripheral neuropathy.  Patient states her legs gave out due to her peripheral neuropathy/numbness and fell.  Patient states she does not think she hit her head.  Denies headache vision changes focal weakness numbness or any other acute complaints. Has not been using her walker/rollator at home. Feels that x2 PT is not enough at home, has been using her home PT.

## 2024-02-07 NOTE — PHYSICAL EXAM
[General Appearance - Well Developed] : well developed [Normal Appearance] : normal appearance [Well Groomed] : well groomed [General Appearance - Well Nourished] : well nourished [No Deformities] : no deformities [General Appearance - In No Acute Distress] : no acute distress [Normal Conjunctiva] : the conjunctiva exhibited no abnormalities [Eyelids - No Xanthelasma] : the eyelids demonstrated no xanthelasmas [Normal Oral Mucosa] : normal oral mucosa [No Oral Pallor] : no oral pallor [No Oral Cyanosis] : no oral cyanosis [Normal Jugular Venous A Waves Present] : normal jugular venous A waves present [Normal Jugular Venous V Waves Present] : normal jugular venous V waves present [No Jugular Venous Castrejon A Waves] : no jugular venous castrejon A waves [Heart Rate And Rhythm] : heart rate and rhythm were normal [Heart Sounds] : normal S1 and S2 [Murmurs] : no murmurs present [Respiration, Rhythm And Depth] : normal respiratory rhythm and effort [Exaggerated Use Of Accessory Muscles For Inspiration] : no accessory muscle use [Auscultation Breath Sounds / Voice Sounds] : lungs were clear to auscultation bilaterally [Abdomen Soft] : soft [Abdomen Tenderness] : non-tender [Abdomen Mass (___ Cm)] : no abdominal mass palpated [Abnormal Walk] : normal gait [Gait - Sufficient For Exercise Testing] : the gait was sufficient for exercise testing [Nail Clubbing] : no clubbing of the fingernails [Cyanosis, Localized] : no localized cyanosis [Petechial Hemorrhages (___cm)] : no petechial hemorrhages [Skin Color & Pigmentation] : normal skin color and pigmentation [] : no rash [No Venous Stasis] : no venous stasis [Skin Lesions] : no skin lesions [No Skin Ulcers] : no skin ulcer [No Xanthoma] : no  xanthoma was observed [Oriented To Time, Place, And Person] : oriented to person, place, and time [Affect] : the affect was normal [Mood] : the mood was normal [No Anxiety] : not feeling anxious [FreeTextEntry1] : B/l PT and DP 2+; no LE edema; chronic venous stasis changes and lymphedema with skin hyperpigmentation and thickening

## 2024-02-07 NOTE — ED PROVIDER NOTE - PATIENT PORTAL LINK FT
You can access the FollowMyHealth Patient Portal offered by [facility] by registering at the following website: [email or web address]. By joining [employer]’s FollowMyHealth portal, you will also be able to view your health information using other applications (apps) compatible with our system.

## 2024-02-07 NOTE — ED ADULT NURSE NOTE - OBJECTIVE STATEMENT
Pt is a 78y/o F presenting to the ED w/ c/o of 2x fall today @ home, refused to go to hospital 1st time, then fell again "my legs just gave out." -LOC, -headstrike, +thinners, no obvious injury noted. Pt endorses PMHx HF, shoulder issues, HLD, daily alcohol use, DM type 2. Pt currently endorses GREGORY, numbness to BLE, intermittent nausea. Pt denies CP, SOB @ rest, vomiting, diarrhea, fever/chills, HA, blurry vision, recent falls @ home, pain. Pt presents w/ reddened/swollen/dry/scaly skin to BLE, numbness upon palpation, diminished pedal pulses present. Pt A/Ox3, speaking in clear/complete sentences. Rhonchi noted to bilat lung fields upon expiration. Pt ambulates w/ walker @ home. Niece @ home.

## 2024-02-07 NOTE — DISCUSSION/SUMMARY
[FreeTextEntry1] : 78 yo lady PMHx of prior tobacco use disorder, non-obstructive CAD ([facility] Nov/2023), paroxysmal Afib on rivaroxaban, HFpEF, DM2 c/b diabetic neuropathy, elevated LVOT gradient (improved with BB), paradoxical low flow low gradient severe AS not c/w true severe AS, chronic venous disease, and LE lymphedema who presents to [facility] cardiology clinic as new patient.  Assessment 1. Chronic LE skin changes, edema, and hyperpigmentation Exam c/w mix of both chronic venous change and LE lymphedema No concern for decompensated HFpEF at this moment KASI 02/06 1.2 right and 1.08 left so no concern for PAD  Plan 1. High risk for future skin and soft tissue infection given late stage chronic venous disease and LE lymphedema 2. Given extensive education on skin hygiene, leg elevation, exercise, and leg compression 3. Given referral to lymphedema physical therapy Dr. Elena Cali 4. RTC in 3 mo  During non face-to-face time, I reviewed relevant portions of the patient's medical record. During face-to-face time, I took a relevant history and examined the patient. I also explained differential diagnoses, relevant cardiac diagnoses, workup, and management plan, which required a moderate level of medical decision making. I answered all questions related to the patient's medical conditions.   Eva Schneider M.D. Attending Cardiologist [facility]

## 2024-02-07 NOTE — ED ADULT NURSE NOTE - NSFALLHARMRISKINTERV_ED_ALL_ED
Symptoms
Assistance OOB with selected safe patient handling equipment if applicable/Communicate risk of Fall with Harm to all staff, patient, and family/Monitor gait and stability/Provide patient with walking aids/Provide visual cue: red socks, yellow wristband, yellow gown, etc/Reinforce activity limits and safety measures with patient and family/Bed in lowest position, wheels locked, appropriate side rails in place/Call bell, personal items and telephone in reach/Instruct patient to call for assistance before getting out of bed/chair/stretcher/Non-slip footwear applied when patient is off stretcher/[place] to call system/Physically safe environment - no spills, clutter or unnecessary equipment/Purposeful Proactive Rounding/Room/bathroom lighting operational, light cord in reach

## 2024-02-07 NOTE — ED PROVIDER NOTE - CARE PROVIDERS DIRECT ADDRESSES
[email or web address]

## 2024-02-07 NOTE — ED PROVIDER NOTE - PHYSICAL EXAMINATION
CONSTITUTIONAL: Awake, alert and in no apparent distress.  HEENT: Head is atraumatic. Eyes clear bilaterally, normal EOMI. Airway patent.  CARDIAC: Normal rate, regular rhythm.  Heart sounds S1, S2.   RESPIRATORY: Breath sounds clear and equal bilaterally. no tachypnea, respiratory distress.   GASTROINTESTINAL: Abdomen soft, non-tender, no guarding, distension.  MUSCULOSKELETAL: Spine appears normal, no midline spinal tenderness, range of motion is not limited, no muscle or joint tenderness. no bony tenderness.   NEUROLOGICAL: Alert, no focal deficits, no motor or sensory deficits.  SKIN: Skin normal color for race, warm, dry and intact. No evidence of rash.  PSYCHIATRIC: Normal mood and affect. no apparent risk to self or others. CONSTITUTIONAL: Awake, alert and in no apparent distress.  HEENT: Head is atraumatic. Eyes clear bilaterally, normal EOMI. Airway patent.  CARDIAC: Normal rate, regular rhythm.  Heart sounds S1, S2.   RESPIRATORY: Breath sounds clear and equal bilaterally. no tachypnea, respiratory distress.   GASTROINTESTINAL: Abdomen soft, non-tender, no guarding, distension.  MUSCULOSKELETAL: Spine appears normal, no midline spinal tenderness, range of motion is not limited, no muscle or joint tenderness. no bony tenderness. 5/5 strength in lower ext, chronic venous stasis, reddish discoloration to lower ext and feet, DP+2 b/l  NEUROLOGICAL: Alert, no focal deficits, no motor or sensory deficits.

## 2024-02-07 NOTE — HISTORY OF PRESENT ILLNESS
[FreeTextEntry1] : Cardiologist: Luis Barbour MD Structural cardiology: Ammy Woods MD  78 yo lady PMHx of prior tobacco use disorder, non-obstructive CAD ([facility] Nov/2023), paroxysmal Afib on rivaroxaban, HFpEF, DM2 c/b diabetic neuropathy, elevated LVOT gradient (improved with BB), paradoxical low flow low gradient severe AS not c/w true severe AS, chronic venous disease, and LE lymphedema who presents to [facility] cardiology clinic as new patient.  ROS + LE redness and scaly/dry skin  PMHx/PSHx as above FMHx none contributory Social hx: lives alone, independent, walks with walker, no substance use currently (prior tobacco use)

## 2024-02-07 NOTE — ED ADULT NURSE NOTE - CHIEF COMPLAINT QUOTE
78 yo F presents to ED with fall x 2. Pt states today was bending down to get some bottles and leg went out. Pt refused to come to the hospital but then fell again and decided to come. Pt denies any complaints at this time. Does reports ome urinary frequency. Denies hitting head, LOC, pain.

## 2024-02-07 NOTE — ED PROVIDER NOTE - CARE PROVIDER_API CALL
Dominic Garzon  Vascular Surgery  [address], [address] [place] [address]  Phone: [phone]  Fax: [phone]  Follow Up Time:

## 2024-02-08 ENCOUNTER — APPOINTMENT (OUTPATIENT)
Dept: HEART AND VASCULAR | Facility: CLINIC | Age: 79
End: 2024-02-08

## 2024-02-09 DIAGNOSIS — I10 ESSENTIAL (PRIMARY) HYPERTENSION: ICD-10-CM

## 2024-02-09 DIAGNOSIS — Z85.42 PERSONAL HISTORY OF MALIGNANT NEOPLASM OF OTHER PARTS OF UTERUS: ICD-10-CM

## 2024-02-09 DIAGNOSIS — E11.42 TYPE 2 DIABETES MELLITUS WITH DIABETIC POLYNEUROPATHY: ICD-10-CM

## 2024-02-09 DIAGNOSIS — E78.5 HYPERLIPIDEMIA, UNSPECIFIED: ICD-10-CM

## 2024-02-09 DIAGNOSIS — I48.0 PAROXYSMAL ATRIAL FIBRILLATION: ICD-10-CM

## 2024-02-09 DIAGNOSIS — I87.8 OTHER SPECIFIED DISORDERS OF VEINS: ICD-10-CM

## 2024-02-09 DIAGNOSIS — G62.9 POLYNEUROPATHY, UNSPECIFIED: ICD-10-CM

## 2024-02-09 DIAGNOSIS — Z79.01 LONG TERM (CURRENT) USE OF ANTICOAGULANTS: ICD-10-CM

## 2024-02-12 ENCOUNTER — RX RENEWAL (OUTPATIENT)
Age: 79
End: 2024-02-12

## 2024-02-14 LAB — SARS-COV-2 N GENE NPH QL NAA+PROBE: NOT DETECTED

## 2024-02-27 ENCOUNTER — NON-APPOINTMENT (OUTPATIENT)
Age: 79
End: 2024-02-27

## 2024-02-27 ENCOUNTER — APPOINTMENT (OUTPATIENT)
Dept: HEART AND VASCULAR | Facility: CLINIC | Age: 79
End: 2024-02-27
Payer: MEDICARE

## 2024-02-27 VITALS
SYSTOLIC BLOOD PRESSURE: 105 MMHG | HEART RATE: 66 BPM | HEIGHT: 60 IN | OXYGEN SATURATION: 96 % | DIASTOLIC BLOOD PRESSURE: 69 MMHG

## 2024-02-27 PROCEDURE — 99214 OFFICE O/P EST MOD 30 MIN: CPT

## 2024-02-29 ENCOUNTER — APPOINTMENT (OUTPATIENT)
Dept: VASCULAR SURGERY | Facility: CLINIC | Age: 79
End: 2024-02-29
Payer: MEDICARE

## 2024-02-29 VITALS
BODY MASS INDEX: 33.38 KG/M2 | WEIGHT: 170 LBS | HEART RATE: 82 BPM | SYSTOLIC BLOOD PRESSURE: 130 MMHG | HEIGHT: 60 IN | DIASTOLIC BLOOD PRESSURE: 58 MMHG

## 2024-02-29 DIAGNOSIS — G63 POLYNEUROPATHY IN DISEASES CLASSIFIED ELSEWHERE: ICD-10-CM

## 2024-02-29 PROCEDURE — 99204 OFFICE O/P NEW MOD 45 MIN: CPT

## 2024-02-29 RX ORDER — UMECLIDINIUM BROMIDE AND VILANTEROL TRIFENATATE 62.5; 25 UG/1; UG/1
62.5-25 POWDER RESPIRATORY (INHALATION)
Qty: 1 | Refills: 11 | Status: DISCONTINUED | COMMUNITY
Start: 2022-08-29 | End: 2024-02-29

## 2024-02-29 RX ORDER — GABAPENTIN 300 MG/1
300 CAPSULE ORAL TWICE DAILY
Qty: 180 | Refills: 0 | Status: DISCONTINUED | COMMUNITY
Start: 2024-02-06 | End: 2024-02-29

## 2024-02-29 RX ORDER — ESZOPICLONE 3 MG/1
3 TABLET, COATED ORAL
Refills: 0 | Status: DISCONTINUED | COMMUNITY
End: 2024-02-29

## 2024-02-29 RX ORDER — FUROSEMIDE 20 MG/1
20 TABLET ORAL
Qty: 30 | Refills: 3 | Status: DISCONTINUED | COMMUNITY
Start: 2023-12-07 | End: 2024-02-29

## 2024-03-01 PROBLEM — G63 POLYNEUROPATHY ASSOCIATED WITH UNDERLYING DISEASE: Status: ACTIVE | Noted: 2024-03-01

## 2024-03-01 RX ORDER — SPIRONOLACTONE 25 MG/1
25 TABLET ORAL DAILY
Refills: 0 | Status: ACTIVE | COMMUNITY

## 2024-03-01 RX ORDER — ESZOPICLONE 3 MG/1
3 TABLET, FILM COATED ORAL
Refills: 0 | Status: ACTIVE | COMMUNITY

## 2024-03-01 NOTE — ADDENDUM
[FreeTextEntry1] : This note was written by Katerin CALLE, acting as a scribe for Dr. Kahlil White.  I, Dr. Kahlil White, have read and attest that all the information, medical decision-making, and discharge instructions within are true and accurate.  Ms. Nanette Clifford presents for evaluation of BLE numbness. Denies rest pain, tissue loss or wounds. Recent arterial duplex did not show significant disease. No acute vascular surgery intervention. Will refer to endocrinology for further management of her diabetes (she was apparently recommended to go to someone, but she would like another referral).    I, Dr. Kahlil White, personally performed the evaluation and management (E/M) services for this new patient.  That E/M includes conducting the initial examination, assessing all conditions, and establishing the plan of care.  Today, my ACP, Katerin CALLE, was here to observe my evaluation and management services for this patient to be followed going forward.

## 2024-03-01 NOTE — HISTORY OF PRESENT ILLNESS
[FreeTextEntry1] : 79yoF with PMHx of prior tobacco use disorder, non-obstructive CAD ([facility] Nov/2023), paroxysmal Afib on rivaroxaban, HFpEF, T2DM, diabetic neuropathy, chronic venous disease, and LE lymphedema presents for an evaluation of LEs numbness. She states that she experiences numbness, tingling, denies skin breakdown, claudication, rest pain. She ambulates with a walker.  Arterial duplex 1/5/24: No hemodynamically significant stenosis of BL LEs

## 2024-03-01 NOTE — PHYSICAL EXAM
[Respiratory Effort] : normal respiratory effort [Normal Heart Sounds] : normal heart sounds [2+] : right 2+ [1+] : left 1+ [Ankle Swelling (On Exam)] : present [Ankle Swelling Bilaterally] : bilaterally  [Ankle Swelling On The Left] : moderate [] : present [Varicose Veins Of Lower Extremities] : not present [de-identified] : WN/WD, NAD [de-identified] : NC/AT [de-identified] : BL Les with venous stasis dermatitis and dry skin, no skin breakdown

## 2024-03-01 NOTE — ASSESSMENT
[Foot care/Footwear] : foot care/footwear [FreeTextEntry1] : 79yoF with PMHx of prior tobacco use disorder, non-obstructive CAD ([facility] Nov/2023), paroxysmal Afib on rivaroxaban, HFpEF, T2DM, diabetic neuropathy, chronic venous disease, and LE lymphedema presents for an evaluation of LEs numbness. No rest pain, claudication, tissue loss. On exam, BL LEs moderate edema, venous stasis dermatitis and dry skin, no skin breakdown.  We reviewed arterial duplex from 1/5/24 and discussed the findings with the patient. We explained that no vascular intervention is necessary at this time. We suggest moisturizing her skin and to wear compression stockings.  WE also recommended to see an endocrinologist to be evaluated for her diabetic neuropathy, referral to Dr. Blackburn was provided. F/u as needed.

## 2024-03-02 ENCOUNTER — EMERGENCY (EMERGENCY)
Facility: HOSPITAL | Age: 79
LOS: 1 days | Discharge: HOME | End: 2024-03-02
Attending: STUDENT IN AN ORGANIZED HEALTH CARE EDUCATION/TRAINING PROGRAM | Admitting: STUDENT IN AN ORGANIZED HEALTH CARE EDUCATION/TRAINING PROGRAM
Payer: MEDICARE

## 2024-03-02 VITALS
WEIGHT: 158.95 LBS | DIASTOLIC BLOOD PRESSURE: 54 MMHG | SYSTOLIC BLOOD PRESSURE: 90 MMHG | HEART RATE: 99 BPM | TEMPERATURE: 98 F | RESPIRATION RATE: 21 BRPM | OXYGEN SATURATION: 95 % | HEIGHT: 60 IN

## 2024-03-02 DIAGNOSIS — Z90.49 ACQUIRED ABSENCE OF OTHER SPECIFIED PARTS OF DIGESTIVE TRACT: Chronic | ICD-10-CM

## 2024-03-02 DIAGNOSIS — Z98.890 OTHER SPECIFIED POSTPROCEDURAL STATES: Chronic | ICD-10-CM

## 2024-03-02 DIAGNOSIS — Z90.710 ACQUIRED ABSENCE OF BOTH CERVIX AND UTERUS: Chronic | ICD-10-CM

## 2024-03-02 PROCEDURE — 99284 EMERGENCY DEPT VISIT MOD MDM: CPT

## 2024-03-02 PROCEDURE — 99282 EMERGENCY DEPT VISIT SF MDM: CPT

## 2024-03-02 PROCEDURE — 93010 ELECTROCARDIOGRAM REPORT: CPT

## 2024-03-02 PROCEDURE — 93005 ELECTROCARDIOGRAM TRACING: CPT

## 2024-03-02 NOTE — ED PROVIDER NOTE - PHYSICAL EXAMINATION
General: Awake, alert and oriented. No acute distress.   Skin:  Appropriate color for ethnicity  HENMT: head normocephalic and atraumatic  EYES: Conjunctiva clear. nonicteric sclera  Cardiac: well perfused  Respiratory: breathing comfortably on room air  Abdominal: nondistended  MSK:  no visualized external signs of trauma. no apparent deficits in ROM of any extremity  Neurological: The patient is awake, alert and oriented with normal speech. CN 2-12 grossly intact. no apparent deficits. Memory is normal and thought process is intact. moving all extremities spontaneously   Psychiatric: Appropriate mood and affect. Good judgement and insight

## 2024-03-02 NOTE — ED ADULT NURSE NOTE - NSFALLRISKINTERV_ED_ALL_ED

## 2024-03-02 NOTE — ED PROVIDER NOTE - OBJECTIVE STATEMENT
79f daily ETOH drinker (1-2 pints of vodka daily, last drink 1-4-23), HTN, HLD, paroxysmal AFib (on Xarelto), chronic venous stasis, DMT2 w/ peripheral neuropathy, Hx of uterine CA. usually ambulated with a walker. was walking without a walker and lost her balance. falling to the ground. did not injure herself. no hea struck. however was unable to stand up by herself so called ems which brought her to the hospital.

## 2024-03-02 NOTE — ED ADULT TRIAGE NOTE - ARRIVAL INFO ADDITIONAL COMMENTS
Patient with reported fall event at home. Patient reports that she is supposed to use a walker to mobilize - was not utilizing walker at the time. Patient denies syncope or LOC before or after event.  Denies dizziness, shortness of breath or chest pain. Extremities noted to be cool to the touch

## 2024-03-02 NOTE — ED ADULT NURSE NOTE - OBJECTIVE STATEMENT
pt c/o fall. supposed to use walker when ambulating d/t LE peripheral neuropathy/weakness but was not using at time of fall. denies head strike. when asked what body part she landed on, states "I don't know, I just fell." denies pain anywhere. states last alcoholic beverage was last night, a cup of sake. aox4. denies dizziness, lightheadedness

## 2024-03-03 VITALS
HEART RATE: 57 BPM | SYSTOLIC BLOOD PRESSURE: 109 MMHG | TEMPERATURE: 98 F | RESPIRATION RATE: 18 BRPM | DIASTOLIC BLOOD PRESSURE: 61 MMHG | OXYGEN SATURATION: 98 %

## 2024-03-03 DIAGNOSIS — Z04.3 ENCOUNTER FOR EXAMINATION AND OBSERVATION FOLLOWING OTHER ACCIDENT: ICD-10-CM

## 2024-03-03 DIAGNOSIS — I10 ESSENTIAL (PRIMARY) HYPERTENSION: ICD-10-CM

## 2024-03-03 DIAGNOSIS — Z79.01 LONG TERM (CURRENT) USE OF ANTICOAGULANTS: ICD-10-CM

## 2024-03-03 DIAGNOSIS — E78.5 HYPERLIPIDEMIA, UNSPECIFIED: ICD-10-CM

## 2024-03-03 DIAGNOSIS — E11.42 TYPE 2 DIABETES MELLITUS WITH DIABETIC POLYNEUROPATHY: ICD-10-CM

## 2024-03-03 DIAGNOSIS — F10.90 ALCOHOL USE, UNSPECIFIED, UNCOMPLICATED: ICD-10-CM

## 2024-03-03 DIAGNOSIS — Z85.42 PERSONAL HISTORY OF MALIGNANT NEOPLASM OF OTHER PARTS OF UTERUS: ICD-10-CM

## 2024-03-03 DIAGNOSIS — I48.91 UNSPECIFIED ATRIAL FIBRILLATION: ICD-10-CM

## 2024-03-12 ENCOUNTER — APPOINTMENT (OUTPATIENT)
Dept: HEART AND VASCULAR | Facility: CLINIC | Age: 79
End: 2024-03-12

## 2024-04-02 RX ORDER — FUROSEMIDE 20 MG/1
20 TABLET ORAL DAILY
Qty: 7 | Refills: 0 | Status: COMPLETED | COMMUNITY
Start: 2024-04-02 | End: 2024-04-09

## 2024-04-02 NOTE — DISCUSSION/SUMMARY
[FreeTextEntry1] : 79 female with lovt obstruction presenting for f/u  LVOT obstruction: Last echo with lvot gradient of 11. Likely higher given that gradient at AV is recorded at 23 mmHg but more likely due to LVOT gradient interference. Invasive valve study showed only a single digit gradient across AV. Pt needs adherence to medications and reevaluation at that time. Family and friends to help.  Aortic Stenosis: Likely Mild to Mod AS. Will have pt f/u with HF for HFpEF management. If LVOT improves with medications and still with high filling pressures will reevaluate AV --possibly with a MANUEL

## 2024-04-02 NOTE — PHYSICAL EXAM
[Well Developed] : well developed [No Acute Distress] : no acute distress [Well Nourished] : well nourished [Normal Conjunctiva] : normal conjunctiva [Normal Venous Pressure] : normal venous pressure [No Carotid Bruit] : no carotid bruit [Normal S1, S2] : normal S1, S2 [No Murmur] : no murmur [Clear Lung Fields] : clear lung fields [Good Air Entry] : good air entry [Soft] : abdomen soft [No Respiratory Distress] : no respiratory distress  [Non Tender] : non-tender [Normal Bowel Sounds] : normal bowel sounds [Normal Gait] : normal gait [Venous stasis] : venous stasis [Moves all extremities] : moves all extremities [No Focal Deficits] : no focal deficits [Normal Speech] : normal speech [Alert and Oriented] : alert and oriented [Normal memory] : normal memory [de-identified] : varicose veins [de-identified] : lymphedema

## 2024-04-02 NOTE — HISTORY OF PRESENT ILLNESS
[FreeTextEntry1] : 78F daily ETOH drinker, HTN, HLD, AFib (on Xarelto), chronic venous stasis, DMT2 w peripheral neuropathy, Hx of uterine CA , paradoxical low flow low gradient AS??, with recurrent admissions to [facility] because of heart failure (in november 2023 and january 2024), likely due to medication mixups/difficulty maintaining compliance here for f.u. Reports that she still has sob with activity. Denies chest pain, palpitations, diaphoresis or loc. Known nonobstructive cad. Mean gradient across AV 5 mmHg invasively. LVOT gradient initially elevated, improved on bblockers.

## 2024-04-25 ENCOUNTER — APPOINTMENT (OUTPATIENT)
Dept: HEART AND VASCULAR | Facility: CLINIC | Age: 79
End: 2024-04-25

## 2024-05-02 ENCOUNTER — APPOINTMENT (OUTPATIENT)
Dept: HEART AND VASCULAR | Facility: CLINIC | Age: 79
End: 2024-05-02
Payer: MEDICARE

## 2024-05-02 ENCOUNTER — NON-APPOINTMENT (OUTPATIENT)
Age: 79
End: 2024-05-02

## 2024-05-02 VITALS
SYSTOLIC BLOOD PRESSURE: 100 MMHG | BODY MASS INDEX: 33.77 KG/M2 | TEMPERATURE: 98.7 F | DIASTOLIC BLOOD PRESSURE: 80 MMHG | OXYGEN SATURATION: 92 % | WEIGHT: 172 LBS | HEIGHT: 60 IN | HEART RATE: 70 BPM

## 2024-05-02 DIAGNOSIS — I48.91 UNSPECIFIED ATRIAL FIBRILLATION: ICD-10-CM

## 2024-05-02 DIAGNOSIS — I50.30 UNSPECIFIED DIASTOLIC (CONGESTIVE) HEART FAILURE: ICD-10-CM

## 2024-05-02 DIAGNOSIS — E11.40 TYPE 2 DIABETES MELLITUS WITH DIABETIC NEUROPATHY, UNSPECIFIED: ICD-10-CM

## 2024-05-02 DIAGNOSIS — I35.0 NONRHEUMATIC AORTIC (VALVE) STENOSIS: ICD-10-CM

## 2024-05-02 DIAGNOSIS — R06.09 OTHER FORMS OF DYSPNEA: ICD-10-CM

## 2024-05-02 PROCEDURE — 93000 ELECTROCARDIOGRAM COMPLETE: CPT

## 2024-05-02 PROCEDURE — 99215 OFFICE O/P EST HI 40 MIN: CPT

## 2024-05-02 PROCEDURE — G2211 COMPLEX E/M VISIT ADD ON: CPT

## 2024-05-02 RX ORDER — CYANOCOBALAMIN (VITAMIN B-12) 1000 MCG
1000 TABLET ORAL
Refills: 0 | Status: ACTIVE | COMMUNITY

## 2024-05-02 NOTE — PHYSICAL EXAM
[Well Developed] : well developed [Well Nourished] : well nourished [No Acute Distress] : no acute distress [Obese] : obese [Normal Conjunctiva] : normal conjunctiva [Normal Venous Pressure] : normal venous pressure [No Carotid Bruit] : no carotid bruit [Normal S1, S2] : normal S1, S2 [No Rub] : no rub [No Gallop] : no gallop [Murmur] : murmur [No Respiratory Distress] : no respiratory distress  [Soft] : abdomen soft [Non Tender] : non-tender [No Masses/organomegaly] : no masses/organomegaly [Normal Bowel Sounds] : normal bowel sounds [Abnormal Gait] : abnormal gait [No Cyanosis] : no cyanosis [No Clubbing] : no clubbing [No Varicosities] : no varicosities [Edema ___] : edema [unfilled] [Venous stasis] : venous stasis [Moves all extremities] : moves all extremities [No Focal Deficits] : no focal deficits [Normal Speech] : normal speech [Alert and Oriented] : alert and oriented [Frail] : frail [Good Air Entry] : good air entry [Cognitive Impairment] : cognitive impairment [de-identified] : +kyphotic [de-identified] : +DEAN [de-identified] : appears euvolemic [de-identified] : +++ chronic skin changes with some breaks in integrity on bilateral LE [de-identified] : easily confused and forgets

## 2024-05-02 NOTE — HISTORY OF PRESENT ILLNESS
[FreeTextEntry1] : 78F daily ETOH drinker, HTN, HLD, AFib (on Xarelto), chronic venous stasis, DMT2 w peripheral neuropathy, Hx of uterine CA who was admitted to [facility] 11/21-11/30/23 for ADHFpEF and hypoxia. TTE with intracavitary gradient, LVEF >75%, mild LVH, mod AS, PASP 116 mm hg, mod TR, mild MR, mild MS. L/RHC 11/27 showing non obstructive CAD, no severe AS or LVOT but elevated filling pressures. IV diuresis to biochemical dehydration for which pt was given IV hydration with Cr improvement to 1.37 at discharge. Had some ETOH withdrawal sx requiring a dose of Ativan during hospitalization. PFTs w moderate restrictive defect likely from kyphosis, with mildly reduced DLCO. Was weaned off oxygen after diuresis. Pt resumed anoro at home prn and will follow up with Dr. Silvestre.  Pt currently in rate controlled atrial fibrillation and tolerating xarelto 20 mg once daily.  12/7/23 FU: walking balance remains an issue - has been mostly sedentary, but moving around the house is okay - no orthopnea, PND. Leg swelling chronic and possibly less than usual - getting to the office just a little out of breath, not the same as what brought her to the hospital - confirmed taking the following discharge meds: metoprolol tartrate 100 mg BID, atorvastatin 40 mg QD, dapagliflozin 10 mg QD, rivaroxaban 20 mg QD  1/4/24 FU: patient reports being short of breath walking around her home - we tried to have her take lasix for a week without improvement - O2 sats normal in office today. Appears euvolemic, with some wheezing on chest auscultation. + DEAN - friend who came with her also concerned she cannot ambulate at home safely due to significant GREGORY  5/2/24: usual wt at home 170 lbs, been weighing daily and reports stable around this wt - last took lasix yesterday. sometimes misses, does not like it due to polyuria. appears euvolemic on exam. + DEAN - still short of breath with minimal exertion -- recommend to see Dr Portillo for possible Cardiomems

## 2024-05-02 NOTE — REASON FOR VISIT
[FreeTextEntry1] :  CV Data: TTE 11/22 noted LV intracavitary gradient (66 mm hg) due to hyperdynamic systolic function with moderate AS, PASP ~116. Repeated echo 11/24 while on beta blockers: PASP 55 mm Hg, moderate AS, intracavitary gradient not well seen. RV dilated with normal function. TTE 2/6/24: Mid-cavity obliteration w 11 mmHg gradient at rest. Severe diastolic dysfxn, paradoxical LFLG severe AS w normal EF. Mod-sev TR.  TTE 1/5/24: severe PHTN PASP 71, LFLG severe AS, midcavitary obliteration 20 mmHg KASI 2/6/24: Normal indices, R 1.2, L 1.08 L/RHC 11/27/23: no AS on valve study, no provocable LVOT obstruction w PVC. Elevated fill pressures, PCWP. RA 8, RV 65/11, PA 75/25 (MPAP 44), PCWP 20 V waves up to 30. Group 2 PH

## 2024-05-02 NOTE — REVIEW OF SYSTEMS
[Dyspnea on exertion] : dyspnea during exertion [Negative] : Heme/Lymph [SOB] : shortness of breath [Lower Ext Edema] : no extremity edema [Leg Claudication] : no intermittent leg claudication [Palpitations] : no palpitations [Orthopnea] : no orthopnea [PND] : no PND [Syncope] : no syncope [Tingling (Paresthesia)] : tingling [Limb Weakness (Paresis)] : limb weakness (Paresis)

## 2024-05-03 LAB
ALBUMIN SERPL ELPH-MCNC: 4.1 G/DL
ALP BLD-CCNC: 72 U/L
ALT SERPL-CCNC: 10 U/L
ANION GAP SERPL CALC-SCNC: 13 MMOL/L
AST SERPL-CCNC: 14 U/L
BILIRUB SERPL-MCNC: 1 MG/DL
BUN SERPL-MCNC: 21 MG/DL
CALCIUM SERPL-MCNC: 9.7 MG/DL
CHLORIDE SERPL-SCNC: 98 MMOL/L
CO2 SERPL-SCNC: 25 MMOL/L
CREAT SERPL-MCNC: 1.36 MG/DL
EGFR: 39 ML/MIN/1.73M2
GLUCOSE SERPL-MCNC: 130 MG/DL
HCT VFR BLD CALC: 41.3 %
HGB BLD-MCNC: 12.1 G/DL
MCHC RBC-ENTMCNC: 28.3 PG
MCHC RBC-ENTMCNC: 29.3 GM/DL
MCV RBC AUTO: 96.5 FL
NT-PROBNP SERPL-MCNC: 3865 PG/ML
PLATELET # BLD AUTO: 212 K/UL
POTASSIUM SERPL-SCNC: 4.4 MMOL/L
PROT SERPL-MCNC: 6.4 G/DL
RBC # BLD: 4.28 M/UL
RBC # FLD: 20.8 %
SODIUM SERPL-SCNC: 137 MMOL/L
WBC # FLD AUTO: 7.42 K/UL

## 2024-05-06 LAB
ESTIMATED AVERAGE GLUCOSE: 140 MG/DL
HBA1C MFR BLD HPLC: 6.5 %

## 2024-05-21 ENCOUNTER — APPOINTMENT (OUTPATIENT)
Dept: ENDOCRINOLOGY | Facility: CLINIC | Age: 79
End: 2024-05-21

## 2024-05-22 ENCOUNTER — APPOINTMENT (OUTPATIENT)
Age: 79
End: 2024-05-22

## 2024-05-23 ENCOUNTER — APPOINTMENT (OUTPATIENT)
Dept: HEART AND VASCULAR | Facility: CLINIC | Age: 79
End: 2024-05-23

## 2024-05-24 ENCOUNTER — APPOINTMENT (OUTPATIENT)
Dept: HEART AND VASCULAR | Facility: CLINIC | Age: 79
End: 2024-05-24

## 2024-05-27 NOTE — HISTORY OF PRESENT ILLNESS
[FreeTextEntry1] : 79 year old F pt, with Hx of T2DM diagnosed in ___ , referred by Dr. SARIAH CHAPPELL, presents today to establish endocrine care.   Patient with history of neuropathy.  FHx of DM: Last Funduscopic visit:  DM retinopathy: Y/N   Other PMHx: HFpEF, CAD, AFIB, Chronic venous stasis, HTN, HLD, Uterine CA, Obeisty No PMHx of:  FHx: Mother: Father: Sibling:  No family Hx of:  Social Hx: Non smoker. Daily EtOH use. No recreational drug use. Works . Lives with .  Lifestyle:  NKDA  05/21/2024 Patient presents today for   Patient has POCT BS , BP and BMI , with the chief complaint of  She endorses    Current Medications: Farxiga 10 mg, Xarelto 20 mg, Atorvastatin Calcium 40 mg,  Furosemide 40 MG, Metropolol tartrate 100 mg, Sprinolacton 25 mg qd, Omeprazole 20 mg qd

## 2024-05-31 RX ORDER — FUROSEMIDE 40 MG/1
40 TABLET ORAL DAILY
Qty: 90 | Refills: 1 | Status: ACTIVE | COMMUNITY
Start: 1900-01-01 | End: 1900-01-01

## 2024-06-21 ENCOUNTER — RX RENEWAL (OUTPATIENT)
Age: 79
End: 2024-06-21

## 2024-06-21 RX ORDER — RIVAROXABAN 20 MG/1
20 TABLET, FILM COATED ORAL
Qty: 90 | Refills: 2 | Status: ACTIVE | COMMUNITY
Start: 1900-01-01 | End: 1900-01-01

## 2024-06-26 ENCOUNTER — APPOINTMENT (OUTPATIENT)
Age: 79
End: 2024-06-26

## 2024-08-21 ENCOUNTER — APPOINTMENT (OUTPATIENT)
Age: 79
End: 2024-08-21
Payer: MEDICARE

## 2024-08-21 PROCEDURE — 99442: CPT | Mod: 93

## 2024-08-22 ENCOUNTER — APPOINTMENT (OUTPATIENT)
Dept: HEART AND VASCULAR | Facility: CLINIC | Age: 79
End: 2024-08-22
Payer: MEDICARE

## 2024-08-22 DIAGNOSIS — Q24.8 OTHER SPECIFIED CONGENITAL MALFORMATIONS OF HEART: ICD-10-CM

## 2024-08-22 PROCEDURE — 99442: CPT | Mod: 93

## 2024-11-29 NOTE — PROVIDER CONTACT NOTE (CRITICAL VALUE NOTIFICATION) - SITUATION
Patient on heparin gtt in preparation for cardiac catheterization on Monday, patient previously on Xarelto.
Verbal/written post procedure instructions were given to patient/caregiver./Instructed patient/caregiver to follow-up with primary care physician./Instructed patient/caregiver regarding signs and symptoms of infection./Keep the cast/splint/dressing clean and dry.

## 2025-01-08 NOTE — ED ADULT NURSE NOTE - NS ED PATIENT SAFETY CONCERN
Transitional Care Management Telephone Call Attempt    Discharge Date: 1/4/25  Discharge Location: [facility]: [facility]    Call Attempt Date: 1/8/2025  Call Attempt: 3rd attempt, case closed  
No